# Patient Record
Sex: MALE | Race: WHITE | NOT HISPANIC OR LATINO | Employment: UNEMPLOYED | URBAN - METROPOLITAN AREA
[De-identification: names, ages, dates, MRNs, and addresses within clinical notes are randomized per-mention and may not be internally consistent; named-entity substitution may affect disease eponyms.]

---

## 2018-01-05 ENCOUNTER — APPOINTMENT (OUTPATIENT)
Dept: GENERAL RADIOLOGY | Facility: HOSPITAL | Age: 43
End: 2018-01-05

## 2018-01-05 PROCEDURE — 73610 X-RAY EXAM OF ANKLE: CPT | Performed by: GENERAL PRACTICE

## 2018-01-05 PROCEDURE — 73630 X-RAY EXAM OF FOOT: CPT | Performed by: GENERAL PRACTICE

## 2018-09-19 ENCOUNTER — OFFICE VISIT (OUTPATIENT)
Dept: FAMILY MEDICINE CLINIC | Facility: CLINIC | Age: 43
End: 2018-09-19

## 2018-09-19 VITALS
HEART RATE: 63 BPM | HEIGHT: 72 IN | DIASTOLIC BLOOD PRESSURE: 78 MMHG | WEIGHT: 315 LBS | OXYGEN SATURATION: 96 % | RESPIRATION RATE: 16 BRPM | SYSTOLIC BLOOD PRESSURE: 112 MMHG | BODY MASS INDEX: 42.66 KG/M2 | TEMPERATURE: 98.3 F

## 2018-09-19 DIAGNOSIS — I10 ESSENTIAL HYPERTENSION: ICD-10-CM

## 2018-09-19 DIAGNOSIS — E78.2 MIXED HYPERLIPIDEMIA: ICD-10-CM

## 2018-09-19 DIAGNOSIS — E55.9 VITAMIN D DEFICIENCY: ICD-10-CM

## 2018-09-19 DIAGNOSIS — G58.9 MONONEUROPATHY: ICD-10-CM

## 2018-09-19 DIAGNOSIS — E11.9 TYPE 2 DIABETES MELLITUS WITHOUT COMPLICATION, WITHOUT LONG-TERM CURRENT USE OF INSULIN (HCC): Primary | ICD-10-CM

## 2018-09-19 DIAGNOSIS — G47.33 OBSTRUCTIVE SLEEP APNEA: ICD-10-CM

## 2018-09-19 DIAGNOSIS — K21.9 GASTROESOPHAGEAL REFLUX DISEASE WITHOUT ESOPHAGITIS: ICD-10-CM

## 2018-09-19 DIAGNOSIS — Z00.00 LABORATORY EXAMINATION ORDERED AS PART OF A ROUTINE GENERAL MEDICAL EXAMINATION: ICD-10-CM

## 2018-09-19 DIAGNOSIS — F41.9 ANXIETY: ICD-10-CM

## 2018-09-19 PROCEDURE — 90715 TDAP VACCINE 7 YRS/> IM: CPT | Performed by: PHYSICIAN ASSISTANT

## 2018-09-19 PROCEDURE — 90472 IMMUNIZATION ADMIN EACH ADD: CPT | Performed by: PHYSICIAN ASSISTANT

## 2018-09-19 PROCEDURE — 99214 OFFICE O/P EST MOD 30 MIN: CPT | Performed by: PHYSICIAN ASSISTANT

## 2018-09-19 PROCEDURE — 90732 PPSV23 VACC 2 YRS+ SUBQ/IM: CPT | Performed by: PHYSICIAN ASSISTANT

## 2018-09-19 PROCEDURE — 90471 IMMUNIZATION ADMIN: CPT | Performed by: PHYSICIAN ASSISTANT

## 2018-09-19 RX ORDER — ROSUVASTATIN CALCIUM 10 MG/1
TABLET, COATED ORAL
Qty: 30 TABLET | Refills: 11 | Status: SHIPPED | OUTPATIENT
Start: 2018-09-19 | End: 2019-06-19 | Stop reason: SDUPTHER

## 2018-09-19 RX ORDER — OMEPRAZOLE 20 MG/1
20 CAPSULE, DELAYED RELEASE ORAL DAILY
Qty: 30 CAPSULE | Refills: 5 | Status: SHIPPED | OUTPATIENT
Start: 2018-09-19 | End: 2019-03-04 | Stop reason: SDUPTHER

## 2018-09-19 RX ORDER — CLOBETASOL PROPIONATE 0.5 MG/G
1 OINTMENT TOPICAL AS NEEDED
COMMUNITY
End: 2020-12-13 | Stop reason: HOSPADM

## 2018-09-19 RX ORDER — HYDROCHLOROTHIAZIDE 25 MG/1
25 TABLET ORAL DAILY
Refills: 0 | COMMUNITY
Start: 2018-06-16 | End: 2018-09-19 | Stop reason: SDUPTHER

## 2018-09-19 RX ORDER — IRBESARTAN 150 MG/1
150 TABLET ORAL DAILY
Qty: 30 TABLET | Refills: 5 | Status: SHIPPED | OUTPATIENT
Start: 2018-09-19 | End: 2019-02-17

## 2018-09-19 RX ORDER — HYDROCHLOROTHIAZIDE 25 MG/1
25 TABLET ORAL DAILY
Qty: 30 TABLET | Refills: 5 | Status: SHIPPED | OUTPATIENT
Start: 2018-09-19 | End: 2018-09-21 | Stop reason: DRUGHIGH

## 2018-09-19 RX ORDER — CITALOPRAM 10 MG/1
TABLET ORAL
Qty: 45 TABLET | Refills: 5 | Status: SHIPPED | OUTPATIENT
Start: 2018-09-19 | End: 2018-12-07 | Stop reason: SDUPTHER

## 2018-09-19 RX ORDER — IRBESARTAN 150 MG/1
150 TABLET ORAL DAILY
Refills: 1 | COMMUNITY
Start: 2018-08-28 | End: 2018-09-19 | Stop reason: SDUPTHER

## 2018-09-19 NOTE — PATIENT INSTRUCTIONS
Low glycemic index diet  Exercise 30 minutes most days of the week  Make sure you get results on any labs or tests we ordered today  We discussed medications and how to take them as prescribed  Sleep 6-8 hours each night if possible  If you have not signed up for [employer], please activate your code ASAP from your After Visit Summary today    LDL goal <100  LDL goal if heart disease <70  HDL goal >60  Triglyceride goal <150  BP goal =<130/80  Fasting glucose <100

## 2018-09-19 NOTE — PROGRESS NOTES
Subjective   Ray London is a 42 y.o. male.     History of Present Illness   Ray London 42 y.o. male who presents today for a new patient appointment.    he has a history of   Patient Active Problem List   Diagnosis   • Type 2 diabetes mellitus without complication, without long-term current use of insulin (CMS/HCC)   • GERD (gastroesophageal reflux disease)   • Essential hypertension   • Mixed hyperlipidemia   • Anxiety   • Vitamin D deficiency   • Mononeuropathy   .  he is here to establish care I reviewed the PFSH recorded today by my MA/LPN staff.   he   He has been feeling fairly well.    Sees derm  Has plantar fasciitis right     I have Lindsay labs 2-10-17 and gluce 103, creat 0.9, TSH 3.24, chol 194, , I see A1C 2015 at 10.3  A1C 2-17 was 6.9  Vit D low at 21 in 2-17  He was put on Metformin at ER 2 mos ago and did well  Has been intol to ACE    Ray London 42 y.o. male who presents today for routine follow up check and medication refills.  he has a history of   Patient Active Problem List   Diagnosis   • Type 2 diabetes mellitus without complication, without long-term current use of insulin (CMS/HCC)   • GERD (gastroesophageal reflux disease)   • Essential hypertension   • Mixed hyperlipidemia   • Anxiety   • Vitamin D deficiency   • Mononeuropathy   ., he has overall felt well.  He has GERD and is well controlled on PPI medication, Hyperlipidemia and here to discuss treatment, Vitamin D deficiency and will update labs to confirm level is at goal >30 and DMII and will restart Metformin want think about higher dose.  WAs on [employer].  he has been compliant with current medications have reviewed them.  The patient denies medication side effects.  I will start a statin  No results found for this or any previous visit.    Has prior sleep apnea    On Celexa for anxiety; this is working most of the time and will try 1.5 tabs; some episodes depression.  Mom on this  No joint pain  Weight  was 410 mos ago and working on diet and exercise  The following portions of the patient's history were reviewed and updated as appropriate: allergies, current medications, past family history, past medical history, past social history, past surgical history and problem list.    Review of Systems   Constitutional: Negative for activity change, appetite change and unexpected weight change.   HENT: Negative for nosebleeds and trouble swallowing.    Eyes: Negative for pain and visual disturbance.   Respiratory: Negative for chest tightness, shortness of breath and wheezing.    Cardiovascular: Negative for chest pain and palpitations.   Gastrointestinal: Negative for abdominal pain and blood in stool.   Endocrine: Negative.    Genitourinary: Negative for difficulty urinating and hematuria.   Musculoskeletal: Negative for joint swelling.   Skin: Positive for rash. Negative for color change.   Allergic/Immunologic: Negative.    Neurological: Negative for syncope and speech difficulty.   Hematological: Negative for adenopathy.   Psychiatric/Behavioral: Negative for agitation and confusion.   All other systems reviewed and are negative.      Objective   Physical Exam   Constitutional: He is oriented to person, place, and time. He appears well-developed and well-nourished. No distress.   HENT:   Head: Normocephalic and atraumatic.   Eyes: Pupils are equal, round, and reactive to light. Conjunctivae and EOM are normal. Right eye exhibits no discharge. Left eye exhibits no discharge. No scleral icterus.   Neck: Normal range of motion. Neck supple. No tracheal deviation present. No thyromegaly present.   Cardiovascular: Normal rate, regular rhythm, normal heart sounds, intact distal pulses and normal pulses.  Exam reveals no gallop.    No murmur heard.  Trace pedal edema = bilat  Varicose veins left lower ext   Pulmonary/Chest: Effort normal and breath sounds normal. No respiratory distress. He has no wheezes. He has no rales.    Musculoskeletal: Normal range of motion.    Ray had a diabetic foot exam performed today.   During the foot exam he had a monofilament test performed.    Neurological Sensory Findings - Unaltered hot/cold right ankle/foot discrimination and unaltered hot/cold left ankle/foot discrimination. Unaltered sharp/dull right ankle/foot discrimination and unaltered sharp/dull left ankle/foot discrimination. No right ankle/foot altered proprioception and no left ankle/foot altered proprioception  Vascular Status -  His right foot exhibits abnormal foot edema. His right foot exhibits normal foot vasculature . His left foot exhibits abnormal foot edema. His left foot exhibits normal foot vasculature .  Skin Integrity  -  His right foot skin is not intact. He has right foot onychomycosis and callous right foot.  He has no right foot ulcer, no ingrown toenail on right foot, right heel is not dry and cracked, no right foot warmth, no right foot blister and no right foot gangrenous changes.  His left foot skin is not intact.He has left foot onychomycosis and callous left foot. He has no left foot ulcer, no left ingrown toenail, no left heel dry and cracked, no left foot warmth, no left foot blister and no left foot gangrenous changes..     Neurological: He is alert and oriented to person, place, and time. He exhibits normal muscle tone. Coordination normal.   Skin: Skin is warm. Rash noted. No erythema. No pallor.   Psychiatric: He has a normal mood and affect. His behavior is normal. Judgment and thought content normal.   Nursing note and vitals reviewed.      Assessment/Plan   Ray was seen today for establish care and anemia.    Diagnoses and all orders for this visit:    Type 2 diabetes mellitus without complication, without long-term current use of insulin (CMS/[facility])  -     Comprehensive metabolic panel  -     Lipid panel  -     CBC and Differential  -     TSH  -     T3  -     T4, Free  -     Vitamin B12  -     Folate  -      Vitamin D 25 Hydroxy  -     Hemoglobin A1c  -     MicroAlbumin, Urine, Random - Urine, Clean Catch  -     Ambulatory Referral to Nutrition Services  -     Ambulatory Referral to Diabetic Education    Gastroesophageal reflux disease without esophagitis  -     Comprehensive metabolic panel  -     Lipid panel  -     CBC and Differential  -     TSH  -     T3  -     T4, Free  -     Vitamin B12  -     Folate  -     Vitamin D 25 Hydroxy  -     Hemoglobin A1c  -     MicroAlbumin, Urine, Random - Urine, Clean Catch    Essential hypertension  -     Comprehensive metabolic panel  -     Lipid panel  -     CBC and Differential  -     TSH  -     T3  -     T4, Free  -     Vitamin B12  -     Folate  -     Vitamin D 25 Hydroxy  -     Hemoglobin A1c  -     MicroAlbumin, Urine, Random - Urine, Clean Catch    Mixed hyperlipidemia  -     Comprehensive metabolic panel  -     Lipid panel  -     CBC and Differential  -     TSH  -     T3  -     T4, Free  -     Vitamin B12  -     Folate  -     Vitamin D 25 Hydroxy  -     Hemoglobin A1c  -     MicroAlbumin, Urine, Random - Urine, Clean Catch    Anxiety  -     Comprehensive metabolic panel  -     Lipid panel  -     CBC and Differential  -     TSH  -     T3  -     T4, Free  -     Vitamin B12  -     Folate  -     Vitamin D 25 Hydroxy  -     Hemoglobin A1c  -     MicroAlbumin, Urine, Random - Urine, Clean Catch    Vitamin D deficiency  -     Comprehensive metabolic panel  -     Lipid panel  -     CBC and Differential  -     TSH  -     T3  -     T4, Free  -     Vitamin B12  -     Folate  -     Vitamin D 25 Hydroxy  -     Hemoglobin A1c  -     MicroAlbumin, Urine, Random - Urine, Clean Catch    Laboratory examination ordered as part of a routine general medical examination  -     Comprehensive metabolic panel  -     Lipid panel  -     CBC and Differential  -     TSH  -     T3  -     T4, Free  -     Vitamin B12  -     Folate  -     Vitamin D 25 Hydroxy  -     Hemoglobin A1c  -     MicroAlbumin,  Urine, Random - Urine, Clean Catch    Obstructive sleep apnea    Mononeuropathy    Other orders  -     metFORMIN (GLUCOPHAGE) 500 MG tablet; One PO daily with food and MVI for DMII; titrate as high as 2 tabs PO BID  -     citalopram (CeleXA) 10 MG tablet; 1.5 (15mg) tabs PO daily for stress; (put on file)  -     rosuvastatin (CRESTOR) 10 MG tablet; Start 1/2 tab PO daily For cholesterol; try to get to 1 tab PO daily  -     Pneumococcal Polysaccharide Vaccine 23-Valent Greater Than or Equal To 1yo Subcutaneous / IM  -     Tdap Vaccine Greater Than or Equal To 8yo IM  -     omeprazole (priLOSEC) 20 MG capsule; Take 1 capsule by mouth Daily. For GERD  -     irbesartan (AVAPRO) 150 MG tablet; Take 1 tablet by mouth Daily. For BP; (put on file)  -     hydrochlorothiazide (HYDRODIURIL) 25 MG tablet; Take 1 tablet by mouth Daily. For BP;(put on file)

## 2018-09-21 LAB
25(OH)D3+25(OH)D2 SERPL-MCNC: 15.9 NG/ML (ref 30–100)
ALBUMIN SERPL-MCNC: 4.6 G/DL (ref 3.5–5.2)
ALBUMIN/GLOB SERPL: 1.5 G/DL
ALP SERPL-CCNC: 90 U/L (ref 39–117)
ALT SERPL-CCNC: 23 U/L (ref 1–41)
AST SERPL-CCNC: 10 U/L (ref 1–40)
BASOPHILS # BLD AUTO: 0.08 10*3/MM3 (ref 0–0.2)
BASOPHILS NFR BLD AUTO: 0.7 % (ref 0–1.5)
BILIRUB SERPL-MCNC: 0.5 MG/DL (ref 0.1–1.2)
BUN SERPL-MCNC: 22 MG/DL (ref 6–20)
BUN/CREAT SERPL: 19.8 (ref 7–25)
CALCIUM SERPL-MCNC: 10 MG/DL (ref 8.6–10.5)
CHLORIDE SERPL-SCNC: 96 MMOL/L (ref 98–107)
CHOLEST SERPL-MCNC: 206 MG/DL (ref 0–200)
CO2 SERPL-SCNC: 25.7 MMOL/L (ref 22–29)
CREAT SERPL-MCNC: 1.11 MG/DL (ref 0.76–1.27)
EOSINOPHIL # BLD AUTO: 0.12 10*3/MM3 (ref 0–0.7)
EOSINOPHIL NFR BLD AUTO: 1.1 % (ref 0.3–6.2)
ERYTHROCYTE [DISTWIDTH] IN BLOOD BY AUTOMATED COUNT: 13.4 % (ref 11.5–14.5)
FOLATE SERPL-MCNC: 4.04 NG/ML (ref 4.78–24.2)
GLOBULIN SER CALC-MCNC: 3 GM/DL
GLUCOSE SERPL-MCNC: 373 MG/DL (ref 65–99)
HBA1C MFR BLD: 11.2 % (ref 4.8–5.6)
HCT VFR BLD AUTO: 47.1 % (ref 40.4–52.2)
HDLC SERPL-MCNC: 39 MG/DL (ref 40–60)
HGB BLD-MCNC: 16.3 G/DL (ref 13.7–17.6)
IMM GRANULOCYTES # BLD: 0.07 10*3/MM3 (ref 0–0.03)
IMM GRANULOCYTES NFR BLD: 0.6 % (ref 0–0.5)
LDLC SERPL CALC-MCNC: 144 MG/DL (ref 0–100)
LYMPHOCYTES # BLD AUTO: 2.7 10*3/MM3 (ref 0.9–4.8)
LYMPHOCYTES NFR BLD AUTO: 24.1 % (ref 19.6–45.3)
MCH RBC QN AUTO: 29.5 PG (ref 27–32.7)
MCHC RBC AUTO-ENTMCNC: 34.6 G/DL (ref 32.6–36.4)
MCV RBC AUTO: 85.3 FL (ref 79.8–96.2)
MICROALBUMIN UR-MCNC: 73.5 UG/ML
MONOCYTES # BLD AUTO: 0.72 10*3/MM3 (ref 0.2–1.2)
MONOCYTES NFR BLD AUTO: 6.4 % (ref 5–12)
NEUTROPHILS # BLD AUTO: 7.6 10*3/MM3 (ref 1.9–8.1)
NEUTROPHILS NFR BLD AUTO: 67.7 % (ref 42.7–76)
PLATELET # BLD AUTO: 264 10*3/MM3 (ref 140–500)
POTASSIUM SERPL-SCNC: 4.9 MMOL/L (ref 3.5–5.2)
PROT SERPL-MCNC: 7.6 G/DL (ref 6–8.5)
RBC # BLD AUTO: 5.52 10*6/MM3 (ref 4.6–6)
SODIUM SERPL-SCNC: 136 MMOL/L (ref 136–145)
T3 SERPL-MCNC: 135.9 NG/DL (ref 80–200)
T4 FREE SERPL-MCNC: 1.69 NG/DL (ref 0.93–1.7)
TRIGL SERPL-MCNC: 113 MG/DL (ref 0–150)
TSH SERPL DL<=0.005 MIU/L-ACNC: 3.4 MIU/ML (ref 0.27–4.2)
VIT B12 SERPL-MCNC: 677 PG/ML (ref 211–946)
VLDLC SERPL CALC-MCNC: 22.6 MG/DL (ref 5–40)
WBC # BLD AUTO: 11.22 10*3/MM3 (ref 4.5–10.7)

## 2018-09-21 RX ORDER — FOLIC ACID 1 MG/1
1 TABLET ORAL DAILY
Qty: 90 TABLET | Refills: 1 | Status: SHIPPED | OUTPATIENT
Start: 2018-09-21 | End: 2019-05-22 | Stop reason: SDUPTHER

## 2018-09-21 RX ORDER — HYDROCHLOROTHIAZIDE 12.5 MG/1
12.5 TABLET ORAL DAILY
Qty: 90 TABLET | Refills: 1 | Status: SHIPPED | OUTPATIENT
Start: 2018-09-21 | End: 2019-05-22

## 2018-10-10 ENCOUNTER — APPOINTMENT (OUTPATIENT)
Dept: DIABETES SERVICES | Facility: HOSPITAL | Age: 43
End: 2018-10-10

## 2018-11-05 ENCOUNTER — APPOINTMENT (OUTPATIENT)
Dept: SLEEP MEDICINE | Facility: HOSPITAL | Age: 43
End: 2018-11-05
Attending: INTERNAL MEDICINE

## 2018-12-07 RX ORDER — CITALOPRAM 10 MG/1
TABLET ORAL
Qty: 60 TABLET | Refills: 5 | Status: SHIPPED | OUTPATIENT
Start: 2018-12-07 | End: 2019-06-06 | Stop reason: SDUPTHER

## 2019-02-15 ENCOUNTER — TELEPHONE (OUTPATIENT)
Dept: FAMILY MEDICINE CLINIC | Facility: CLINIC | Age: 44
End: 2019-02-15

## 2019-02-15 DIAGNOSIS — E11.9 TYPE 2 DIABETES MELLITUS WITHOUT COMPLICATION, WITHOUT LONG-TERM CURRENT USE OF INSULIN (HCC): ICD-10-CM

## 2019-02-15 DIAGNOSIS — I10 ESSENTIAL HYPERTENSION: Primary | ICD-10-CM

## 2019-02-15 DIAGNOSIS — E11.65 UNCONTROLLED TYPE 2 DIABETES MELLITUS WITH HYPERGLYCEMIA, WITHOUT LONG-TERM CURRENT USE OF INSULIN (HCC): ICD-10-CM

## 2019-02-15 DIAGNOSIS — E78.2 MIXED HYPERLIPIDEMIA: ICD-10-CM

## 2019-02-15 DIAGNOSIS — E53.8 FOLIC ACID DEFICIENCY: ICD-10-CM

## 2019-02-15 NOTE — TELEPHONE ENCOUNTER
Dr Aiken at dermatologist calling about lab work. His potassium 5.8. It is has been elevated the last couple test. Please advise do you want me to have pt come in for appointment

## 2019-02-16 RX ORDER — ADALIMUMAB 40MG/0.8ML
KIT SUBCUTANEOUS
COMMUNITY
Start: 2018-12-14 | End: 2020-07-23

## 2019-02-16 NOTE — TELEPHONE ENCOUNTER
I saw this patient Sept for first time and   Lab Results   Component Value Date    HGBA1C 11.20 (H) 09/19/2018     Lab Results   Component Value Date    BUN 22 (H) 09/19/2018    CREATININE 1.11 09/19/2018    EGFRIFNONA 73 09/19/2018    EGFRIFAFRI 88 09/19/2018    BCR 19.8 09/19/2018    K 4.9 09/19/2018    CO2 25.7 09/19/2018    CALCIUM 10.0 09/19/2018    PROTENTOTREF 7.6 09/19/2018    ALBUMIN 4.60 09/19/2018    LABIL2 1.5 09/19/2018    AST 10 09/19/2018    ALT 23 09/19/2018     Gluc 373     I did collaborate with you and started these 3 meds.  He was no show to Diabetes ed. Has not done f/u labs or see me for f/u.  Seeing derm and K+ high;  I will call him and stop ARB.  Will want to send to endocrine and not sure if I should stop Metformin and Glyxambi.  I do NOT have a copy of his labs.  I only know K+ high.

## 2019-02-17 RX ORDER — AMLODIPINE BESYLATE 5 MG/1
5 TABLET ORAL DAILY
Qty: 30 TABLET | Refills: 0 | Status: SHIPPED | OUTPATIENT
Start: 2019-02-17 | End: 2019-03-21 | Stop reason: SDUPTHER

## 2019-02-17 NOTE — TELEPHONE ENCOUNTER
Stop Irbesartan due to K+ high; sent Norvasc to replace.  I ordered labs that were due months ago along with appointment.  Must do labs in 1-2 days and see me if wants me to be his primary care APC.

## 2019-03-04 RX ORDER — OMEPRAZOLE 20 MG/1
20 CAPSULE, DELAYED RELEASE ORAL DAILY
Qty: 30 CAPSULE | Refills: 0 | Status: SHIPPED | OUTPATIENT
Start: 2019-03-04 | End: 2019-04-06 | Stop reason: SDUPTHER

## 2019-03-15 ENCOUNTER — HOSPITAL ENCOUNTER (EMERGENCY)
Facility: HOSPITAL | Age: 44
Discharge: HOME | End: 2019-03-15
Attending: EMERGENCY MEDICINE | Admitting: EMERGENCY MEDICINE

## 2019-03-15 ENCOUNTER — APPOINTMENT (OUTPATIENT)
Dept: CT IMAGING | Facility: HOSPITAL | Age: 44
End: 2019-03-15

## 2019-03-15 VITALS
BODY MASS INDEX: 42.66 KG/M2 | WEIGHT: 315 LBS | SYSTOLIC BLOOD PRESSURE: 153 MMHG | HEART RATE: 74 BPM | RESPIRATION RATE: 16 BRPM | HEIGHT: 72 IN | OXYGEN SATURATION: 95 % | TEMPERATURE: 96.5 F | DIASTOLIC BLOOD PRESSURE: 91 MMHG

## 2019-03-15 DIAGNOSIS — S06.0X1A CONCUSSION WITH LOSS OF CONSCIOUSNESS OF 30 MINUTES OR LESS, INITIAL ENCOUNTER: Primary | ICD-10-CM

## 2019-03-15 PROCEDURE — 70450 CT HEAD/BRAIN W/O DYE: CPT

## 2019-03-15 PROCEDURE — 99283 EMERGENCY DEPT VISIT LOW MDM: CPT

## 2019-03-15 RX ORDER — ACETAMINOPHEN 500 MG
1000 TABLET ORAL ONCE
Status: COMPLETED | OUTPATIENT
Start: 2019-03-15 | End: 2019-03-15

## 2019-03-15 RX ADMIN — ACETAMINOPHEN 1000 MG: 500 TABLET, FILM COATED ORAL at 10:54

## 2019-03-15 NOTE — DISCHARGE INSTRUCTIONS
Avoid strenuous activities until your headaches have completely resolved.  Take Tylenol or ibuprofen as needed for headaches.  Drink plenty of fluids.  Follow-up with your primary care doctor next week.  Return to emergency department for worsening headache, vomiting, dizziness, confusion, or other concern.

## 2019-03-21 RX ORDER — AMLODIPINE BESYLATE 5 MG/1
5 TABLET ORAL DAILY
Qty: 30 TABLET | Refills: 0 | Status: SHIPPED | OUTPATIENT
Start: 2019-03-21 | End: 2019-04-22 | Stop reason: SDUPTHER

## 2019-04-05 RX ORDER — FOLIC ACID 1 MG/1
TABLET ORAL
Qty: 90 TABLET | Refills: 1 | OUTPATIENT
Start: 2019-04-05

## 2019-04-05 RX ORDER — HYDROCHLOROTHIAZIDE 12.5 MG/1
TABLET ORAL
Qty: 90 TABLET | Refills: 1 | OUTPATIENT
Start: 2019-04-05

## 2019-04-05 NOTE — TELEPHONE ENCOUNTER
I must see him and have labs that I ordered Sept.    Lab Results   Component Value Date    HGBA1C 11.20 (H) 09/19/2018     May give 7 day Rx if make appt and get labs

## 2019-04-07 RX ORDER — OMEPRAZOLE 20 MG/1
20 CAPSULE, DELAYED RELEASE ORAL DAILY
Qty: 30 CAPSULE | Refills: 0 | Status: SHIPPED | OUTPATIENT
Start: 2019-04-07 | End: 2019-05-09 | Stop reason: SDUPTHER

## 2019-04-22 RX ORDER — AMLODIPINE BESYLATE 5 MG/1
5 TABLET ORAL DAILY
Qty: 30 TABLET | Refills: 0 | Status: SHIPPED | OUTPATIENT
Start: 2019-04-22 | End: 2019-05-21 | Stop reason: SDUPTHER

## 2019-05-09 RX ORDER — OMEPRAZOLE 20 MG/1
20 CAPSULE, DELAYED RELEASE ORAL DAILY
Qty: 30 CAPSULE | Refills: 0 | Status: SHIPPED | OUTPATIENT
Start: 2019-05-09 | End: 2019-05-22 | Stop reason: SDUPTHER

## 2019-05-21 RX ORDER — AMLODIPINE BESYLATE 5 MG/1
5 TABLET ORAL DAILY
Qty: 30 TABLET | Refills: 0 | Status: SHIPPED | OUTPATIENT
Start: 2019-05-21 | End: 2019-06-17 | Stop reason: SDUPTHER

## 2019-05-22 ENCOUNTER — OFFICE VISIT (OUTPATIENT)
Dept: FAMILY MEDICINE CLINIC | Facility: CLINIC | Age: 44
End: 2019-05-22

## 2019-05-22 VITALS
RESPIRATION RATE: 16 BRPM | HEART RATE: 68 BPM | HEIGHT: 72 IN | WEIGHT: 315 LBS | DIASTOLIC BLOOD PRESSURE: 94 MMHG | TEMPERATURE: 98.3 F | BODY MASS INDEX: 42.66 KG/M2 | OXYGEN SATURATION: 97 % | SYSTOLIC BLOOD PRESSURE: 140 MMHG

## 2019-05-22 DIAGNOSIS — E53.8 FOLIC ACID DEFICIENCY: ICD-10-CM

## 2019-05-22 DIAGNOSIS — G47.33 OBSTRUCTIVE SLEEP APNEA: ICD-10-CM

## 2019-05-22 DIAGNOSIS — M54.41 ACUTE MIDLINE LOW BACK PAIN WITH BILATERAL SCIATICA: ICD-10-CM

## 2019-05-22 DIAGNOSIS — E55.9 VITAMIN D DEFICIENCY: ICD-10-CM

## 2019-05-22 DIAGNOSIS — K21.9 GASTROESOPHAGEAL REFLUX DISEASE WITHOUT ESOPHAGITIS: ICD-10-CM

## 2019-05-22 DIAGNOSIS — M54.42 ACUTE MIDLINE LOW BACK PAIN WITH BILATERAL SCIATICA: ICD-10-CM

## 2019-05-22 DIAGNOSIS — E11.65 TYPE 2 DIABETES MELLITUS WITH HYPERGLYCEMIA, WITHOUT LONG-TERM CURRENT USE OF INSULIN (HCC): Primary | ICD-10-CM

## 2019-05-22 DIAGNOSIS — I10 ESSENTIAL HYPERTENSION: ICD-10-CM

## 2019-05-22 DIAGNOSIS — E78.2 MIXED HYPERLIPIDEMIA: ICD-10-CM

## 2019-05-22 PROBLEM — G47.30 OBSERVED SLEEP APNEA: Status: ACTIVE | Noted: 2019-05-22

## 2019-05-22 PROCEDURE — 99214 OFFICE O/P EST MOD 30 MIN: CPT | Performed by: PHYSICIAN ASSISTANT

## 2019-05-22 RX ORDER — FEXOFENADINE HYDROCHLORIDE 60 MG/1
60 TABLET, FILM COATED ORAL DAILY
COMMUNITY
Start: 2019-05-02 | End: 2019-06-19 | Stop reason: SDDI

## 2019-05-22 RX ORDER — IRBESARTAN 150 MG/1
150 TABLET ORAL
Refills: 1 | COMMUNITY
Start: 2019-03-04 | End: 2019-05-22

## 2019-05-22 RX ORDER — FLUTICASONE PROPIONATE 50 MCG
SPRAY, SUSPENSION (ML) NASAL
Refills: 0 | COMMUNITY
Start: 2019-05-02 | End: 2019-06-19 | Stop reason: SDDI

## 2019-05-22 RX ORDER — VALSARTAN AND HYDROCHLOROTHIAZIDE 160; 12.5 MG/1; MG/1
1 TABLET, FILM COATED ORAL DAILY
Qty: 30 TABLET | Refills: 1 | Status: SHIPPED | OUTPATIENT
Start: 2019-05-22 | End: 2019-06-19 | Stop reason: SDUPTHER

## 2019-05-22 RX ORDER — OMEPRAZOLE 20 MG/1
20 CAPSULE, DELAYED RELEASE ORAL DAILY
Qty: 30 CAPSULE | Refills: 0 | Status: SHIPPED | OUTPATIENT
Start: 2019-05-22 | End: 2019-06-19 | Stop reason: SDUPTHER

## 2019-05-22 RX ORDER — FOLIC ACID 1 MG/1
1 TABLET ORAL DAILY
Qty: 90 TABLET | Refills: 1 | Status: SHIPPED | OUTPATIENT
Start: 2019-05-22 | End: 2019-12-01 | Stop reason: SDUPTHER

## 2019-05-22 RX ORDER — BLOOD SUGAR DIAGNOSTIC
STRIP MISCELLANEOUS
Refills: 0 | COMMUNITY
Start: 2019-03-04 | End: 2019-06-19 | Stop reason: SDDI

## 2019-05-22 NOTE — PATIENT INSTRUCTIONS
Low glycemic index diet  Exercise 30 minutes most days of the week  Make sure you get results on any labs or tests we ordered today  We discussed medications and how to take them as prescribed  Sleep 6-8 hours each night if possible  If you have not signed up for [employer], please activate your code ASAP from your After Visit Summary today    LDL goal <100  LDL goal if heart disease <70  HDL goal >60  Triglyceride goal <150  BP goal =<130/80  Fasting glucose <100

## 2019-05-22 NOTE — PROGRESS NOTES
Subjective   Ray London is a 43 y.o. male.     History of Present Illness   Ray London 43 y.o. male who presents today for routine follow up check and medication refills.  he has a history of   Patient Active Problem List   Diagnosis   • Type 2 diabetes mellitus with hyperglycemia, without long-term current use of insulin (CMS/[facility])   • GERD (gastroesophageal reflux disease)   • Essential hypertension   • Mixed hyperlipidemia   • Anxiety   • Vitamin D deficiency   • Mononeuropathy   • Vitamin D deficiency   • Folic acid deficiency   .  Since the last visit, he has overall felt well.  He has has been having elevated blood pressure readings and here to evaluate this, DMII and is here to discuss recent abnormal labs, GERD and is well controlled on PPI medication, Hyperlipidemia and here to discuss treatment and low folic acid and on Rx.  he has been compliant with current medications have reviewed them.  The patient denies medication side effects.  He has not done any f/u with me; here for back pain;  I cannot address back pain now.  Need bp management; DMII management    Results for orders placed or performed in visit on 09/19/18   Comprehensive metabolic panel   Result Value Ref Range    Glucose 373 (H) 65 - 99 mg/dL    BUN 22 (H) 6 - 20 mg/dL    Creatinine 1.11 0.76 - 1.27 mg/dL    eGFR Non African Am 73 >60 mL/min/1.73    eGFR African Am 88 >60 mL/min/1.73    BUN/Creatinine Ratio 19.8 7.0 - 25.0    Sodium 136 136 - 145 mmol/L    Potassium 4.9 3.5 - 5.2 mmol/L    Chloride 96 (L) 98 - 107 mmol/L    Total CO2 25.7 22.0 - 29.0 mmol/L    Calcium 10.0 8.6 - 10.5 mg/dL    Total Protein 7.6 6.0 - 8.5 g/dL    Albumin 4.60 3.50 - 5.20 g/dL    Globulin 3.0 gm/dL    A/G Ratio 1.5 g/dL    Total Bilirubin 0.5 0.1 - 1.2 mg/dL    Alkaline Phosphatase 90 39 - 117 U/L    AST (SGOT) 10 1 - 40 U/L    ALT (SGPT) 23 1 - 41 U/L   Lipid panel   Result Value Ref Range    Total Cholesterol 206 (H) 0 - 200 mg/dL    Triglycerides  113 0 - 150 mg/dL    HDL Cholesterol 39 (L) 40 - 60 mg/dL    VLDL Cholesterol 22.6 5 - 40 mg/dL    LDL Cholesterol  144 (H) 0 - 100 mg/dL   TSH   Result Value Ref Range    TSH 3.400 0.270 - 4.200 mIU/mL   T3   Result Value Ref Range    T3, Total 135.9 80.0 - 200.0 ng/dl   T4, Free   Result Value Ref Range    Free T4 1.69 0.93 - 1.70 ng/dL   Vitamin B12   Result Value Ref Range    Vitamin B-12 677 211 - 946 pg/mL   Folate   Result Value Ref Range    Folate 4.04 (L) 4.78 - 24.20 ng/mL   Vitamin D 25 Hydroxy   Result Value Ref Range    25 Hydroxy, Vitamin D 15.9 (L) 30.0 - 100.0 ng/ml   Hemoglobin A1c   Result Value Ref Range    Hemoglobin A1C 11.20 (H) 4.80 - 5.60 %   MicroAlbumin, Urine, Random - Urine, Clean Catch   Result Value Ref Range    Microalbumin, Urine 73.5 Not Estab. ug/mL   CBC and Differential   Result Value Ref Range    WBC 11.22 (H) 4.50 - 10.70 10*3/mm3    RBC 5.52 4.60 - 6.00 10*6/mm3    Hemoglobin 16.3 13.7 - 17.6 g/dL    Hematocrit 47.1 40.4 - 52.2 %    MCV 85.3 79.8 - 96.2 fL    MCH 29.5 27.0 - 32.7 pg    MCHC 34.6 32.6 - 36.4 g/dL    RDW 13.4 11.5 - 14.5 %    Platelets 264 140 - 500 10*3/mm3    Neutrophil Rel % 67.7 42.7 - 76.0 %    Lymphocyte Rel % 24.1 19.6 - 45.3 %    Monocyte Rel % 6.4 5.0 - 12.0 %    Eosinophil Rel % 1.1 0.3 - 6.2 %    Basophil Rel % 0.7 0.0 - 1.5 %    Neutrophils Absolute 7.60 1.90 - 8.10 10*3/mm3    Lymphocytes Absolute 2.70 0.90 - 4.80 10*3/mm3    Monocytes Absolute 0.72 0.20 - 1.20 10*3/mm3    Eosinophils Absolute 0.12 0.00 - 0.70 10*3/mm3    Basophils Absolute 0.08 0.00 - 0.20 10*3/mm3    Immature Granulocyte Rel % 0.6 (H) 0.0 - 0.5 %    Immature Grans Absolute 0.07 (H) 0.00 - 0.03 10*3/mm3       Did not f/u with me for DMII and HTN;  Glyxambi---cannot take with Humalejandro Bell Lita 43 y.o. male presents for evaluation and treatment of  low back pain. The patient first noted symptoms several months ago. It was not related to none recalled by the patient.  The pain  intensity is moderate , and is located right side , central, mid buttock and right leg. The pain is described as aching, throbbing and variable intensity and occurs intermittently. The symptoms are progressive. Symptoms are exacerbated by sitting; . Factors which relieve the pain include nothing helps. Other associated symptoms include denies motor loss, denies sensory loss, denies N/T in legs and denies loss of muscle tone. Previous history of symptoms: never. Treatment efforts have included nothing helps , without relief.  Again, I do not have time to address this today and did anyway;  I will Xray lumbar spine and consult PT; needs f/u for this; no oral pred d/t   Lab Results   Component Value Date    HGBA1C 11.20 (H) 09/19/2018         The following portions of the patient's history were reviewed and updated as appropriate: allergies, current medications, past family history, past medical history, past social history, past surgical history and problem list.    Review of Systems   Constitutional: Negative for activity change, appetite change and unexpected weight change.   HENT: Negative for nosebleeds and trouble swallowing.    Eyes: Negative for pain and visual disturbance.   Respiratory: Negative for chest tightness, shortness of breath and wheezing.    Cardiovascular: Positive for leg swelling. Negative for chest pain and palpitations.   Gastrointestinal: Negative for abdominal pain and blood in stool.   Endocrine: Negative.    Genitourinary: Negative for difficulty urinating and hematuria.   Musculoskeletal: Positive for back pain. Negative for joint swelling.   Skin: Positive for rash. Negative for color change.   Allergic/Immunologic: Negative.    Neurological: Negative for syncope and speech difficulty.   Hematological: Negative for adenopathy.   Psychiatric/Behavioral: Negative for agitation and confusion.   All other systems reviewed and are negative.      Objective   Physical Exam   Constitutional: He  is oriented to person, place, and time. He appears well-developed and well-nourished. No distress.   HENT:   Head: Normocephalic and atraumatic.   Eyes: Conjunctivae and EOM are normal. Pupils are equal, round, and reactive to light. Right eye exhibits no discharge. Left eye exhibits no discharge. No scleral icterus.   Neck: Normal range of motion. Neck supple. No tracheal deviation present. No thyromegaly present.   Cardiovascular: Normal rate, regular rhythm, normal heart sounds, intact distal pulses and normal pulses. Exam reveals no gallop.   No murmur heard.  Pedal edema   Pulmonary/Chest: Effort normal and breath sounds normal. No respiratory distress. He has no wheezes. He has no rales.   Musculoskeletal: Normal range of motion. He exhibits no tenderness.   Neurological: He is alert and oriented to person, place, and time. He displays normal reflexes. He exhibits normal muscle tone. Coordination normal.   Skin: Skin is warm. Rash noted. No erythema. No pallor.   Psychiatric: He has a normal mood and affect. His behavior is normal. Judgment and thought content normal.   Nursing note and vitals reviewed.      Assessment/Plan   Ray was seen today for back pain.    Diagnoses and all orders for this visit:    Type 2 diabetes mellitus with hyperglycemia, without long-term current use of insulin (CMS/[facility])    Mixed hyperlipidemia    Acute midline low back pain with bilateral sciatica  -     XR Spine Lumbar 4+ View  -     Ambulatory Referral to Physical Therapy    Other orders  -     Empagliflozin-Linagliptin (GLYXAMBI) 10-5 MG tablet; Take 1 tablet by mouth Daily AND 1 tablet Daily. Do all this for 30 days. One PO daily for DMII with Metformin  -     valsartan-hydrochlorothiazide (DIOVAN HCT) 160-12.5 MG per tablet; Take 1 tablet by mouth Daily.  -     folic acid (FOLVITE) 1 MG tablet; Take 1 tablet by mouth Daily.    can do only so many problems in one visit  Xray--cannot do here d/t weight  PT back pain  In  summary, Ray Lita, was seen today.  he was seen for  has been having elevated blood pressure readings and here to evaluate this, DMII and is here to discuss recent abnormal labs, Hyperlipidemia and here to discuss treatment, Vitamin D deficiency and will update labs to confirm level is at goal >30 and Folic acid def and Rx,      Pt concerned about missing work for f/u and will give note today

## 2019-06-05 ENCOUNTER — TREATMENT (OUTPATIENT)
Dept: PHYSICAL THERAPY | Facility: CLINIC | Age: 44
End: 2019-06-05

## 2019-06-05 DIAGNOSIS — M54.41 ACUTE BILATERAL LOW BACK PAIN WITH BILATERAL SCIATICA: Primary | ICD-10-CM

## 2019-06-05 DIAGNOSIS — M54.42 ACUTE BILATERAL LOW BACK PAIN WITH BILATERAL SCIATICA: Primary | ICD-10-CM

## 2019-06-05 DIAGNOSIS — M51.9 LUMBAR DISC DISORDER: ICD-10-CM

## 2019-06-05 PROCEDURE — 97161 PT EVAL LOW COMPLEX 20 MIN: CPT | Performed by: PHYSICAL THERAPIST

## 2019-06-05 NOTE — PATIENT INSTRUCTIONS
Pt educated to ice often and to lie prone on pillow up to 5 min at time and for 3-4 x /day until next visit met.

## 2019-06-05 NOTE — PROGRESS NOTES
Physical Therapy Initial Evaluation and Plan of Care    Patient: Ray London   : 1975  Diagnosis/ICD-10 Code:  Acute bilateral low back pain with bilateral sciatica [M54.42, M54.41]  Referring practitioner: Claudette Lux PA-C  Past medical Hx reviewed: 2019     Subjective Evaluation    History of Present Illness  Onset date: 3 months   Mechanism of injury: I have been having back pain over the last 3 months.  I don't have a definative reason why the back started hurting but I did have plantar fasciitis that lasted for a few months that might have caused me to walk differently.  That's the only thing that I can think of.  Currently, no problems with the plantar fascia.  Currently, the back pain seems to switch sides and radiates in the back of the thighs and right above the back of the knees.  Initially, it was only the R side, then spread to the other leg.  I have noticed that I had more pain with lifting then it progressed to pain with sitting then eventually walking. I do have trouble with transitions to standing as well.  Cough and sneeze can also cause the back to feel like I've been punched in my back.        Patient Occupation: [employer] employee.  (full-time) prolonged standing, walking.  Rarely sitting.   Precautions and Work Restrictions: No work restrictions currently.  Quality of life: good    Pain  Current pain ratin  At best pain ratin  At worst pain ratin  Location: (B) lower back (R/L)   Quality: sharp, dull ache, tight and pulling  Relieving factors: heat, rest, change in position and medications (Naproxen: Some help )  Aggravating factors: ambulation, lifting, prolonged positioning and repetitive movement  Progression: worsening    Social Support  Lives in: apartment (2nd level)  Lives with: spouse    Diagnostic Tests  No diagnostic tests performed  Abnormal x-ray: Order placed but not completed yet.      Treatments  Previous treatment: medication  Patient Goals  Patient  goals for therapy: decreased pain, increased motion, return to sport/leisure activities, independence with ADLs/IADLs and increased strength        Objective       Static Posture     Lumbar Spine   Flattened.   Lumbar spine (Left): Shifted.     Comments  Significant lumbar shift noted in standing:  R pelvis, L shoulders.  Noted forward trunk lean in standing.  R hip out flare.      Active Range of Motion     Lumbar   Flexion: 80 degrees   Extension: 20 degrees   Left lateral flexion: 23 degrees   Right lateral flexion: 25 degrees   Left rotation: 38 degrees   Right rotation: 55 degrees     Tests     Lumbar     Left   Positive quadrant (pain to R gluteal region).   Negative vertical compression.     Right   Positive passive SLR and quadrant (pain to R gluteal region ).   Negative vertical compression.     Additional Tests Details  Seated lumbar stability testing: (-) but some slight discomfort reported after test.    Seated R slump test R.          Functional Assessment     Comments  TU.35 sec.  No AD.  Antalgic pattern with poor spinal alignment.    Sit to stand:  30.96 sec.  Significant froward trunk lean to stand.    Stairs:  Reports step to pattern with L LE leading most often.      Assessment & Plan     Assessment  Impairments: abnormal or restricted ROM, activity intolerance, impaired physical strength, lacks appropriate home exercise program and pain with function  Assessment details: Pt presents to PT with symptoms consistent with lumbar disc derangement (possible bulge) from R lumbar.  He exhibits significant lumbo pelvic shift and decreased lumbar lordosis.  Pt would benefit from skilled PT intervention to address the deficits noted.  We were unable to provide TE this date due to extensive evaluation but will provide HEP next visit.      Barriers to therapy: Obesity, duration of symptoms,   Prognosis: fair  Prognosis details: Will treat for 3 weeks to determine if any progress is being made.  If  negligible improvements, will refer to MD for consultation and imaging.    Functional Limitations: carrying objects, lifting, sleeping, walking, uncomfortable because of pain, moving in bed, sitting and unable to perform repetitive tasks  Goals  Plan Goals: SHORT TERM GOALS: Time for Goal Achievement: 3 weeks    1.  Patient to be compliant and progression of HEP                             2.  Pain level < 6/10 at worst with mentioned activities to improve function  3.  Increased thoracic, lumbar and SIJ mobility to allow for increased lumbar posture with mild shift noted   4.  Increased lumbar AROM Rot L by 10 deg, extension by 5-10 deg. to allow for increased ease with sit-stand transfers and functional activities    LONG TERM GOALS: Time for Goal Achievement: 6-8 weeks  1.  Pt. to score < 15 % on Back Index  2.  Pain level < 2/10 with all listed activities to return to normal.  3.  Lumbar AROM to WFL to allow for return to household & recreational activities w/o increased symptoms  4.  (B) LE and lower abdominal strength to 5/5 to allow for pushing, pulling and activities to occur without pain (driving, sitting, household  & Job requirements)  5.  Pt will exhibit negative findings for special tests (quadrant, Slump, standing shift correction)   6.  Pt will exhibit improved TUG time by >8 sec., 5 x sit to stand < 15x and traverse stairs alternating pattern to demonstrate improved function.          Plan  Therapy options: will be seen for skilled physical therapy services  Planned modality interventions: cryotherapy, electrical stimulation/Russian stimulation, TENS and thermotherapy (hydrocollator packs)  Other planned modality interventions: Dry Needling  Planned therapy interventions: body mechanics training, flexibility, functional ROM exercises, home exercise program, manual therapy, neuromuscular re-education, postural training, spinal/joint mobilization, stretching, strengthening and soft tissue  mobilization  Frequency: 2x week  Duration in weeks: 8  Treatment plan discussed with: patient    Manual Therapy:    -     mins  22449;  Therapeutic Exercise:    -     mins  31917;     Neuromuscular Gladys:    -    mins  70784;    Therapeutic Activity:     -     mins  84763;     Gait Training:      -     mins  81208;     Ultrasound:     -     mins  32140;    Electrical Stimulation:    -     mins  39104 ( );  Dry Needling     -     mins self-pay    Timed Treatment:   -   mins   Total Treatment:     55   mins    PT SIGNATURE:  Michael Salcedo, Student PT    I have reviewed and approved all documentation provided in this note.  All treatment has been provided under the direct supervision of physical therapist.       Andrew Villegas PT   [place] License #: [id number]    DATE TREATMENT INITIATED: 6/5/2019    Initial Certification  Certification Period: 9/3/2019  I certify that the therapy services are furnished while this patient is under my care.  The services outlined above are required by this patient, and will be reviewed every 90 days.     PHYSICIAN: Claudette Lux PA-C      DATE:     Please sign and return via fax to [phone].. Thank you, [facility].

## 2019-06-06 RX ORDER — CITALOPRAM 10 MG/1
TABLET ORAL
Qty: 60 TABLET | Refills: 0 | Status: SHIPPED | OUTPATIENT
Start: 2019-06-06 | End: 2019-06-19

## 2019-06-07 ENCOUNTER — TREATMENT (OUTPATIENT)
Dept: PHYSICAL THERAPY | Facility: CLINIC | Age: 44
End: 2019-06-07

## 2019-06-07 DIAGNOSIS — M51.9 LUMBAR DISC DISORDER: ICD-10-CM

## 2019-06-07 DIAGNOSIS — M54.42 ACUTE BILATERAL LOW BACK PAIN WITH BILATERAL SCIATICA: Primary | ICD-10-CM

## 2019-06-07 DIAGNOSIS — M54.41 ACUTE BILATERAL LOW BACK PAIN WITH BILATERAL SCIATICA: Primary | ICD-10-CM

## 2019-06-07 PROCEDURE — 97112 NEUROMUSCULAR REEDUCATION: CPT | Performed by: PHYSICAL THERAPIST

## 2019-06-07 PROCEDURE — 97110 THERAPEUTIC EXERCISES: CPT | Performed by: PHYSICAL THERAPIST

## 2019-06-07 PROCEDURE — 97014 ELECTRIC STIMULATION THERAPY: CPT | Performed by: PHYSICAL THERAPIST

## 2019-06-07 NOTE — PROGRESS NOTES
Physical Therapy Daily Progress Note  Visits:2    Subjective : Ray London reports: I worked today so I'm experiencing more pain than I was the first day I came to PT. My manager seems to at least be more understanding of my back pain now.  I did get my chart online to look at the evaluation and could show my employer.      Objective:   See Exercise, Manual, and Modality Logs for complete treatment.     Assessment/Plan:Today's visit focus was placed on initiating TE and providing activity that he could perform without increased symptoms and to begin to normalize movement patterns.  He still exhibits quite a bit of lumbosacral shift and limited mobility in gait.  He could not tolerated lying on table, therefore all TE performed in seated position this date.  We did apply TENS, ice and K-taping following.  He reported feeling better after completion of session.      Progress per Plan of Care and Progress strengthening /stabilization /functional activity       Manual Therapy:    -     mins  84289;  Therapeutic Exercise:    35     mins  34829;     Neuromuscular Gladys:    5    mins  50033;    Therapeutic Activity:     -     mins  45238;     Gait Training:      -     mins  56362;     Ultrasound:     -     mins  20321;    Electrical Stimulation:    15     mins  40697 ( );  Dry Needling     -     mins self-pay    Timed Treatment:   40   mins   Total Treatment:     55   mins    ANETTE Heath License #[id number]    Physical Therapist

## 2019-06-10 ENCOUNTER — HOSPITAL ENCOUNTER (EMERGENCY)
Facility: HOSPITAL | Age: 44
Discharge: HOME | End: 2019-06-11
Attending: EMERGENCY MEDICINE | Admitting: EMERGENCY MEDICINE

## 2019-06-10 ENCOUNTER — APPOINTMENT (OUTPATIENT)
Dept: GENERAL RADIOLOGY | Facility: HOSPITAL | Age: 44
End: 2019-06-10

## 2019-06-10 DIAGNOSIS — F41.0 PANIC ATTACK: Primary | ICD-10-CM

## 2019-06-10 DIAGNOSIS — N28.9 RENAL INSUFFICIENCY: ICD-10-CM

## 2019-06-10 LAB
BASOPHILS # BLD AUTO: 0.1 10*3/MM3 (ref 0–0.2)
BASOPHILS NFR BLD AUTO: 0.8 % (ref 0–1.5)
DEPRECATED RDW RBC AUTO: 38.1 FL (ref 37–54)
EOSINOPHIL # BLD AUTO: 0.22 10*3/MM3 (ref 0–0.4)
EOSINOPHIL NFR BLD AUTO: 1.9 % (ref 0.3–6.2)
ERYTHROCYTE [DISTWIDTH] IN BLOOD BY AUTOMATED COUNT: 12.5 % (ref 12.3–15.4)
HCT VFR BLD AUTO: 45.7 % (ref 37.5–51)
HGB BLD-MCNC: 15.7 G/DL (ref 13–17.7)
IMM GRANULOCYTES # BLD AUTO: 0.07 10*3/MM3 (ref 0–0.05)
IMM GRANULOCYTES NFR BLD AUTO: 0.6 % (ref 0–0.5)
LYMPHOCYTES # BLD AUTO: 5.05 10*3/MM3 (ref 0.7–3.1)
LYMPHOCYTES NFR BLD AUTO: 42.9 % (ref 19.6–45.3)
MCH RBC QN AUTO: 28.9 PG (ref 26.6–33)
MCHC RBC AUTO-ENTMCNC: 34.4 G/DL (ref 31.5–35.7)
MCV RBC AUTO: 84 FL (ref 79–97)
MONOCYTES # BLD AUTO: 0.77 10*3/MM3 (ref 0.1–0.9)
MONOCYTES NFR BLD AUTO: 6.5 % (ref 5–12)
NEUTROPHILS # BLD AUTO: 5.56 10*3/MM3 (ref 1.7–7)
NEUTROPHILS NFR BLD AUTO: 47.3 % (ref 42.7–76)
NRBC BLD AUTO-RTO: 0 /100 WBC (ref 0–0.2)
PLATELET # BLD AUTO: 254 10*3/MM3 (ref 140–450)
PMV BLD AUTO: 10.9 FL (ref 6–12)
RBC # BLD AUTO: 5.44 10*6/MM3 (ref 4.14–5.8)
WBC NRBC COR # BLD: 11.77 10*3/MM3 (ref 3.4–10.8)

## 2019-06-10 PROCEDURE — 93005 ELECTROCARDIOGRAM TRACING: CPT | Performed by: EMERGENCY MEDICINE

## 2019-06-10 PROCEDURE — 84484 ASSAY OF TROPONIN QUANT: CPT | Performed by: PHYSICIAN ASSISTANT

## 2019-06-10 PROCEDURE — 93005 ELECTROCARDIOGRAM TRACING: CPT

## 2019-06-10 PROCEDURE — 85025 COMPLETE CBC W/AUTO DIFF WBC: CPT | Performed by: PHYSICIAN ASSISTANT

## 2019-06-10 PROCEDURE — 80053 COMPREHEN METABOLIC PANEL: CPT | Performed by: PHYSICIAN ASSISTANT

## 2019-06-10 PROCEDURE — 71045 X-RAY EXAM CHEST 1 VIEW: CPT

## 2019-06-10 PROCEDURE — 99284 EMERGENCY DEPT VISIT MOD MDM: CPT

## 2019-06-10 PROCEDURE — 93010 ELECTROCARDIOGRAM REPORT: CPT | Performed by: INTERNAL MEDICINE

## 2019-06-11 ENCOUNTER — TREATMENT (OUTPATIENT)
Dept: PHYSICAL THERAPY | Facility: CLINIC | Age: 44
End: 2019-06-11

## 2019-06-11 VITALS
HEIGHT: 74 IN | RESPIRATION RATE: 18 BRPM | HEART RATE: 68 BPM | SYSTOLIC BLOOD PRESSURE: 119 MMHG | BODY MASS INDEX: 40.43 KG/M2 | WEIGHT: 315 LBS | OXYGEN SATURATION: 96 % | DIASTOLIC BLOOD PRESSURE: 82 MMHG | TEMPERATURE: 97 F

## 2019-06-11 DIAGNOSIS — M51.9 LUMBAR DISC DISORDER: ICD-10-CM

## 2019-06-11 DIAGNOSIS — E11.65 TYPE 2 DIABETES MELLITUS WITH HYPERGLYCEMIA, WITHOUT LONG-TERM CURRENT USE OF INSULIN (HCC): Primary | ICD-10-CM

## 2019-06-11 DIAGNOSIS — M54.42 ACUTE BILATERAL LOW BACK PAIN WITH BILATERAL SCIATICA: Primary | ICD-10-CM

## 2019-06-11 DIAGNOSIS — M54.41 ACUTE BILATERAL LOW BACK PAIN WITH BILATERAL SCIATICA: Primary | ICD-10-CM

## 2019-06-11 LAB
ALBUMIN SERPL-MCNC: 4.6 G/DL (ref 3.5–5.2)
ALBUMIN/GLOB SERPL: 1.4 G/DL
ALP SERPL-CCNC: 53 U/L (ref 39–117)
ALT SERPL W P-5'-P-CCNC: 34 U/L (ref 1–41)
ANION GAP SERPL CALCULATED.3IONS-SCNC: 16.9 MMOL/L
AST SERPL-CCNC: 21 U/L (ref 1–40)
BILIRUB SERPL-MCNC: 0.5 MG/DL (ref 0.2–1.2)
BUN BLD-MCNC: 17 MG/DL (ref 6–20)
BUN/CREAT SERPL: 10.7 (ref 7–25)
CALCIUM SPEC-SCNC: 10 MG/DL (ref 8.6–10.5)
CHLORIDE SERPL-SCNC: 95 MMOL/L (ref 98–107)
CO2 SERPL-SCNC: 26.1 MMOL/L (ref 22–29)
CREAT BLD-MCNC: 1.59 MG/DL (ref 0.76–1.27)
GFR SERPL CREATININE-BSD FRML MDRD: 48 ML/MIN/1.73
GLOBULIN UR ELPH-MCNC: 3.2 GM/DL
GLUCOSE BLD-MCNC: 210 MG/DL (ref 65–99)
POTASSIUM BLD-SCNC: 3.9 MMOL/L (ref 3.5–5.2)
PROT SERPL-MCNC: 7.8 G/DL (ref 6–8.5)
SODIUM BLD-SCNC: 138 MMOL/L (ref 136–145)
TROPONIN T SERPL-MCNC: <0.01 NG/ML (ref 0–0.03)

## 2019-06-11 PROCEDURE — 97140 MANUAL THERAPY 1/> REGIONS: CPT | Performed by: PHYSICAL THERAPIST

## 2019-06-11 PROCEDURE — 97014 ELECTRIC STIMULATION THERAPY: CPT | Performed by: PHYSICAL THERAPIST

## 2019-06-11 PROCEDURE — 97110 THERAPEUTIC EXERCISES: CPT | Performed by: PHYSICAL THERAPIST

## 2019-06-11 NOTE — PROGRESS NOTES
Physical Therapy Daily Progress Note  Visits:3    Subjective : Ray London reports: I am feeling better today than I have been.  I went through a work day without as much pain.  I am trying to center my self in standing and use my core more when I move.      Objective: Less notable lumbar shift in standing but still present.   See Exercise, Manual, and Modality Logs for complete treatment.     Assessment/Plan:Pt tolerated TE progression well and we have been able to perform more TE with less discomfort.     Progress per Plan of Care and Progress strengthening /stabilization /functional activity     Manual Therapy:    8     mins  83936;  Therapeutic Exercise:    36     mins  37204;     Neuromuscular Gladys:    -    mins  45096;    Therapeutic Activity:     -     mins  73564;     Gait Training:      -     mins  94432;     Ultrasound:     -     mins  61495;    Electrical Stimulation:    15     mins  33774 ( );  Dry Needling     -     mins self-pay    Timed Treatment:   44   mins   Total Treatment:     55   mins    ANETTE Heath License #[id number]    Physical Therapist

## 2019-06-12 LAB
25(OH)D3+25(OH)D2 SERPL-MCNC: 26.5 NG/ML (ref 30–100)
ALBUMIN SERPL-MCNC: 4.7 G/DL (ref 3.5–5.2)
ALBUMIN/GLOB SERPL: 1.5 G/DL
ALP SERPL-CCNC: 59 U/L (ref 39–117)
ALT SERPL-CCNC: 39 U/L (ref 1–41)
AST SERPL-CCNC: 28 U/L (ref 1–40)
BASOPHILS # BLD AUTO: 0.13 10*3/MM3 (ref 0–0.2)
BASOPHILS NFR BLD AUTO: 1.3 % (ref 0–1.5)
BILIRUB SERPL-MCNC: 0.7 MG/DL (ref 0.2–1.2)
BUN SERPL-MCNC: 17 MG/DL (ref 6–20)
BUN/CREAT SERPL: 13.9 (ref 7–25)
CALCIUM SERPL-MCNC: 10.5 MG/DL (ref 8.6–10.5)
CHLORIDE SERPL-SCNC: 95 MMOL/L (ref 98–107)
CHOLEST SERPL-MCNC: 132 MG/DL (ref 0–200)
CO2 SERPL-SCNC: 29.1 MMOL/L (ref 22–29)
CREAT SERPL-MCNC: 1.22 MG/DL (ref 0.76–1.27)
EOSINOPHIL # BLD AUTO: 0.27 10*3/MM3 (ref 0–0.4)
EOSINOPHIL NFR BLD AUTO: 2.7 % (ref 0.3–6.2)
ERYTHROCYTE [DISTWIDTH] IN BLOOD BY AUTOMATED COUNT: 13.1 % (ref 12.3–15.4)
FOLATE SERPL-MCNC: >20 NG/ML (ref 4.78–24.2)
GLOBULIN SER CALC-MCNC: 3.1 GM/DL
GLUCOSE SERPL-MCNC: 273 MG/DL (ref 65–99)
HBA1C MFR BLD: 9.8 % (ref 4.8–5.6)
HCT VFR BLD AUTO: 47.9 % (ref 37.5–51)
HDLC SERPL-MCNC: 37 MG/DL (ref 40–60)
HGB BLD-MCNC: 16.1 G/DL (ref 13–17.7)
IMM GRANULOCYTES # BLD AUTO: 0.04 10*3/MM3 (ref 0–0.05)
IMM GRANULOCYTES NFR BLD AUTO: 0.4 % (ref 0–0.5)
LDLC SERPL CALC-MCNC: 71 MG/DL (ref 0–100)
LYMPHOCYTES # BLD AUTO: 3.3 10*3/MM3 (ref 0.7–3.1)
LYMPHOCYTES NFR BLD AUTO: 33.5 % (ref 19.6–45.3)
MCH RBC QN AUTO: 29.2 PG (ref 26.6–33)
MCHC RBC AUTO-ENTMCNC: 33.6 G/DL (ref 31.5–35.7)
MCV RBC AUTO: 86.8 FL (ref 79–97)
MICROALBUMIN UR-MCNC: 14 UG/ML
MONOCYTES # BLD AUTO: 0.81 10*3/MM3 (ref 0.1–0.9)
MONOCYTES NFR BLD AUTO: 8.2 % (ref 5–12)
NEUTROPHILS # BLD AUTO: 5.31 10*3/MM3 (ref 1.7–7)
NEUTROPHILS NFR BLD AUTO: 53.9 % (ref 42.7–76)
NRBC BLD AUTO-RTO: 0 /100 WBC (ref 0–0.2)
PLATELET # BLD AUTO: 249 10*3/MM3 (ref 140–450)
POTASSIUM SERPL-SCNC: 5.2 MMOL/L (ref 3.5–5.2)
PROT SERPL-MCNC: 7.8 G/DL (ref 6–8.5)
RBC # BLD AUTO: 5.52 10*6/MM3 (ref 4.14–5.8)
SODIUM SERPL-SCNC: 135 MMOL/L (ref 136–145)
T3FREE SERPL-MCNC: 3.7 PG/ML (ref 2–4.4)
T4 FREE SERPL-MCNC: 1.46 NG/DL (ref 0.93–1.7)
TRIGL SERPL-MCNC: 121 MG/DL (ref 0–150)
TSH SERPL DL<=0.005 MIU/L-ACNC: 3.42 MIU/ML (ref 0.27–4.2)
VIT B12 SERPL-MCNC: 730 PG/ML (ref 211–946)
VLDLC SERPL CALC-MCNC: 24.2 MG/DL
WBC # BLD AUTO: 9.86 10*3/MM3 (ref 3.4–10.8)

## 2019-06-12 RX ORDER — METFORMIN HYDROCHLORIDE 500 MG/1
TABLET, EXTENDED RELEASE ORAL
Qty: 120 TABLET | Refills: 2 | Status: SHIPPED | OUTPATIENT
Start: 2019-06-12 | End: 2019-06-19 | Stop reason: SDUPTHER

## 2019-06-13 ENCOUNTER — TREATMENT (OUTPATIENT)
Dept: PHYSICAL THERAPY | Facility: CLINIC | Age: 44
End: 2019-06-13

## 2019-06-13 ENCOUNTER — TELEPHONE (OUTPATIENT)
Dept: FAMILY MEDICINE CLINIC | Facility: CLINIC | Age: 44
End: 2019-06-13

## 2019-06-13 DIAGNOSIS — M54.42 ACUTE BILATERAL LOW BACK PAIN WITH BILATERAL SCIATICA: Primary | ICD-10-CM

## 2019-06-13 DIAGNOSIS — M51.9 LUMBAR DISC DISORDER: ICD-10-CM

## 2019-06-13 DIAGNOSIS — M54.41 ACUTE BILATERAL LOW BACK PAIN WITH BILATERAL SCIATICA: Primary | ICD-10-CM

## 2019-06-13 PROCEDURE — 97110 THERAPEUTIC EXERCISES: CPT | Performed by: PHYSICAL THERAPIST

## 2019-06-13 PROCEDURE — 97140 MANUAL THERAPY 1/> REGIONS: CPT | Performed by: PHYSICAL THERAPIST

## 2019-06-13 PROCEDURE — 97014 ELECTRIC STIMULATION THERAPY: CPT | Performed by: PHYSICAL THERAPIST

## 2019-06-13 NOTE — PROGRESS NOTES
Physical Therapy Daily Progress Note  Visits:4    Subjective : Ray London reports: Last night I woke up about 4 am with back pain and back spasms.  I couldn't stand up straight.  It took about an hour to get back to sleep.  I couldn't really walk more than about 10 feet before the pain mehul pretty significant.  I can't think of anything that may have caused it to happen.  I felt fine yesterday before going to sleep.    When I woke up, I could feel pain down the R leg into the knee on that side.      Objective: -  See Exercise, Manual, and Modality Logs for complete treatment.     Assessment/Plan:Pt was educated to make follow up appointment with referring physician for referral to spine specialist due to severity of symptoms.  He was making steady improvements with the back and his symptoms but due to the ease of irritation from sleeping, suspect that his disc dysfunction may require further medical intervention.  We would like to continue treating until he can be seen by doctor.  Today's visit focused on pain control with modalities and manual intervention.      Progress per Plan of Care, Referral to MD     Manual Therapy:    15     mins  10833;  Therapeutic Exercise:    8     mins  95317;     Neuromuscular Gladys:    -    mins  00753;    Therapeutic Activity:     -     mins  44878;     Gait Training:      -     mins  53054;     Ultrasound:     -     mins  10839;    Electrical Stimulation:    15     mins  29157 ( );  Dry Needling     -     mins self-pay    Timed Treatment:   23   mins   Total Treatment:     38   mins      ANETTE Heath License #[id number]    Physical Therapist

## 2019-06-13 NOTE — TELEPHONE ENCOUNTER
Pt returned call - left message to leave him a detailed message when calling him back as he is not able to answer.  Called pt and left detailed message re: lab result notes from Claudette MOTTA

## 2019-06-13 NOTE — TELEPHONE ENCOUNTER
----- Message from Claudette Lux PA-C sent at 6/12/2019  1:13 PM EDT -----  He did f/u labs next day after ER and creatinine fine; fine to stay on Metformin;  Labs show low Vitamin D levels.  I want you to add OTC Vitamin D 2,000 I.U. Daily.  LDL fine; stay on statin due to DMII  A1C better, goal not met <7; still suggest seeing endocrine---I am willing to cont. His care if he does follow up with me ---appt soon to check bp; will need labs again 3 mos

## 2019-06-17 ENCOUNTER — TREATMENT (OUTPATIENT)
Dept: PHYSICAL THERAPY | Facility: CLINIC | Age: 44
End: 2019-06-17

## 2019-06-17 DIAGNOSIS — M54.42 ACUTE BILATERAL LOW BACK PAIN WITH BILATERAL SCIATICA: ICD-10-CM

## 2019-06-17 DIAGNOSIS — M54.41 ACUTE BILATERAL LOW BACK PAIN WITH BILATERAL SCIATICA: ICD-10-CM

## 2019-06-17 DIAGNOSIS — M51.9 LUMBAR DISC DISORDER: Primary | ICD-10-CM

## 2019-06-17 PROCEDURE — 97140 MANUAL THERAPY 1/> REGIONS: CPT | Performed by: PHYSICAL THERAPIST

## 2019-06-17 PROCEDURE — 97110 THERAPEUTIC EXERCISES: CPT | Performed by: PHYSICAL THERAPIST

## 2019-06-17 RX ORDER — AMLODIPINE BESYLATE 5 MG/1
5 TABLET ORAL DAILY
Qty: 30 TABLET | Refills: 0 | Status: SHIPPED | OUTPATIENT
Start: 2019-06-17 | End: 2019-06-19 | Stop reason: SDUPTHER

## 2019-06-17 NOTE — PROGRESS NOTES
Physical Therapy Daily Progress Note  Visits:5    Subjective : Ray London reports: I did call my doctors office last week after my appointment.  I had to leave a message and I have not heard back from them.  I am starting to feel better as of about 3 days ago.  I do feel like I pulled something in the upper R back by the shoulder blade as well.      Objective: -  See Exercise, Manual, and Modality Logs for complete treatment.     Assessment/Plan: Pt tolerated treatment fair but we continue to try to address his  Limitations without causing increased pain.  He responded well to table exercises in hooklying and sidelying.    Progress per Plan of Care and Progress strengthening /stabilization /functional activity     Manual Therapy:    10     mins  58815;  Therapeutic Exercise:    40     mins  72881;     Neuromuscular Gladys:    -    mins  15145;    Therapeutic Activity:     -     mins  62994;     Gait Training:      -     mins  53300;     Ultrasound:     -     mins  65470;    Electrical Stimulation:    -     mins  44744 ( );  Dry Needling     -     mins self-pay    Timed Treatment:   50   mins   Total Treatment:     60   mins      ANETTE Heath License #[id number]    Physical Therapist

## 2019-06-19 ENCOUNTER — APPOINTMENT (OUTPATIENT)
Dept: SLEEP MEDICINE | Facility: HOSPITAL | Age: 44
End: 2019-06-19

## 2019-06-19 ENCOUNTER — OFFICE VISIT (OUTPATIENT)
Dept: FAMILY MEDICINE CLINIC | Facility: CLINIC | Age: 44
End: 2019-06-19

## 2019-06-19 VITALS
DIASTOLIC BLOOD PRESSURE: 74 MMHG | HEIGHT: 74 IN | RESPIRATION RATE: 16 BRPM | TEMPERATURE: 98.4 F | HEART RATE: 67 BPM | BODY MASS INDEX: 40.43 KG/M2 | OXYGEN SATURATION: 96 % | WEIGHT: 315 LBS | SYSTOLIC BLOOD PRESSURE: 110 MMHG

## 2019-06-19 DIAGNOSIS — G89.29 CHRONIC BILATERAL LOW BACK PAIN, WITH SCIATICA PRESENCE UNSPECIFIED: ICD-10-CM

## 2019-06-19 DIAGNOSIS — E11.65 TYPE 2 DIABETES MELLITUS WITH HYPERGLYCEMIA, WITHOUT LONG-TERM CURRENT USE OF INSULIN (HCC): Primary | ICD-10-CM

## 2019-06-19 DIAGNOSIS — I10 ESSENTIAL HYPERTENSION: ICD-10-CM

## 2019-06-19 DIAGNOSIS — F41.9 ANXIETY: ICD-10-CM

## 2019-06-19 DIAGNOSIS — K21.9 GASTROESOPHAGEAL REFLUX DISEASE WITHOUT ESOPHAGITIS: ICD-10-CM

## 2019-06-19 DIAGNOSIS — E78.2 MIXED HYPERLIPIDEMIA: ICD-10-CM

## 2019-06-19 DIAGNOSIS — E53.8 FOLIC ACID DEFICIENCY: ICD-10-CM

## 2019-06-19 DIAGNOSIS — E55.9 VITAMIN D DEFICIENCY: ICD-10-CM

## 2019-06-19 DIAGNOSIS — M54.5 CHRONIC BILATERAL LOW BACK PAIN, WITH SCIATICA PRESENCE UNSPECIFIED: ICD-10-CM

## 2019-06-19 PROBLEM — M54.50 CHRONIC BILATERAL LOW BACK PAIN: Status: ACTIVE | Noted: 2019-06-19

## 2019-06-19 PROCEDURE — 99214 OFFICE O/P EST MOD 30 MIN: CPT | Performed by: PHYSICIAN ASSISTANT

## 2019-06-19 RX ORDER — AMLODIPINE BESYLATE 5 MG/1
5 TABLET ORAL DAILY
Qty: 30 TABLET | Refills: 5 | Status: SHIPPED | OUTPATIENT
Start: 2019-06-19 | End: 2019-12-11 | Stop reason: SDUPTHER

## 2019-06-19 RX ORDER — ROSUVASTATIN CALCIUM 10 MG/1
TABLET, COATED ORAL
Qty: 30 TABLET | Refills: 11 | Status: SHIPPED | OUTPATIENT
Start: 2019-06-19 | End: 2020-07-23 | Stop reason: SDUPTHER

## 2019-06-19 RX ORDER — ESCITALOPRAM OXALATE 20 MG/1
TABLET ORAL
Qty: 30 TABLET | Refills: 5 | Status: SHIPPED | OUTPATIENT
Start: 2019-06-19 | End: 2019-12-06 | Stop reason: SDUPTHER

## 2019-06-19 RX ORDER — VALSARTAN AND HYDROCHLOROTHIAZIDE 160; 12.5 MG/1; MG/1
1 TABLET, FILM COATED ORAL DAILY
Qty: 30 TABLET | Refills: 5 | Status: SHIPPED | OUTPATIENT
Start: 2019-06-19 | End: 2019-12-11 | Stop reason: SDUPTHER

## 2019-06-19 RX ORDER — OMEPRAZOLE 20 MG/1
20 CAPSULE, DELAYED RELEASE ORAL DAILY
Qty: 30 CAPSULE | Refills: 11 | Status: SHIPPED | OUTPATIENT
Start: 2019-06-19 | End: 2020-07-01

## 2019-06-19 RX ORDER — METFORMIN HYDROCHLORIDE 500 MG/1
TABLET, EXTENDED RELEASE ORAL
Qty: 120 TABLET | Refills: 5 | Status: SHIPPED | OUTPATIENT
Start: 2019-06-19 | End: 2020-05-11

## 2019-06-19 NOTE — PROGRESS NOTES
Subjective   Ray London is a 43 y.o. male.     History of Present Illness   Ray London 43 y.o. male who presents today for routine follow up check and medication refills.  he has a history of   Patient Active Problem List   Diagnosis   • Type 2 diabetes mellitus with hyperglycemia, without long-term current use of insulin (CMS/[facility])   • GERD (gastroesophageal reflux disease)   • Essential hypertension   • Mixed hyperlipidemia   • Anxiety   • Vitamin D deficiency   • Mononeuropathy   • Vitamin D deficiency   • Folic acid deficiency   • Observed sleep apnea   • Chronic bilateral low back pain   .  Since the last visit, he has overall felt fairly well.  He has Hypertenision and is well controlled on medication, DMII and is here to discuss recent abnormal labs, GERD and is well controlled on PPI medication, Hyperlipidemia and is well controlled on medication and Vitamin D deficiency and will update labs to confirm level is at goal >30.  he has been compliant with current medications have reviewed them.  The patient denies medication side effects.  Will stay on folic acid---now at goal.  He will still get work up for sleep apnea  Results for orders placed or performed during the hospital encounter of 06/10/19   Comprehensive Metabolic Panel   Result Value Ref Range    Glucose 210 (H) 65 - 99 mg/dL    BUN 17 6 - 20 mg/dL    Creatinine 1.59 (H) 0.76 - 1.27 mg/dL    Sodium 138 136 - 145 mmol/L    Potassium 3.9 3.5 - 5.2 mmol/L    Chloride 95 (L) 98 - 107 mmol/L    CO2 26.1 22.0 - 29.0 mmol/L    Calcium 10.0 8.6 - 10.5 mg/dL    Total Protein 7.8 6.0 - 8.5 g/dL    Albumin 4.60 3.50 - 5.20 g/dL    ALT (SGPT) 34 1 - 41 U/L    AST (SGOT) 21 1 - 40 U/L    Alkaline Phosphatase 53 39 - 117 U/L    Total Bilirubin 0.5 0.2 - 1.2 mg/dL    eGFR Non African Amer 48 (L) >60 mL/min/1.73    Globulin 3.2 gm/dL    A/G Ratio 1.4 g/dL    BUN/Creatinine Ratio 10.7 7.0 - 25.0    Anion Gap 16.9 mmol/L   Troponin   Result Value Ref Range     Troponin T <0.010 0.000 - 0.030 ng/mL   CBC Auto Differential   Result Value Ref Range    WBC 11.77 (H) 3.40 - 10.80 10*3/mm3    RBC 5.44 4.14 - 5.80 10*6/mm3    Hemoglobin 15.7 13.0 - 17.7 g/dL    Hematocrit 45.7 37.5 - 51.0 %    MCV 84.0 79.0 - 97.0 fL    MCH 28.9 26.6 - 33.0 pg    MCHC 34.4 31.5 - 35.7 g/dL    RDW 12.5 12.3 - 15.4 %    RDW-SD 38.1 37.0 - 54.0 fl    MPV 10.9 6.0 - 12.0 fL    Platelets 254 140 - 450 10*3/mm3    Neutrophil % 47.3 42.7 - 76.0 %    Lymphocyte % 42.9 19.6 - 45.3 %    Monocyte % 6.5 5.0 - 12.0 %    Eosinophil % 1.9 0.3 - 6.2 %    Basophil % 0.8 0.0 - 1.5 %    Immature Grans % 0.6 (H) 0.0 - 0.5 %    Neutrophils, Absolute 5.56 1.70 - 7.00 10*3/mm3    Lymphocytes, Absolute 5.05 (H) 0.70 - 3.10 10*3/mm3    Monocytes, Absolute 0.77 0.10 - 0.90 10*3/mm3    Eosinophils, Absolute 0.22 0.00 - 0.40 10*3/mm3    Basophils, Absolute 0.10 0.00 - 0.20 10*3/mm3    Immature Grans, Absolute 0.07 (H) 0.00 - 0.05 10*3/mm3    nRBC 0.0 0.0 - 0.2 /100 WBC     Sees derm    Will wait on endocrine referral; now taking all meds and tolerates  Labs 3mos  Ray Lita male 43 y.o. who presents today for follow up mainly anxiety and has had depression.  He reports just going to ER for panic attack.  STill having anxiety and want to increase dose, but will change to Lexapro d/t concern drug interaction. Onset of symptoms was approximately several years ago.  He denies current suicidal and homicidal ideation. Risk factors are family history of anxiety and or depression and lifestyle of multiple roles.  Previous treatment includes current Rx.  He complains of the following medication side effects: none.  The patient has previously been in counseling..    PT has helped low back pain!!!!!    The following portions of the patient's history were reviewed and updated as appropriate: allergies, current medications, past family history, past medical history, past social history, past surgical history and problem  list.    Review of Systems   Constitutional: Negative for activity change, appetite change and unexpected weight change.   HENT: Negative for nosebleeds and trouble swallowing.    Eyes: Negative for pain and visual disturbance.   Respiratory: Negative for chest tightness, shortness of breath and wheezing.    Cardiovascular: Negative for chest pain and palpitations.   Gastrointestinal: Negative for abdominal pain and blood in stool.   Endocrine: Negative.    Genitourinary: Negative for difficulty urinating and hematuria.   Musculoskeletal: Negative for joint swelling.   Skin: Negative for color change and rash.   Allergic/Immunologic: Negative.    Neurological: Negative for syncope and speech difficulty.   Hematological: Negative for adenopathy.   Psychiatric/Behavioral: Negative for agitation and confusion.   All other systems reviewed and are negative.      Objective   Physical Exam   Constitutional: He is oriented to person, place, and time. He appears well-developed and well-nourished. No distress.   HENT:   Head: Normocephalic and atraumatic.   Eyes: Conjunctivae and EOM are normal. Pupils are equal, round, and reactive to light. Right eye exhibits no discharge. Left eye exhibits no discharge. No scleral icterus.   Neck: Normal range of motion. Neck supple. No tracheal deviation present. No thyromegaly present.   Cardiovascular: Normal rate, regular rhythm, normal heart sounds, intact distal pulses and normal pulses. Exam reveals no gallop.   No murmur heard.  1+= pedal edema     Pulmonary/Chest: Effort normal and breath sounds normal. No respiratory distress. He has no wheezes. He has no rales.   Musculoskeletal: Normal range of motion.   Neurological: He is alert and oriented to person, place, and time. He exhibits normal muscle tone. Coordination normal.   Skin: Skin is warm. Rash noted. No erythema. No pallor.   Psoriasis; brawny tib/fib right---start diabetic socks   Psychiatric: He has a normal mood and  affect. His behavior is normal. Judgment and thought content normal.   Nursing note and vitals reviewed.      Assessment/Plan   Problems Addressed this Visit        Cardiovascular and Mediastinum    Essential hypertension    Relevant Medications    amLODIPine (NORVASC) 5 MG tablet    valsartan-hydrochlorothiazide (DIOVAN HCT) 160-12.5 MG per tablet    Other Relevant Orders    Comprehensive Metabolic Panel    Hemoglobin A1c    Vitamin D 25 Hydroxy    Mixed hyperlipidemia    Relevant Medications    rosuvastatin (CRESTOR) 10 MG tablet    Other Relevant Orders    Comprehensive Metabolic Panel    Hemoglobin A1c    Vitamin D 25 Hydroxy       Digestive    GERD (gastroesophageal reflux disease)    Relevant Medications    omeprazole (priLOSEC) 20 MG capsule    Other Relevant Orders    Comprehensive Metabolic Panel    Hemoglobin A1c    Vitamin D 25 Hydroxy    Vitamin D deficiency    Relevant Orders    Comprehensive Metabolic Panel    Hemoglobin A1c    Vitamin D 25 Hydroxy    Folic acid deficiency    Relevant Orders    Comprehensive Metabolic Panel    Hemoglobin A1c    Vitamin D 25 Hydroxy       Endocrine    Type 2 diabetes mellitus with hyperglycemia, without long-term current use of insulin (CMS/[facility]) - Primary    Relevant Medications    metFORMIN ER (GLUCOPHAGE-XR) 500 MG 24 hr tablet    Other Relevant Orders    Comprehensive Metabolic Panel    Hemoglobin A1c    Vitamin D 25 Hydroxy       Nervous and Auditory    Chronic bilateral low back pain    Relevant Orders    Comprehensive Metabolic Panel    Hemoglobin A1c    Vitamin D 25 Hydroxy       Other    Anxiety    Relevant Orders    Comprehensive Metabolic Panel    Hemoglobin A1c    Vitamin D 25 Hydroxy          Will change to Lexapro and work up to 20mg for anxiety  In summary, Ray Alvarezfahadleydi, was seen today.  he was seen for  Hypertenision and is well controlled on medication, DMII and is here to discuss recent abnormal labs, GERD and is well controlled on PPI medication,  Hyperlipidemia and is well controlled on medication and Vitamin D deficiency and will update labs to confirm level is at goal >30,    Wait on endocrine  Watching renal functions  Cont PT for low back pain

## 2019-06-19 NOTE — PATIENT INSTRUCTIONS
Low glycemic index diet  Exercise 30 minutes most days of the week  Make sure you get results on any labs or tests we ordered today  We discussed medications and how to take them as prescribed  Sleep 6-8 hours each night if possible  If you have not signed up for [employer], please activate your code ASAP from your After Visit Summary today    LDL goal <100  LDL goal if heart disease <70  HDL goal >60  Triglyceride goal <150  BP goal =<130/80  Fasting glucose <100

## 2019-06-20 ENCOUNTER — TREATMENT (OUTPATIENT)
Dept: PHYSICAL THERAPY | Facility: CLINIC | Age: 44
End: 2019-06-20

## 2019-06-20 DIAGNOSIS — M54.41 ACUTE BILATERAL LOW BACK PAIN WITH BILATERAL SCIATICA: ICD-10-CM

## 2019-06-20 DIAGNOSIS — M51.9 LUMBAR DISC DISORDER: Primary | ICD-10-CM

## 2019-06-20 DIAGNOSIS — M54.42 ACUTE BILATERAL LOW BACK PAIN WITH BILATERAL SCIATICA: ICD-10-CM

## 2019-06-20 PROCEDURE — 97140 MANUAL THERAPY 1/> REGIONS: CPT | Performed by: PHYSICAL THERAPIST

## 2019-06-20 PROCEDURE — 97110 THERAPEUTIC EXERCISES: CPT | Performed by: PHYSICAL THERAPIST

## 2019-06-20 NOTE — PROGRESS NOTES
Physical Therapy Daily Progress Note  Visits:6    Subjective : Ray London reports: Doing better today than I have been.      Objective: -  See Exercise, Manual, and Modality Logs for complete treatment.     Assessment/Plan:Pt was able to resume some table exercises and was able to return to T-band activity in standing.    Progress per Plan of Care and Progress strengthening /stabilization /functional activity     Manual Therapy:    10     mins  06205;  Therapeutic Exercise:    40     mins  36141;     Neuromuscular Gladys:    -    mins  49936;    Therapeutic Activity:     -     mins  21365;     Gait Training:      -     mins  41247;     Ultrasound:     -     mins  96861;    Electrical Stimulation:    -     mins  25943 ( );  Dry Needling     -     mins self-pay    Timed Treatment:   50   mins   Total Treatment:     50   mins       ANETTE Heath License #[id number]    Physical Therapist

## 2019-06-24 ENCOUNTER — TREATMENT (OUTPATIENT)
Dept: PHYSICAL THERAPY | Facility: CLINIC | Age: 44
End: 2019-06-24

## 2019-06-24 DIAGNOSIS — M54.41 ACUTE BILATERAL LOW BACK PAIN WITH BILATERAL SCIATICA: ICD-10-CM

## 2019-06-24 DIAGNOSIS — M54.42 ACUTE BILATERAL LOW BACK PAIN WITH BILATERAL SCIATICA: ICD-10-CM

## 2019-06-24 DIAGNOSIS — M51.9 LUMBAR DISC DISORDER: Primary | ICD-10-CM

## 2019-06-24 PROCEDURE — 97140 MANUAL THERAPY 1/> REGIONS: CPT | Performed by: PHYSICAL THERAPIST

## 2019-06-24 PROCEDURE — 97110 THERAPEUTIC EXERCISES: CPT | Performed by: PHYSICAL THERAPIST

## 2019-06-24 NOTE — PROGRESS NOTES
Physical Therapy Daily Progress Note  Visits:7    Subjective : Ray London reports: The back is doing okay.  No flare ups this weekend, just tight in the mid back on the R side.      Objective: -  See Exercise, Manual, and Modality Logs for complete treatment.     Assessment/Plan:Pt was able to perform table based exercises without issue and we were able to integrate his standing activity without increased pain.  He seems to be turning the corner on his recovery but we continue to work on stability training.      Progress per Plan of Care and Progress strengthening /stabilization /functional activity     Manual Therapy:    12     mins  24987;  Therapeutic Exercise:    38     mins  00779;     Neuromuscular Gladys:    6    mins  17305;    Therapeutic Activity:     -     mins  81207;     Gait Training:      -     mins  08703;     Ultrasound:     -     mins  99987;    Electrical Stimulation:    -     mins  78255 ( );  Dry Needling     -     mins self-pay    Timed Treatment:   56   mins   Total Treatment:     56   mins      Andrew Villegas PT  [place] License #[id number]    Physical Therapist

## 2019-06-27 ENCOUNTER — TREATMENT (OUTPATIENT)
Dept: PHYSICAL THERAPY | Facility: CLINIC | Age: 44
End: 2019-06-27

## 2019-06-27 DIAGNOSIS — M51.9 LUMBAR DISC DISORDER: Primary | ICD-10-CM

## 2019-06-27 DIAGNOSIS — M54.42 ACUTE BILATERAL LOW BACK PAIN WITH BILATERAL SCIATICA: ICD-10-CM

## 2019-06-27 DIAGNOSIS — M54.41 ACUTE BILATERAL LOW BACK PAIN WITH BILATERAL SCIATICA: ICD-10-CM

## 2019-06-27 PROCEDURE — 97110 THERAPEUTIC EXERCISES: CPT | Performed by: PHYSICAL THERAPIST

## 2019-06-27 PROCEDURE — 97140 MANUAL THERAPY 1/> REGIONS: CPT | Performed by: PHYSICAL THERAPIST

## 2019-06-27 NOTE — PROGRESS NOTES
Physical Therapy Daily Progress Note  Visits:8    Subjective : Ray London reports: I'm doing a little better overall. I have some stiffness in the back today but not really much pain.  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  I know that can tend to tighten up my back.      Objective:   See Exercise, Manual, and Modality Logs for complete treatment.     Assessment/Plan:Pt continues to make progress and we were able to get through his WB'ing activity at the beginning of the session vs the end.      Progress per Plan of Care and Progress strengthening /stabilization /functional activity     Manual Therapy:    12     mins  30615;  Therapeutic Exercise:    38     mins  41530;     Neuromuscular Gladys:    4    mins  54485;    Therapeutic Activity:     -     mins  07573;     Gait Training:      -     mins  76543;     Ultrasound:     -     mins  49537;    Electrical Stimulation:    -     mins  35873 ( );  Dry Needling     -     mins self-pay    Timed Treatment:   54   mins   Total Treatment:     64   mins      Andrew Villegas PT  [place] License #[id number]    Physical Therapist

## 2019-07-06 RX ORDER — CITALOPRAM 10 MG/1
TABLET ORAL
Qty: 60 TABLET | Refills: 0 | OUTPATIENT
Start: 2019-07-06

## 2019-07-18 ENCOUNTER — TREATMENT (OUTPATIENT)
Dept: PHYSICAL THERAPY | Facility: CLINIC | Age: 44
End: 2019-07-18

## 2019-07-18 DIAGNOSIS — M51.9 LUMBAR DISC DISORDER: Primary | ICD-10-CM

## 2019-07-18 DIAGNOSIS — M54.41 ACUTE BILATERAL LOW BACK PAIN WITH BILATERAL SCIATICA: ICD-10-CM

## 2019-07-18 DIAGNOSIS — M54.42 ACUTE BILATERAL LOW BACK PAIN WITH BILATERAL SCIATICA: ICD-10-CM

## 2019-07-18 PROCEDURE — 97112 NEUROMUSCULAR REEDUCATION: CPT | Performed by: PHYSICAL THERAPIST

## 2019-07-18 PROCEDURE — 97035 APP MDLTY 1+ULTRASOUND EA 15: CPT | Performed by: PHYSICAL THERAPIST

## 2019-07-18 PROCEDURE — 97110 THERAPEUTIC EXERCISES: CPT | Performed by: PHYSICAL THERAPIST

## 2019-07-18 NOTE — PROGRESS NOTES
Physical Therapy Daily Progress Note  Visits:9    Subjective : Ray London reports: It's been almost 3 weeks since I was last in PT and I am holding up pretty good.  I haven't missed any work and it seems like my back pain has reduced to a smaller area on the R low back and upper buttock.      Objective: -  See Exercise, Manual, and Modality Logs for complete treatment.     Assessment/Plan:Pt did well with PT intervention and due to more specific pain site, we did apply US modality in which he replied was very helpful.  We will be focusing of strength and endurance for activity since he indicates that specific tasks don't really bother him, it more how much activity he does.      Progress per Plan of Care and Progress strengthening /stabilization /functional activity         Manual Therapy:    -     mins  81265;  Therapeutic Exercise:    40     mins  71616;     Neuromuscular Gladys:    10    mins  84092;    Therapeutic Activity:     -     mins  14729;     Gait Training:      -     mins  44925;     Ultrasound:     8     mins  16125;    Electrical Stimulation:    -     mins  98599 ( );  Dry Needling     -     mins self-pay    Timed Treatment:   58   mins   Total Treatment:     68   mins      Andrew Villegas PT  [place] License #[id number]    Physical Therapist

## 2019-07-24 ENCOUNTER — TREATMENT (OUTPATIENT)
Dept: PHYSICAL THERAPY | Facility: CLINIC | Age: 44
End: 2019-07-24

## 2019-07-24 DIAGNOSIS — M54.42 ACUTE BILATERAL LOW BACK PAIN WITH BILATERAL SCIATICA: ICD-10-CM

## 2019-07-24 DIAGNOSIS — M51.9 LUMBAR DISC DISORDER: Primary | ICD-10-CM

## 2019-07-24 DIAGNOSIS — M54.41 ACUTE BILATERAL LOW BACK PAIN WITH BILATERAL SCIATICA: ICD-10-CM

## 2019-07-24 PROCEDURE — 97530 THERAPEUTIC ACTIVITIES: CPT | Performed by: PHYSICAL THERAPIST

## 2019-07-24 PROCEDURE — 97140 MANUAL THERAPY 1/> REGIONS: CPT | Performed by: PHYSICAL THERAPIST

## 2019-07-24 PROCEDURE — 97110 THERAPEUTIC EXERCISES: CPT | Performed by: PHYSICAL THERAPIST

## 2019-07-25 NOTE — PROGRESS NOTES
Physical Therapy Daily Progress Note  Visits:10    Subjective : Ray London reports: I continue to have trouble with standing still at work for upwards of an hour.  Not really sure what I can do about that.  (We suggested anti-fatigue mats, better shoes, weight shift techniques, staggered stance and leaning on desk to support the trunk).    Objective: -  See Exercise, Manual, and Modality Logs for complete treatment.     Assessment/Plan:Pt was provided tennis ball and educated on TPR for the lumbar spine and gluteals.  He responded well to treatment.  He continues to lack some core and trunk strength but improving.  I believe the nature of his occupation continues to be an irritant but not much expected to change with work environment but we did discuss.     Progress per Plan of Care and Progress strengthening /stabilization /functional activity     Manual Therapy:    15     mins  52296;  Therapeutic Exercise:    25     mins  96765;     Neuromuscular Gladys:    -    mins  00649;    Therapeutic Activity:     14     mins  16500;     Gait Training:      -     mins  59183;     Ultrasound:     --     mins  88284;    Electrical Stimulation:    -     mins  90710 ( );  Dry Needling     -     mins self-pay    Timed Treatment:   54   mins   Total Treatment:     54   mins      ANETTE Heath License #[id number]    Physical Therapist

## 2019-08-02 ENCOUNTER — TREATMENT (OUTPATIENT)
Dept: PHYSICAL THERAPY | Facility: CLINIC | Age: 44
End: 2019-08-02

## 2019-08-02 DIAGNOSIS — M54.41 ACUTE BILATERAL LOW BACK PAIN WITH BILATERAL SCIATICA: ICD-10-CM

## 2019-08-02 DIAGNOSIS — M54.42 ACUTE BILATERAL LOW BACK PAIN WITH BILATERAL SCIATICA: ICD-10-CM

## 2019-08-02 DIAGNOSIS — M51.9 LUMBAR DISC DISORDER: Primary | ICD-10-CM

## 2019-08-02 PROCEDURE — 97112 NEUROMUSCULAR REEDUCATION: CPT | Performed by: PHYSICAL THERAPIST

## 2019-08-02 PROCEDURE — 97110 THERAPEUTIC EXERCISES: CPT | Performed by: PHYSICAL THERAPIST

## 2019-08-02 NOTE — PROGRESS NOTES
Physical Therapy Daily Progress Note      Patient: Ray London   : 1975  Diagnosis/ICD-10 Code:  Lumbar disc disorder [M51.9]  Referring practitioner: Claudette Lux PA-C  Date of Initial Visit: Type: THERAPY  Noted: 2019  Today's Date: 2019  Patient seen for 11 sessions    Subjective : Ray London reports: I feel pretty good today.  Just a little tight but not bad.      Objective: -  See Exercise, Manual, and Modality Logs for complete treatment.     Assessment/Plan:Pt tolerated treatment very well overall and we were able to work through all of his TE with less rest breaks. His standing endurance is improving.      Progress per Plan of Care and Progress strengthening /stabilization /functional activity     Manual Therapy:    -     mins  22450;  Therapeutic Exercise:    48     mins  16362;     Neuromuscular Gladys:    8    mins  51470;    Therapeutic Activity:     -     mins  96946;     Gait Training:      -     mins  74357;     Ultrasound:     -     mins  24730;    Electrical Stimulation:    -     mins  58962 ( );  Dry Needling     -     mins self-pay    Timed Treatment:   56   mins   Total Treatment:     69   mins      I have reviewed and approved all documentation provided in this note.  All treatment has been provided under the direct supervision of physical therapist.      Andrew Villegas PT  [place] License #[id number]    Physical Therapist

## 2019-08-07 ENCOUNTER — TREATMENT (OUTPATIENT)
Dept: PHYSICAL THERAPY | Facility: CLINIC | Age: 44
End: 2019-08-07

## 2019-08-07 DIAGNOSIS — M54.42 ACUTE BILATERAL LOW BACK PAIN WITH BILATERAL SCIATICA: ICD-10-CM

## 2019-08-07 DIAGNOSIS — M51.9 LUMBAR DISC DISORDER: Primary | ICD-10-CM

## 2019-08-07 DIAGNOSIS — M54.41 ACUTE BILATERAL LOW BACK PAIN WITH BILATERAL SCIATICA: ICD-10-CM

## 2019-08-07 PROCEDURE — 97530 THERAPEUTIC ACTIVITIES: CPT | Performed by: PHYSICAL THERAPIST

## 2019-08-07 PROCEDURE — 97110 THERAPEUTIC EXERCISES: CPT | Performed by: PHYSICAL THERAPIST

## 2019-08-07 PROCEDURE — 97140 MANUAL THERAPY 1/> REGIONS: CPT | Performed by: PHYSICAL THERAPIST

## 2019-08-08 NOTE — PROGRESS NOTES
Physical Therapy Daily Progress Note      Patient: Ray London   : 1975  Diagnosis/ICD-10 Code:  Lumbar disc disorder [M51.9]  Referring practitioner: Claudette Lux PA-C  Date of Initial Visit: Type: THERAPY  Noted: 2019  Today's Date: 2019  Patient seen for 12 sessions    Subjective : Ray London reports: I'm feeling pretty good today overall.  Therapy has really helped me.  When I first started, I thought there was no hope for my back.  I do feel stronger and I have learned a lot.      Objective: Observation.  Able to stand erect from sagittal plane but does still present with slight shift in frontal plane with R shifted pelvis and relative L shifted shoulders.    See Exercise, Manual, and Modality Logs for complete treatment.     Assessment/Plan:Today's session focused on maintaining erect postures in standing while performing dynamic and static tasks.  He has less lumbar strain with optimized postures.      Progress per Plan of Care and Anticipate DC next Visit     Manual Therapy:    12     mins  55733;  Therapeutic Exercise:    25     mins  15061;     Neuromuscular Gladys:    5    mins  28319;    Therapeutic Activity:     10     mins  00201;     Gait Training:      -     mins  21432;     Ultrasound:     -     mins  20862;    Electrical Stimulation:    -     mins  28910 ( );  Dry Needling     -     mins self-pay    Timed Treatment:   52   mins   Total Treatment:     68   mins      ANETTE Heath License #[id number]    Physical Therapist

## 2019-08-09 ENCOUNTER — TREATMENT (OUTPATIENT)
Dept: PHYSICAL THERAPY | Facility: CLINIC | Age: 44
End: 2019-08-09

## 2019-08-09 DIAGNOSIS — M54.42 ACUTE BILATERAL LOW BACK PAIN WITH BILATERAL SCIATICA: ICD-10-CM

## 2019-08-09 DIAGNOSIS — M54.41 ACUTE BILATERAL LOW BACK PAIN WITH BILATERAL SCIATICA: ICD-10-CM

## 2019-08-09 DIAGNOSIS — M51.9 LUMBAR DISC DISORDER: Primary | ICD-10-CM

## 2019-08-09 PROCEDURE — 97110 THERAPEUTIC EXERCISES: CPT | Performed by: PHYSICAL THERAPIST

## 2019-08-09 PROCEDURE — 97530 THERAPEUTIC ACTIVITIES: CPT | Performed by: PHYSICAL THERAPIST

## 2019-08-09 PROCEDURE — 97140 MANUAL THERAPY 1/> REGIONS: CPT | Performed by: PHYSICAL THERAPIST

## 2019-08-09 NOTE — PROGRESS NOTES
Physical Therapy Discharge Note      Patient: Ray London   : 1975  Diagnosis/ICD-10 Code:  Lumbar disc disorder [M51.9]  Referring practitioner: Claudette Lux PA-C  Date of Initial Visit: Type: THERAPY  Noted: 2019  Today's Date: 2019  Patient seen for 13 sessions    Subjective : Ray London reports: I was doing pretty good overall but then last night I had some discomfort and difficulty straightening up.  It was a new pain in the upper back between the spine and the rib cage.  It felt a little sharp especially when I turn some.        Objective: Palpation: R T 8-9, Costovertebral joints:  TTP: 1+, mild hypomobility noted.  R SHoulder flexion: 5/5, Scaption: 5/5 (mild discomfort) Serratus Ant :  4+/5.     Lumbar Spine   Flattened.   Lumbar spine (Left): Shifted.      Comments:   Significant lumbar shift noted in standing:  R pelvis, L shoulders.  Noted forward trunk lean in standing.  R hip out flare.       Active Range of Motion      Lumbar   Flexion: 85 deg. At eval: 80 degrees   Extension: 25 deg. At eval: 20 degrees   Left lateral flexion: 30 deg. At eval: 23 degrees   Right lateral flexion: 30 deg. At eval:  25 degrees   Left rotation: 50 deg.  At eval: 38 degrees   Right rotation: 50 deg.  At eval: 55 degrees      Tests    Lumbar      Left:   Positive quadrant (pain to R gluteal region).   Negative vertical compression.      Right   Positive passive SLR and quadrant (pain to R gluteal region ).   Negative vertical compression.     Additional Tests Details  Seated lumbar stability testing: (-) but some slight discomfort reported after test.    Seated R slump test R.        Functional Assessment      Comments  TUG: Current:  12.90 , 8.96 sec.  Unassisted At eval: 18.35 sec.  No AD.  Antalgic pattern with poor spinal alignment.    5 x sit to stand:  Current: 19.65 with UE.  At eval: 30.96 sec.  Significant froward trunk lean to stand.    Sit to stand transfer:  No difficulty with No UE  assist.  Good form and posture.    Stairs:  Reports step to pattern with L LE leading most often.    See Exercise, Manual, and Modality Logs for complete treatment.     Assessment/Plan:Pt tolerated treatment well overall and we were able to reduce the acute R thoracic pain that he was experiencing over the last day or so.  I believe the optimized posturing of the thoracic and lumbar spine caused an acute thoracic facet and costovertebral joint compression.  We performed manual intervention and provided TE to add to HEP.  He will be discharged to [facility] and provided instructions on return to PT as needed.     D/C to HEP  Other     Manual Therapy:    10     mins  84583;  Therapeutic Exercise:    30     mins  55964;     Neuromuscular Gladys:    -    mins  88189;    Therapeutic Activity:     15     mins  59881;   Including tests and measures   Gait Training:      -     mins  19244;     Ultrasound:     -     mins  70396;    Electrical Stimulation:    -     mins  22214 ( );  Dry Needling     -     mins self-pay    Timed Treatment:   55   mins   Total Treatment:     63   mins      ANETTE Heath License #[id number]    Physical Therapist

## 2019-08-13 NOTE — ED PROVIDER NOTES
" EMERGENCY DEPARTMENT ENCOUNTER    CHIEF COMPLAINT  Chief Complaint: Head Injury  History given by: patient  History limited by: none  Room Number: 06/06  PMD: Claudette Lux PA-C      HPI:  Pt is a 43 y.o. male who presents with head injury that happened last night around 2200 when he was \"consolidating some trash\" at work and hit the front of his head on a counter. Pt states that he remembers hitting his head, but thinks that he briefly lost consciousness after the injury. Pt c/o HA, neck pain, dizziness, and nausea. Pt states he has hx of HTN for which he takes medications. Pt states he is not currently taking any blood thinners. Pt states he has hx of concussion when he was 13 years old.     Location of Head Injury- frontal  Anticoagulants - No  Associated Symptoms - HA, neck pain, dizziness, and nausea  Aggravating Factors - palpation   History of Bleeding Disorder - No  LOC - Yes (brief)  Greater than Age 65: No      PAST MEDICAL HISTORY  Active Ambulatory Problems     Diagnosis Date Noted   • Type 2 diabetes mellitus without complication, without long-term current use of insulin (CMS/LTAC, located within [facility]) 09/19/2018   • GERD (gastroesophageal reflux disease) 09/19/2018   • Essential hypertension 09/19/2018   • Mixed hyperlipidemia 09/19/2018   • Anxiety 09/19/2018   • Vitamin D deficiency 09/19/2018   • Mononeuropathy 09/19/2018   • Vitamin D deficiency    • Folic acid deficiency      Resolved Ambulatory Problems     Diagnosis Date Noted   • No Resolved Ambulatory Problems     Past Medical History:   Diagnosis Date   • Anemia    • ED (erectile dysfunction)    • Folic acid deficiency    • GERD (gastroesophageal reflux disease)    • Hyperlipidemia        PAST SURGICAL HISTORY  Past Surgical History:   Procedure Laterality Date   • TONSILLECTOMY         FAMILY HISTORY  Family History   Problem Relation Age of Onset   • Hypertension Mother    • Diabetes Maternal Grandmother    • Cancer Maternal Grandmother    • Alcohol abuse " Father    • Cancer Maternal Aunt    • Alcohol abuse Paternal Grandfather        SOCIAL HISTORY  Social History     Socioeconomic History   • Marital status:      Spouse name: Not on file   • Number of children: Not on file   • Years of education: Not on file   • Highest education level: Not on file   Social Needs   • Financial resource strain: Not on file   • Food insecurity - worry: Not on file   • Food insecurity - inability: Not on file   • Transportation needs - medical: Not on file   • Transportation needs - non-medical: Not on file   Occupational History   • Not on file   Tobacco Use   • Smoking status: Never Smoker   • Smokeless tobacco: Never Used   Substance and Sexual Activity   • Alcohol use: No   • Drug use: Defer   • Sexual activity: Defer   Other Topics Concern   • Not on file   Social History Narrative   • Not on file       ALLERGIES  Patient has no known allergies.    REVIEW OF SYSTEMS  Review of Systems   Constitutional: Negative for fever.   HENT:        Pt c/o pain and tenderness to the R side of his head.   Eyes: Negative.    Respiratory: Negative for shortness of breath.    Cardiovascular: Negative for chest pain and leg swelling.   Gastrointestinal: Positive for nausea. Negative for abdominal pain, diarrhea and vomiting.   Endocrine: Negative.    Musculoskeletal: Positive for neck pain.   Allergic/Immunologic: Negative.    Neurological: Positive for dizziness and headaches. Negative for weakness and numbness.   Hematological: Negative.    Psychiatric/Behavioral: Negative.    All other systems reviewed and are negative.      PHYSICAL EXAM  ED Triage Vitals   Temp Heart Rate Resp BP SpO2   03/15/19 1008 03/15/19 1008 03/15/19 1008 03/15/19 1018 03/15/19 1008   96.5 °F (35.8 °C) 74 16 153/91 95 %      Temp src Heart Rate Source Patient Position BP Location FiO2 (%)   03/15/19 1008 -- -- -- --   Oral           Physical Exam   Constitutional: No distress.   HENT:   Head: Normocephalic and  atraumatic.   There is a very small, linear abrasion to the R side of pt's forehead and there is tenderness to the R side of his head.    Eyes: EOM are normal.   Neck: Normal range of motion.   Pt's neck is non-tender   Pulmonary/Chest: No respiratory distress.   Abdominal: There is no tenderness.   Musculoskeletal: He exhibits no edema.   Neurological: He is alert. GCS score is 15.   Skin: Skin is warm and dry.   Nursing note and vitals reviewed.        RADIOLOGY  CT Head Without Contrast   Preliminary Result   No acute process identified.       Radiation dose reduction techniques were utilized, including automated   exposure control and exposure modulation based on body size.                   I ordered the above noted radiological studies. Interpreted by radiologist. Reviewed by me in PACS.       PROCEDURES  Procedures      PROGRESS AND CONSULTS     1034 CT Head ordered for further evaluation. Tylenol ordered for further tx.     1227 Rechecked pt. Pt is resting comfortably. Notified pt of CT Head results (negative for anything acute). I told the pt that he likely has a concussion that is best treated with rest. Discussed the plan to discharge the pt home. I instructed the pt to follow up with his PCP in 3 days if his symptoms persist. Pt understands and agrees with the plan, all questions answered.    MEDICAL DECISION MAKING  Results were reviewed/discussed with the patient and they were also made aware of online access. Pt also made aware that some labs, such as cultures, will not be resulted during ER visit and follow up with PMD is necessary.     MDM  Number of Diagnoses or Management Options  Concussion with loss of consciousness of 30 minutes or less, initial encounter:      Amount and/or Complexity of Data Reviewed  Tests in the radiology section of CPT®: ordered and reviewed (CT Head - negative acute)  Independent visualization of images, tracings, or specimens: yes           DIAGNOSIS  Final diagnoses:    Concussion with loss of consciousness of 30 minutes or less, initial encounter       DISPOSITION  DISCHARGE    Patient discharged in stable condition.    Reviewed implications of results, diagnosis, meds, responsibility to follow up, warning signs and symptoms of possible worsening, potential complications and reasons to return to ER.    Patient/Family voiced understanding of above instructions.    Discussed plan for discharge, as there is no emergent indication for admission. Patient referred to primary care provider for BP management due to today's BP. Pt/family is agreeable and understands need for follow up and repeat testing.  Pt is aware that discharge does not mean that nothing is wrong but it indicates no emergency is present that requires admission and they must continue care with follow-up as given below or physician of their choice.     FOLLOW-UP  Claudette Lux, PA-C  [id number] [facility].[address] [place], [place] [id number] [phone]    Call in 3 days  If symptoms persist         Medication List      No changes were made to your prescriptions during this visit.           Latest Documented Vital Signs:  As of 12:51 PM  BP- 153/91 HR- 74 Temp- 96.5 °F (35.8 °C) (Oral) O2 sat- 95%    --  Documentation assistance provided by shakira Brandon for Dr. Hansen.  Information recorded by the scribe was done at my direction and has been verified and validated by me.       Jose E Brandon  03/15/19 2670       Luisito Hansen MD  03/15/19 3514     No

## 2019-08-28 RX ORDER — CALCIUM CARBONATE/VITAMIN D3 600 MG-20
TABLET ORAL
Qty: 30 CAPSULE | Refills: 11 | Status: SHIPPED | OUTPATIENT
Start: 2019-08-28 | End: 2020-06-30

## 2019-09-11 ENCOUNTER — RESULTS ENCOUNTER (OUTPATIENT)
Dept: FAMILY MEDICINE CLINIC | Facility: CLINIC | Age: 44
End: 2019-09-11

## 2019-09-11 DIAGNOSIS — K21.9 GASTROESOPHAGEAL REFLUX DISEASE WITHOUT ESOPHAGITIS: ICD-10-CM

## 2019-09-11 DIAGNOSIS — E55.9 VITAMIN D DEFICIENCY: ICD-10-CM

## 2019-09-11 DIAGNOSIS — I10 ESSENTIAL HYPERTENSION: ICD-10-CM

## 2019-09-11 DIAGNOSIS — F41.9 ANXIETY: ICD-10-CM

## 2019-09-11 DIAGNOSIS — G89.29 CHRONIC BILATERAL LOW BACK PAIN, WITH SCIATICA PRESENCE UNSPECIFIED: ICD-10-CM

## 2019-09-11 DIAGNOSIS — E11.65 TYPE 2 DIABETES MELLITUS WITH HYPERGLYCEMIA, WITHOUT LONG-TERM CURRENT USE OF INSULIN (HCC): ICD-10-CM

## 2019-09-11 DIAGNOSIS — E78.2 MIXED HYPERLIPIDEMIA: ICD-10-CM

## 2019-09-11 DIAGNOSIS — E53.8 FOLIC ACID DEFICIENCY: ICD-10-CM

## 2019-09-11 DIAGNOSIS — M54.5 CHRONIC BILATERAL LOW BACK PAIN, WITH SCIATICA PRESENCE UNSPECIFIED: ICD-10-CM

## 2019-12-01 RX ORDER — FOLIC ACID 1 MG/1
TABLET ORAL
Qty: 30 TABLET | Refills: 5 | Status: SHIPPED | OUTPATIENT
Start: 2019-12-01 | End: 2020-05-29

## 2019-12-06 RX ORDER — ESCITALOPRAM OXALATE 20 MG/1
TABLET ORAL
Qty: 30 TABLET | Refills: 0 | Status: SHIPPED | OUTPATIENT
Start: 2019-12-06 | End: 2019-12-11 | Stop reason: SDUPTHER

## 2019-12-11 ENCOUNTER — OFFICE VISIT (OUTPATIENT)
Dept: FAMILY MEDICINE CLINIC | Facility: CLINIC | Age: 44
End: 2019-12-11

## 2019-12-11 VITALS
BODY MASS INDEX: 40.43 KG/M2 | RESPIRATION RATE: 16 BRPM | WEIGHT: 315 LBS | HEART RATE: 82 BPM | TEMPERATURE: 97.8 F | HEIGHT: 74 IN | DIASTOLIC BLOOD PRESSURE: 72 MMHG | OXYGEN SATURATION: 100 % | SYSTOLIC BLOOD PRESSURE: 120 MMHG

## 2019-12-11 DIAGNOSIS — I10 ESSENTIAL HYPERTENSION: ICD-10-CM

## 2019-12-11 DIAGNOSIS — E78.2 MIXED HYPERLIPIDEMIA: ICD-10-CM

## 2019-12-11 DIAGNOSIS — E55.9 VITAMIN D DEFICIENCY: ICD-10-CM

## 2019-12-11 DIAGNOSIS — E53.8 FOLIC ACID DEFICIENCY: ICD-10-CM

## 2019-12-11 DIAGNOSIS — E11.8 CONTROLLED TYPE 2 DIABETES MELLITUS WITH COMPLICATION, WITHOUT LONG-TERM CURRENT USE OF INSULIN (HCC): Primary | ICD-10-CM

## 2019-12-11 DIAGNOSIS — G47.30 OBSERVED SLEEP APNEA: ICD-10-CM

## 2019-12-11 DIAGNOSIS — F41.9 ANXIETY: ICD-10-CM

## 2019-12-11 DIAGNOSIS — K21.9 GASTROESOPHAGEAL REFLUX DISEASE WITHOUT ESOPHAGITIS: ICD-10-CM

## 2019-12-11 DIAGNOSIS — F41.1 GAD (GENERALIZED ANXIETY DISORDER): ICD-10-CM

## 2019-12-11 PROBLEM — M54.50 BACK PAIN AT L4-L5 LEVEL: Status: ACTIVE | Noted: 2019-05-01

## 2019-12-11 PROBLEM — L40.9 PSORIASIS: Status: ACTIVE | Noted: 2019-12-11

## 2019-12-11 PROBLEM — L03.119 CELLULITIS, LEG: Status: ACTIVE | Noted: 2019-12-11

## 2019-12-11 PROCEDURE — 99214 OFFICE O/P EST MOD 30 MIN: CPT | Performed by: PHYSICIAN ASSISTANT

## 2019-12-11 RX ORDER — AMLODIPINE BESYLATE 5 MG/1
5 TABLET ORAL DAILY
Qty: 30 TABLET | Refills: 5 | Status: SHIPPED | OUTPATIENT
Start: 2019-12-11 | End: 2020-07-23 | Stop reason: SDUPTHER

## 2019-12-11 RX ORDER — AZITHROMYCIN 250 MG/1
TABLET, FILM COATED ORAL
Qty: 6 TABLET | Refills: 1 | Status: SHIPPED | OUTPATIENT
Start: 2019-12-11 | End: 2020-07-23

## 2019-12-11 RX ORDER — ESCITALOPRAM OXALATE 20 MG/1
TABLET ORAL
Qty: 30 TABLET | Refills: 11 | Status: SHIPPED | OUTPATIENT
Start: 2019-12-11 | End: 2020-01-09 | Stop reason: SDUPTHER

## 2019-12-11 RX ORDER — VALSARTAN AND HYDROCHLOROTHIAZIDE 160; 12.5 MG/1; MG/1
1 TABLET, FILM COATED ORAL DAILY
Qty: 30 TABLET | Refills: 5 | Status: SHIPPED | OUTPATIENT
Start: 2019-12-11 | End: 2020-05-31

## 2019-12-11 NOTE — PATIENT INSTRUCTIONS
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
## 2019-12-12 DIAGNOSIS — E11.8 CONTROLLED TYPE 2 DIABETES MELLITUS WITH COMPLICATION, WITHOUT LONG-TERM CURRENT USE OF INSULIN (HCC): Primary | ICD-10-CM

## 2019-12-12 LAB
25(OH)D3+25(OH)D2 SERPL-MCNC: 25.9 NG/ML (ref 30–100)
ALBUMIN SERPL-MCNC: 4.4 G/DL (ref 3.5–5.2)
ALBUMIN/GLOB SERPL: 1.7 G/DL
ALP SERPL-CCNC: 62 U/L (ref 39–117)
ALT SERPL-CCNC: 38 U/L (ref 1–41)
AST SERPL-CCNC: 25 U/L (ref 1–40)
BILIRUB SERPL-MCNC: 0.7 MG/DL (ref 0.2–1.2)
BUN SERPL-MCNC: 15 MG/DL (ref 6–20)
BUN/CREAT SERPL: 15.3 (ref 7–25)
CALCIUM SERPL-MCNC: 8.9 MG/DL (ref 8.6–10.5)
CHLORIDE SERPL-SCNC: 99 MMOL/L (ref 98–107)
CO2 SERPL-SCNC: 24.7 MMOL/L (ref 22–29)
CREAT SERPL-MCNC: 0.98 MG/DL (ref 0.76–1.27)
GLOBULIN SER CALC-MCNC: 2.6 GM/DL
GLUCOSE SERPL-MCNC: 263 MG/DL (ref 65–99)
HBA1C MFR BLD: 11.8 % (ref 4.8–5.6)
POTASSIUM SERPL-SCNC: 4.6 MMOL/L (ref 3.5–5.2)
PROT SERPL-MCNC: 7 G/DL (ref 6–8.5)
SODIUM SERPL-SCNC: 137 MMOL/L (ref 136–145)

## 2020-01-09 RX ORDER — ESCITALOPRAM OXALATE 20 MG/1
TABLET ORAL
Qty: 90 TABLET | Refills: 2 | Status: SHIPPED | OUTPATIENT
Start: 2020-01-09 | End: 2020-04-10 | Stop reason: SDUPTHER

## 2020-03-05 ENCOUNTER — RESULTS ENCOUNTER (OUTPATIENT)
Dept: FAMILY MEDICINE CLINIC | Facility: CLINIC | Age: 45
End: 2020-03-05

## 2020-03-05 DIAGNOSIS — E11.8 CONTROLLED TYPE 2 DIABETES MELLITUS WITH COMPLICATION, WITHOUT LONG-TERM CURRENT USE OF INSULIN (HCC): ICD-10-CM

## 2020-04-10 RX ORDER — ESCITALOPRAM OXALATE 20 MG/1
TABLET ORAL
Qty: 90 TABLET | Refills: 2 | Status: SHIPPED | OUTPATIENT
Start: 2020-04-10 | End: 2020-07-23 | Stop reason: SDUPTHER

## 2020-05-11 RX ORDER — METFORMIN HYDROCHLORIDE 500 MG/1
TABLET, EXTENDED RELEASE ORAL
Qty: 120 TABLET | Refills: 0 | Status: SHIPPED | OUTPATIENT
Start: 2020-05-11 | End: 2020-06-21

## 2020-05-27 RX ORDER — DULAGLUTIDE 0.75 MG/.5ML
INJECTION, SOLUTION SUBCUTANEOUS
Qty: 2 PEN | Refills: 1 | Status: SHIPPED | OUTPATIENT
Start: 2020-05-27 | End: 2020-07-23 | Stop reason: SDUPTHER

## 2020-05-29 RX ORDER — FOLIC ACID 1 MG/1
TABLET ORAL
Qty: 90 TABLET | Refills: 0 | Status: SHIPPED | OUTPATIENT
Start: 2020-05-29 | End: 2020-07-23 | Stop reason: SDUPTHER

## 2020-05-31 RX ORDER — VALSARTAN AND HYDROCHLOROTHIAZIDE 160; 12.5 MG/1; MG/1
TABLET, FILM COATED ORAL
Qty: 30 TABLET | Refills: 0 | Status: SHIPPED | OUTPATIENT
Start: 2020-05-31 | End: 2020-06-27

## 2020-06-21 RX ORDER — METFORMIN HYDROCHLORIDE 500 MG/1
TABLET, EXTENDED RELEASE ORAL
Qty: 120 TABLET | Refills: 0 | Status: SHIPPED | OUTPATIENT
Start: 2020-06-21 | End: 2020-07-09 | Stop reason: SDUPTHER

## 2020-06-27 RX ORDER — VALSARTAN AND HYDROCHLOROTHIAZIDE 160; 12.5 MG/1; MG/1
TABLET, FILM COATED ORAL
Qty: 30 TABLET | Refills: 0 | Status: SHIPPED | OUTPATIENT
Start: 2020-06-27 | End: 2020-07-23 | Stop reason: SDUPTHER

## 2020-06-30 RX ORDER — CALCIUM CARBONATE/VITAMIN D3 600 MG-20
TABLET ORAL
Qty: 90 CAPSULE | Refills: 3 | Status: SHIPPED | OUTPATIENT
Start: 2020-06-30 | End: 2021-08-31

## 2020-06-30 RX ORDER — EMPAGLIFLOZIN 25 MG/1
TABLET, FILM COATED ORAL
Qty: 15 TABLET | Refills: 0 | Status: SHIPPED | OUTPATIENT
Start: 2020-06-30 | End: 2020-07-23 | Stop reason: SDUPTHER

## 2020-07-01 RX ORDER — OMEPRAZOLE 20 MG/1
20 CAPSULE, DELAYED RELEASE ORAL DAILY
Qty: 90 CAPSULE | Refills: 3 | Status: SHIPPED | OUTPATIENT
Start: 2020-07-01 | End: 2021-05-26 | Stop reason: SDUPTHER

## 2020-07-07 RX ORDER — METFORMIN HYDROCHLORIDE 500 MG/1
1000 TABLET, EXTENDED RELEASE ORAL 2 TIMES DAILY WITH MEALS
Qty: 120 TABLET | Refills: 0 | OUTPATIENT
Start: 2020-07-07

## 2020-07-10 RX ORDER — METFORMIN HYDROCHLORIDE 500 MG/1
1000 TABLET, EXTENDED RELEASE ORAL 2 TIMES DAILY WITH MEALS
Qty: 60 TABLET | Refills: 0 | Status: SHIPPED | OUTPATIENT
Start: 2020-07-10 | End: 2020-07-23

## 2020-07-23 ENCOUNTER — OFFICE VISIT (OUTPATIENT)
Dept: FAMILY MEDICINE CLINIC | Facility: CLINIC | Age: 45
End: 2020-07-23

## 2020-07-23 VITALS
BODY MASS INDEX: 40.43 KG/M2 | WEIGHT: 315 LBS | OXYGEN SATURATION: 97 % | TEMPERATURE: 98.4 F | HEIGHT: 74 IN | DIASTOLIC BLOOD PRESSURE: 72 MMHG | SYSTOLIC BLOOD PRESSURE: 126 MMHG | RESPIRATION RATE: 16 BRPM | HEART RATE: 69 BPM

## 2020-07-23 DIAGNOSIS — L40.9 PSORIASIS: ICD-10-CM

## 2020-07-23 DIAGNOSIS — F41.9 ANXIETY: ICD-10-CM

## 2020-07-23 DIAGNOSIS — F41.1 GAD (GENERALIZED ANXIETY DISORDER): ICD-10-CM

## 2020-07-23 DIAGNOSIS — I10 ESSENTIAL HYPERTENSION: ICD-10-CM

## 2020-07-23 DIAGNOSIS — E11.65 UNCONTROLLED TYPE 2 DIABETES MELLITUS WITH HYPERGLYCEMIA, WITHOUT LONG-TERM CURRENT USE OF INSULIN (HCC): Primary | ICD-10-CM

## 2020-07-23 DIAGNOSIS — E55.9 VITAMIN D DEFICIENCY: ICD-10-CM

## 2020-07-23 DIAGNOSIS — E78.2 MIXED HYPERLIPIDEMIA: ICD-10-CM

## 2020-07-23 DIAGNOSIS — E53.8 FOLIC ACID DEFICIENCY: ICD-10-CM

## 2020-07-23 DIAGNOSIS — K21.9 GASTROESOPHAGEAL REFLUX DISEASE WITHOUT ESOPHAGITIS: ICD-10-CM

## 2020-07-23 DIAGNOSIS — G47.30 OBSERVED SLEEP APNEA: ICD-10-CM

## 2020-07-23 PROBLEM — G58.9 MONONEUROPATHY: Status: RESOLVED | Noted: 2018-09-19 | Resolved: 2020-07-23

## 2020-07-23 PROCEDURE — 99214 OFFICE O/P EST MOD 30 MIN: CPT | Performed by: PHYSICIAN ASSISTANT

## 2020-07-23 RX ORDER — FOLIC ACID 1 MG/1
1000 TABLET ORAL DAILY
Qty: 90 TABLET | Refills: 3 | Status: SHIPPED | OUTPATIENT
Start: 2020-07-23 | End: 2021-05-26 | Stop reason: SDUPTHER

## 2020-07-23 RX ORDER — ESCITALOPRAM OXALATE 20 MG/1
TABLET ORAL
Qty: 90 TABLET | Refills: 3 | Status: SHIPPED | OUTPATIENT
Start: 2020-07-23 | End: 2021-05-26 | Stop reason: SDUPTHER

## 2020-07-23 RX ORDER — VALSARTAN AND HYDROCHLOROTHIAZIDE 160; 12.5 MG/1; MG/1
1 TABLET, FILM COATED ORAL DAILY
Qty: 30 TABLET | Refills: 5 | Status: SHIPPED | OUTPATIENT
Start: 2020-07-23 | End: 2021-05-26 | Stop reason: SDUPTHER

## 2020-07-23 RX ORDER — USTEKINUMAB 90 MG/ML
INJECTION, SOLUTION SUBCUTANEOUS
COMMUNITY
Start: 2020-06-18

## 2020-07-23 RX ORDER — METFORMIN HYDROCHLORIDE 500 MG/1
1000 TABLET, EXTENDED RELEASE ORAL 2 TIMES DAILY WITH MEALS
Qty: 120 TABLET | Refills: 5 | Status: SHIPPED | OUTPATIENT
Start: 2020-07-23 | End: 2020-10-11

## 2020-07-23 RX ORDER — ROSUVASTATIN CALCIUM 10 MG/1
TABLET, COATED ORAL
Qty: 30 TABLET | Refills: 11 | Status: SHIPPED | OUTPATIENT
Start: 2020-07-23 | End: 2020-07-24

## 2020-07-23 RX ORDER — AMLODIPINE BESYLATE 5 MG/1
5 TABLET ORAL DAILY
Qty: 30 TABLET | Refills: 5 | Status: SHIPPED | OUTPATIENT
Start: 2020-07-23 | End: 2021-05-21

## 2020-07-23 NOTE — PATIENT INSTRUCTIONS

## 2020-07-23 NOTE — PROGRESS NOTES
"Subjective   Ray London is a 44 y.o. male.     History of Present Illness    Since the last visit, he has overall felt fairly well.  He has Essential Hypertension and well controlled on current medication, GERD controlled on PPI Rx, Hyperlipidemia with goals met with current Rx, Vitamin D deficiency and will update labs for continued management and DMII--getting labs today to make sure A1C <7.  he has been compliant with current medications have reviewed them.  The patient only has occas nausea..  Will refill medications. /72 (BP Location: Left arm, Patient Position: Sitting, Cuff Size: Thigh Adult)   Pulse 69   Temp 98.4 °F (36.9 °C) (Skin)   Resp 16   Ht 188 cm (74\")   Wt (!) 177 kg (390 lb)   SpO2 97%   BMI 50.07 kg/m²   He is working on weight loss; he does snore--wakes up tired;   Results for orders placed or performed in visit on 09/11/19   Comprehensive Metabolic Panel   Result Value Ref Range    Glucose 263 (H) 65 - 99 mg/dL    BUN 15 6 - 20 mg/dL    Creatinine 0.98 0.76 - 1.27 mg/dL    eGFR Non African Am 83 >60 mL/min/1.73    eGFR African Am 101 >60 mL/min/1.73    BUN/Creatinine Ratio 15.3 7.0 - 25.0    Sodium 137 136 - 145 mmol/L    Potassium 4.6 3.5 - 5.2 mmol/L    Chloride 99 98 - 107 mmol/L    Total CO2 24.7 22.0 - 29.0 mmol/L    Calcium 8.9 8.6 - 10.5 mg/dL    Total Protein 7.0 6.0 - 8.5 g/dL    Albumin 4.40 3.50 - 5.20 g/dL    Globulin 2.6 gm/dL    A/G Ratio 1.7 g/dL    Total Bilirubin 0.7 0.2 - 1.2 mg/dL    Alkaline Phosphatase 62 39 - 117 U/L    AST (SGOT) 25 1 - 40 U/L    ALT (SGPT) 38 1 - 41 U/L   Hemoglobin A1c   Result Value Ref Range    Hemoglobin A1C 11.80 (H) 4.80 - 5.60 %   Vitamin D 25 Hydroxy   Result Value Ref Range    25 Hydroxy, Vitamin D 25.9 (L) 30.0 - 100.0 ng/ml   also f/u folic acid--taking it  Derm is Dr Leeann Aiken MD; now on Stelara and helping  Glucose has improved; 120s    Ray London male 44 y.o., /72 (BP Location: Left arm, Patient Position: " "Sitting, Cuff Size: Thigh Adult)   Pulse 69   Temp 98.4 °F (36.9 °C) (Skin)   Resp 16   Ht 188 cm (74\")   Wt (!) 177 kg (390 lb)   SpO2 97%   BMI 50.07 kg/m²   who presents today for follow up of Anxiety and Panic Attacks.  He reports medication is working well, patient desires to continue on Rx, and needs refill. Onset of symptoms was approximately several years ago.  He denies current suicidal and homicidal ideation. Risk factors are lifestyle of multiple roles and chronic illness.  Previous treatment includes current Rx.  He complains of the following medication side effects: none.  The patient declines to go to counseling..was on Celexa prior. Job change has helped; supportive wife also.  No current back pain  Right shoulder pain---see ortho  Need sleep study    Watching toes; some mild N/T on Metformin    The following portions of the patient's history were reviewed and updated as appropriate: allergies, current medications, past family history, past medical history, past social history, past surgical history and problem list.    Review of Systems   Constitutional: Negative for activity change, appetite change and unexpected weight change.   HENT: Positive for congestion. Negative for nosebleeds and trouble swallowing.    Eyes: Negative for pain and visual disturbance.   Respiratory: Positive for cough. Negative for chest tightness, shortness of breath and wheezing.    Cardiovascular: Negative for chest pain and palpitations.   Gastrointestinal: Negative for abdominal pain and blood in stool.   Endocrine: Negative.    Genitourinary: Negative for difficulty urinating and hematuria.   Musculoskeletal: Positive for arthralgias. Negative for joint swelling.   Skin: Positive for rash. Negative for color change.   Allergic/Immunologic: Negative.    Neurological: Negative for syncope and speech difficulty.   Hematological: Negative for adenopathy.   Psychiatric/Behavioral: Negative for agitation and confusion. " The patient is not nervous/anxious.    All other systems reviewed and are negative.      Objective   Physical Exam   Constitutional: He is oriented to person, place, and time. He appears well-developed and well-nourished. No distress.   HENT:   Head: Normocephalic and atraumatic.   Eyes: Pupils are equal, round, and reactive to light. Conjunctivae and EOM are normal. Right eye exhibits no discharge. Left eye exhibits no discharge. No scleral icterus.   Neck: Normal range of motion. Neck supple. No tracheal deviation present. No thyromegaly present.   Cardiovascular: Normal rate, regular rhythm, normal heart sounds, intact distal pulses and normal pulses. Exam reveals no gallop.   No murmur heard.  Pulmonary/Chest: Effort normal and breath sounds normal. No respiratory distress. He has no wheezes. He has no rales.   Musculoskeletal: Normal range of motion.   Neurological: He is alert and oriented to person, place, and time. He exhibits normal muscle tone. Coordination normal.   Skin: Skin is warm. No rash noted. No erythema. No pallor.   Psychiatric: He has a normal mood and affect. His behavior is normal. Judgment and thought content normal.   Nursing note and vitals reviewed.      Assessment/Plan   Ray was seen today for diabetes.    Diagnoses and all orders for this visit:    Uncontrolled type 2 diabetes mellitus with hyperglycemia, without long-term current use of insulin (CMS/[facility])    Gastroesophageal reflux disease without esophagitis  -     Comprehensive metabolic panel  -     Lipid panel  -     CBC and Differential  -     TSH  -     Hemoglobin A1c  -     Microalbumin / Creatinine Urine Ratio - Urine, Clean Catch  -     T3, Free  -     T4, Free  -     Vitamin B12  -     Folate  -     Vitamin D 25 Hydroxy    Mixed hyperlipidemia  -     Comprehensive metabolic panel  -     Lipid panel  -     CBC and Differential  -     TSH  -     Hemoglobin A1c  -     Microalbumin / Creatinine Urine Ratio - Urine, Clean  Catch  -     T3, Free  -     T4, Free  -     Vitamin B12  -     Folate  -     Vitamin D 25 Hydroxy    Essential hypertension  -     Comprehensive metabolic panel  -     Lipid panel  -     CBC and Differential  -     TSH  -     Hemoglobin A1c  -     Microalbumin / Creatinine Urine Ratio - Urine, Clean Catch  -     T3, Free  -     T4, Free  -     Vitamin B12  -     Folate  -     Vitamin D 25 Hydroxy    Anxiety  -     Comprehensive metabolic panel  -     Lipid panel  -     CBC and Differential  -     TSH  -     Hemoglobin A1c  -     Microalbumin / Creatinine Urine Ratio - Urine, Clean Catch  -     T3, Free  -     T4, Free  -     Vitamin B12  -     Folate  -     Vitamin D 25 Hydroxy    Vitamin D deficiency  -     Comprehensive metabolic panel  -     Lipid panel  -     CBC and Differential  -     TSH  -     Hemoglobin A1c  -     Microalbumin / Creatinine Urine Ratio - Urine, Clean Catch  -     T3, Free  -     T4, Free  -     Vitamin B12  -     Folate  -     Vitamin D 25 Hydroxy    Folic acid deficiency  -     Comprehensive metabolic panel  -     Lipid panel  -     CBC and Differential  -     TSH  -     Hemoglobin A1c  -     Microalbumin / Creatinine Urine Ratio - Urine, Clean Catch  -     T3, Free  -     T4, Free  -     Vitamin B12  -     Folate  -     Vitamin D 25 Hydroxy    Observed sleep apnea  -     Comprehensive metabolic panel  -     Lipid panel  -     CBC and Differential  -     TSH  -     Hemoglobin A1c  -     Microalbumin / Creatinine Urine Ratio - Urine, Clean Catch  -     T3, Free  -     T4, Free  -     Vitamin B12  -     Folate  -     Vitamin D 25 Hydroxy  -     Ambulatory Referral to Sleep Medicine    Psoriasis  -     Comprehensive metabolic panel  -     Lipid panel  -     CBC and Differential  -     TSH  -     Hemoglobin A1c  -     Microalbumin / Creatinine Urine Ratio - Urine, Clean Catch  -     T3, Free  -     T4, Free  -     Vitamin B12  -     Folate  -     Vitamin D 25 Hydroxy    KERI (generalized  anxiety disorder)  -     Comprehensive metabolic panel  -     Lipid panel  -     CBC and Differential  -     TSH  -     Hemoglobin A1c  -     Microalbumin / Creatinine Urine Ratio - Urine, Clean Catch  -     T3, Free  -     T4, Free  -     Vitamin B12  -     Folate  -     Vitamin D 25 Hydroxy    Other orders  -     valsartan-hydrochlorothiazide (DIOVAN-HCT) 160-12.5 MG per tablet; Take 1 tablet by mouth Daily. for blood pressure.  -     Dulaglutide (Trulicity) 0.75 MG/0.5ML solution pen-injector; Inject 0.75 mg under the skin into the appropriate area as directed 1 (One) Time Per Week.  -     rosuvastatin (CRESTOR) 10 MG tablet; 1 tab PO daily For cholesterol  -     Empagliflozin (Jardiance) 25 MG tablet; Take 25 mg by mouth Daily. For DMII  -     folic acid (FOLVITE) 1 MG tablet; Take 1 tablet by mouth Daily.  -     escitalopram (LEXAPRO) 20 MG tablet; ONE TAB DAILY FOR STRESS  -     metFORMIN ER (GLUCOPHAGE-XR) 500 MG 24 hr tablet; Take 2 tablets by mouth 2 (Two) Times a Day With Meals. For DMII  -     amLODIPine (NORVASC) 5 MG tablet; Take 1 tablet by mouth Daily. For BP    Continue Lexapro for anxiety working well  Getting labs today need to make sure her A1c is at goal last time was in the 11's  Seeing dermatology for psoriasis and on Stelara which is immunosuppressive  Continue folic acid and will measure level  Plan, Ray London, was seen today.  he was seen for HTN and continue medication, DMII and refilled medications, GERD and will continue on PPI medication, Hyperlipidemia and will continue current medication and Vitamin D deficiency and will update labs .

## 2020-07-24 LAB
25(OH)D3+25(OH)D2 SERPL-MCNC: 26.3 NG/ML (ref 30–100)
ALBUMIN SERPL-MCNC: 4.7 G/DL (ref 3.5–5.2)
ALBUMIN/CREAT UR: 12 MG/G CREAT (ref 0–29)
ALBUMIN/GLOB SERPL: 1.8 G/DL
ALP SERPL-CCNC: 66 U/L (ref 39–117)
ALT SERPL-CCNC: 37 U/L (ref 1–41)
AST SERPL-CCNC: 24 U/L (ref 1–40)
BASOPHILS # BLD AUTO: 0.09 10*3/MM3 (ref 0–0.2)
BASOPHILS NFR BLD AUTO: 1.2 % (ref 0–1.5)
BILIRUB SERPL-MCNC: 0.6 MG/DL (ref 0–1.2)
BUN SERPL-MCNC: 14 MG/DL (ref 6–20)
BUN/CREAT SERPL: 12.1 (ref 7–25)
CALCIUM SERPL-MCNC: 9.7 MG/DL (ref 8.6–10.5)
CHLORIDE SERPL-SCNC: 97 MMOL/L (ref 98–107)
CHOLEST SERPL-MCNC: 211 MG/DL (ref 0–200)
CO2 SERPL-SCNC: 24.9 MMOL/L (ref 22–29)
CREAT SERPL-MCNC: 1.16 MG/DL (ref 0.76–1.27)
CREAT UR-MCNC: 40.8 MG/DL
EOSINOPHIL # BLD AUTO: 0.14 10*3/MM3 (ref 0–0.4)
EOSINOPHIL NFR BLD AUTO: 1.8 % (ref 0.3–6.2)
ERYTHROCYTE [DISTWIDTH] IN BLOOD BY AUTOMATED COUNT: 13.1 % (ref 12.3–15.4)
FOLATE SERPL-MCNC: 19.1 NG/ML (ref 4.78–24.2)
GLOBULIN SER CALC-MCNC: 2.6 GM/DL
GLUCOSE SERPL-MCNC: 163 MG/DL (ref 65–99)
HBA1C MFR BLD: 9.1 % (ref 4.8–5.6)
HCT VFR BLD AUTO: 47.7 % (ref 37.5–51)
HDLC SERPL-MCNC: 27 MG/DL (ref 40–60)
HGB BLD-MCNC: 16.5 G/DL (ref 13–17.7)
IMM GRANULOCYTES # BLD AUTO: 0.05 10*3/MM3 (ref 0–0.05)
IMM GRANULOCYTES NFR BLD AUTO: 0.7 % (ref 0–0.5)
LDLC SERPL CALC-MCNC: 129 MG/DL (ref 0–100)
LYMPHOCYTES # BLD AUTO: 2.56 10*3/MM3 (ref 0.7–3.1)
LYMPHOCYTES NFR BLD AUTO: 33.4 % (ref 19.6–45.3)
MCH RBC QN AUTO: 29 PG (ref 26.6–33)
MCHC RBC AUTO-ENTMCNC: 34.6 G/DL (ref 31.5–35.7)
MCV RBC AUTO: 83.8 FL (ref 79–97)
MICROALBUMIN UR-MCNC: 4.8 UG/ML
MONOCYTES # BLD AUTO: 0.62 10*3/MM3 (ref 0.1–0.9)
MONOCYTES NFR BLD AUTO: 8.1 % (ref 5–12)
NEUTROPHILS # BLD AUTO: 4.21 10*3/MM3 (ref 1.7–7)
NEUTROPHILS NFR BLD AUTO: 54.8 % (ref 42.7–76)
NRBC BLD AUTO-RTO: 0 /100 WBC (ref 0–0.2)
PLATELET # BLD AUTO: 249 10*3/MM3 (ref 140–450)
POTASSIUM SERPL-SCNC: 4.7 MMOL/L (ref 3.5–5.2)
PROT SERPL-MCNC: 7.3 G/DL (ref 6–8.5)
RBC # BLD AUTO: 5.69 10*6/MM3 (ref 4.14–5.8)
SODIUM SERPL-SCNC: 135 MMOL/L (ref 136–145)
T3FREE SERPL-MCNC: 2.8 PG/ML (ref 2–4.4)
T4 FREE SERPL-MCNC: 1.42 NG/DL (ref 0.93–1.7)
TRIGL SERPL-MCNC: 275 MG/DL (ref 0–150)
TSH SERPL DL<=0.005 MIU/L-ACNC: 3.09 UIU/ML (ref 0.27–4.2)
VIT B12 SERPL-MCNC: 700 PG/ML (ref 211–946)
VLDLC SERPL CALC-MCNC: 55 MG/DL
WBC # BLD AUTO: 7.67 10*3/MM3 (ref 3.4–10.8)

## 2020-07-24 RX ORDER — ROSUVASTATIN CALCIUM 20 MG/1
20 TABLET, COATED ORAL DAILY
Qty: 30 TABLET | Refills: 11 | Status: SHIPPED | OUTPATIENT
Start: 2020-07-24 | End: 2021-05-26 | Stop reason: SDUPTHER

## 2020-08-25 ENCOUNTER — APPOINTMENT (OUTPATIENT)
Dept: SLEEP MEDICINE | Facility: HOSPITAL | Age: 45
End: 2020-08-25

## 2020-09-02 RX ORDER — DULAGLUTIDE 0.75 MG/.5ML
INJECTION, SOLUTION SUBCUTANEOUS
Qty: 4 PEN | Refills: 5 | Status: SHIPPED | OUTPATIENT
Start: 2020-09-02 | End: 2020-12-01

## 2020-10-16 ENCOUNTER — RESULTS ENCOUNTER (OUTPATIENT)
Dept: FAMILY MEDICINE CLINIC | Facility: CLINIC | Age: 45
End: 2020-10-16

## 2020-10-16 DIAGNOSIS — I10 ESSENTIAL HYPERTENSION: ICD-10-CM

## 2020-10-16 DIAGNOSIS — E78.2 MIXED HYPERLIPIDEMIA: ICD-10-CM

## 2020-10-16 DIAGNOSIS — E11.65 UNCONTROLLED TYPE 2 DIABETES MELLITUS WITH HYPERGLYCEMIA, WITHOUT LONG-TERM CURRENT USE OF INSULIN (HCC): ICD-10-CM

## 2020-10-16 DIAGNOSIS — E55.9 VITAMIN D DEFICIENCY: ICD-10-CM

## 2020-11-27 ENCOUNTER — TRANSCRIBE ORDERS (OUTPATIENT)
Dept: PREADMISSION TESTING | Facility: HOSPITAL | Age: 45
End: 2020-11-27

## 2020-11-27 DIAGNOSIS — Z01.818 OTHER SPECIFIED PRE-OPERATIVE EXAMINATION: Primary | ICD-10-CM

## 2020-12-01 ENCOUNTER — HOSPITAL ENCOUNTER (OUTPATIENT)
Dept: GENERAL RADIOLOGY | Facility: HOSPITAL | Age: 45
Discharge: HOME | End: 2020-12-01

## 2020-12-01 ENCOUNTER — APPOINTMENT (OUTPATIENT)
Dept: PREADMISSION TESTING | Facility: HOSPITAL | Age: 45
End: 2020-12-01

## 2020-12-01 VITALS
DIASTOLIC BLOOD PRESSURE: 82 MMHG | RESPIRATION RATE: 18 BRPM | SYSTOLIC BLOOD PRESSURE: 149 MMHG | TEMPERATURE: 98.4 F | HEART RATE: 80 BPM | OXYGEN SATURATION: 98 % | WEIGHT: 315 LBS | HEIGHT: 72 IN | BODY MASS INDEX: 42.66 KG/M2

## 2020-12-01 LAB
ABO GROUP BLD: NORMAL
ALBUMIN SERPL-MCNC: 4.5 G/DL (ref 3.5–5.2)
ALBUMIN/GLOB SERPL: 1.5 G/DL
ALP SERPL-CCNC: 74 U/L (ref 39–117)
ALT SERPL W P-5'-P-CCNC: 31 U/L (ref 1–41)
ANION GAP SERPL CALCULATED.3IONS-SCNC: 10.6 MMOL/L (ref 5–15)
AST SERPL-CCNC: 21 U/L (ref 1–40)
BILIRUB SERPL-MCNC: 0.5 MG/DL (ref 0–1.2)
BLD GP AB SCN SERPL QL: NEGATIVE
BUN SERPL-MCNC: 17 MG/DL (ref 6–20)
BUN/CREAT SERPL: 14.5 (ref 7–25)
CALCIUM SPEC-SCNC: 9.3 MG/DL (ref 8.6–10.5)
CHLORIDE SERPL-SCNC: 99 MMOL/L (ref 98–107)
CO2 SERPL-SCNC: 24.4 MMOL/L (ref 22–29)
CREAT SERPL-MCNC: 1.17 MG/DL (ref 0.76–1.27)
DEPRECATED RDW RBC AUTO: 40.8 FL (ref 37–54)
ERYTHROCYTE [DISTWIDTH] IN BLOOD BY AUTOMATED COUNT: 13.1 % (ref 12.3–15.4)
GFR SERPL CREATININE-BSD FRML MDRD: 68 ML/MIN/1.73
GLOBULIN UR ELPH-MCNC: 3.1 GM/DL
GLUCOSE SERPL-MCNC: 172 MG/DL (ref 65–99)
HCT VFR BLD AUTO: 49.3 % (ref 37.5–51)
HGB BLD-MCNC: 16.7 G/DL (ref 13–17.7)
MCH RBC QN AUTO: 29 PG (ref 26.6–33)
MCHC RBC AUTO-ENTMCNC: 33.9 G/DL (ref 31.5–35.7)
MCV RBC AUTO: 85.7 FL (ref 79–97)
PLATELET # BLD AUTO: 243 10*3/MM3 (ref 140–450)
PMV BLD AUTO: 9.6 FL (ref 6–12)
POTASSIUM SERPL-SCNC: 4.7 MMOL/L (ref 3.5–5.2)
PROT SERPL-MCNC: 7.6 G/DL (ref 6–8.5)
QT INTERVAL: 396 MS
RBC # BLD AUTO: 5.75 10*6/MM3 (ref 4.14–5.8)
RH BLD: POSITIVE
SODIUM SERPL-SCNC: 134 MMOL/L (ref 136–145)
T&S EXPIRATION DATE: NORMAL
WBC # BLD AUTO: 10.64 10*3/MM3 (ref 3.4–10.8)

## 2020-12-01 PROCEDURE — 36415 COLL VENOUS BLD VENIPUNCTURE: CPT

## 2020-12-01 PROCEDURE — 86850 RBC ANTIBODY SCREEN: CPT

## 2020-12-01 PROCEDURE — 86901 BLOOD TYPING SEROLOGIC RH(D): CPT

## 2020-12-01 PROCEDURE — 86900 BLOOD TYPING SEROLOGIC ABO: CPT

## 2020-12-01 PROCEDURE — 85027 COMPLETE CBC AUTOMATED: CPT

## 2020-12-01 PROCEDURE — 80053 COMPREHEN METABOLIC PANEL: CPT

## 2020-12-01 PROCEDURE — 93010 ELECTROCARDIOGRAM REPORT: CPT | Performed by: INTERNAL MEDICINE

## 2020-12-01 PROCEDURE — 93005 ELECTROCARDIOGRAM TRACING: CPT

## 2020-12-01 PROCEDURE — 71046 X-RAY EXAM CHEST 2 VIEWS: CPT

## 2020-12-01 NOTE — DISCHARGE INSTRUCTIONS
Take the following medications the morning of surgery with a small sip of water: NONE    ARRIVAL TIME: 515AM      General Instructions:  • Do not eat or drink anything after midnight the night before surgery.      .  • Bring any papers given to you in the doctor’s office.  • Wear clean comfortable clothes.  • Do not wear contact lenses, false eyelashes or make-up.  Bring a case for your glasses.   • Bring crutches or walker if applicable.  • Remove all piercings.  Leave jewelry and any other valuables at home.  • Hair extensions with metal clips must be removed prior to surgery.  • The Pre-Admission Testing nurse will instruct you to bring medications if unable to obtain an accurate list in Pre-Admission Testing.        ****If you were given a blood bank ID arm band remember to bring it with you the day of surgery.*****    Preventing a Surgical Site Infection:  • For 2 to 3 days before surgery, avoid shaving with a razor because the razor can irritate skin and make it easier to develop an infection.    • Any areas of open skin can increase the risk of a post-operative wound infection by allowing bacteria to enter and travel throughout the body.  Notify your surgeon if you have any skin wounds / rashes even if it is not near the expected surgical site.  The area will need assessed to determine if surgery should be delayed until it is healed.  • The night prior to surgery shower using a fresh bar of anti-bacterial soap (such as Dial) and clean washcloth.  Sleep in a clean bed with clean clothing.  Do not allow pets to sleep with you.  • Shower on the morning of surgery using a fresh bar of anti-bacterial soap (such as Dial) and clean washcloth.  Dry with a clean towel and dress in clean clothing.  • Ask your surgeon if you will be receiving antibiotics prior to surgery.  • Make sure you, your family, and all healthcare providers clean their hands with soap and water or an alcohol based hand  before caring for  you or your wound.    Day of surgery:  Your arrival time is approximately two hours before your scheduled surgery time.  Upon arrival, a Pre-op nurse and Anesthesiologist will review your health history, obtain vital signs, and answer questions you may have.  The only belongings needed at this time will be your home medications and if applicable your C-PAP/BI-PAP machine.  A Pre-op nurse will start an IV and you may receive medication in preparation for surgery, including something to help you relax.      Please be aware that surgery does come with discomfort.  We want to make every effort to control your discomfort so please discuss any uncontrolled symptoms with your nurse.   Your doctor will most likely have prescribed pain medications.      If you are going home after surgery you will receive individualized written care instructions before being discharged.  A responsible adult must drive you to and from the hospital on the day of your surgery and stay with you for 24 hours.    If you are staying overnight following surgery, you will be transported to your hospital room following the recovery period.  [facility] has all private rooms.    If you have any questions please call Pre-Admission Testing at [phone].  Deductibles and co-payments are collected on the day of service. Please be prepared to pay the required co-pay, deductible or deposit on the day of service as defined by your plan.    Patient Education for Self-Quarantine Process    Following your COVID testing, we strongly recommend that you do not leave your home after you have been tested for COVID except to get medical care. This includes not going to work, school or to public areas.  If this is not possible for you to do please limit your activities to only required outings.  Be sure to wear a mask when you are with other people, practice social distancing and wash your hands frequently.      The following items provide additional  details to keep you safe.  • Wash your hands with soap and water frequently for at least 20 seconds.   • Avoid touching your eyes, nose and mouth with unwashed hands.  • Do not share anything - utensils, towels, food from the same bowl.   • Have your own utensils, drinking glass, dishes, towels and bedding.   • Do not have visitors.   • Do use FaceTime to stay in touch with family and friends.  • You should stay in a specific room away from others if possible.   • Stay at least 6 feet away from others in the home if you cannot have a dedicated room to yourself.   • Do not snuggle with your pet. While the CDC says there is no evidence that pets can spread COVID-19 or be infected from humans, it is probably best to avoid “petting, snuggling, being kissed or licked and sharing food (during self-quarantine)”, according to the CDC.   • Sanitize household surfaces daily. Include all high touch areas (door handles, light switches, phones, countertops, etc.)  • Do not share a bathroom with others, if possible.   • Wear a mask around others in your home if you are unable to stay in a separate room or 6 feet apart. If  you are unable to wear a mask, have your family member wear a mask if they must be within 6 feet of you.   Call your surgeon immediately if you experience any of the following symptoms:  • Sore Throat  • Shortness of Breath or difficulty breathing  • Cough  • Chills  • Body soreness or muscle pain  • Headache  • Fever  • New loss of taste or smell  • Do not arrive for your surgery ill.  Your procedure will need to be rescheduled to another time.  You will need to call your physician before the day of surgery to avoid any unnecessary exposure to hospital staff as well as other patients.

## 2020-12-05 ENCOUNTER — LAB (OUTPATIENT)
Dept: LAB | Facility: HOSPITAL | Age: 45
End: 2020-12-05

## 2020-12-05 DIAGNOSIS — Z01.818 OTHER SPECIFIED PRE-OPERATIVE EXAMINATION: ICD-10-CM

## 2020-12-05 PROCEDURE — U0004 COV-19 TEST NON-CDC HGH THRU: HCPCS

## 2020-12-05 PROCEDURE — C9803 HOPD COVID-19 SPEC COLLECT: HCPCS

## 2020-12-07 LAB — SARS-COV-2 RNA RESP QL NAA+PROBE: NOT DETECTED

## 2020-12-08 ENCOUNTER — HOSPITAL ENCOUNTER (INPATIENT)
Facility: HOSPITAL | Age: 45
LOS: 3 days | Discharge: HOME HEALTH | End: 2020-12-13
Attending: UROLOGY | Admitting: UROLOGY

## 2020-12-08 ENCOUNTER — ANESTHESIA (OUTPATIENT)
Dept: PERIOP | Facility: HOSPITAL | Age: 45
End: 2020-12-08

## 2020-12-08 ENCOUNTER — ANESTHESIA EVENT (OUTPATIENT)
Dept: PERIOP | Facility: HOSPITAL | Age: 45
End: 2020-12-08

## 2020-12-08 DIAGNOSIS — N48.83 ACQUIRED BURIED PENIS: Primary | ICD-10-CM

## 2020-12-08 LAB
GLUCOSE BLDC GLUCOMTR-MCNC: 180 MG/DL (ref 70–130)
GLUCOSE BLDC GLUCOMTR-MCNC: 195 MG/DL (ref 70–130)

## 2020-12-08 PROCEDURE — 25010000002 CEFAZOLIN PER 500 MG: Performed by: UROLOGY

## 2020-12-08 PROCEDURE — 25010000003 EPINEPHRINE 30 MG/30ML SOLUTION 30 ML VIAL: Performed by: UROLOGY

## 2020-12-08 PROCEDURE — 25010000002 PHENYLEPHRINE PER 1 ML: Performed by: NURSE ANESTHETIST, CERTIFIED REGISTERED

## 2020-12-08 PROCEDURE — 25010000002 ONDANSETRON PER 1 MG: Performed by: NURSE ANESTHETIST, CERTIFIED REGISTERED

## 2020-12-08 PROCEDURE — 25010000002 PROPOFOL 10 MG/ML EMULSION: Performed by: NURSE ANESTHETIST, CERTIFIED REGISTERED

## 2020-12-08 PROCEDURE — 25010000002 FENTANYL CITRATE (PF) 100 MCG/2ML SOLUTION: Performed by: NURSE ANESTHETIST, CERTIFIED REGISTERED

## 2020-12-08 PROCEDURE — 25010000002 MIDAZOLAM PER 1 MG: Performed by: ANESTHESIOLOGY

## 2020-12-08 PROCEDURE — 25010000002 HYDROMORPHONE PER 4 MG: Performed by: NURSE ANESTHETIST, CERTIFIED REGISTERED

## 2020-12-08 PROCEDURE — 25010000002 HYDROMORPHONE PER 4 MG: Performed by: UROLOGY

## 2020-12-08 PROCEDURE — 88304 TISSUE EXAM BY PATHOLOGIST: CPT | Performed by: UROLOGY

## 2020-12-08 PROCEDURE — 82962 GLUCOSE BLOOD TEST: CPT

## 2020-12-08 DEVICE — SEALANT WND FIBRIN TISSEEL PREFIL/SYR/PRIMAFZ 4ML: Type: IMPLANTABLE DEVICE | Site: PENIS | Status: FUNCTIONAL

## 2020-12-08 RX ORDER — FLUMAZENIL 0.1 MG/ML
0.2 INJECTION INTRAVENOUS AS NEEDED
Status: DISCONTINUED | OUTPATIENT
Start: 2020-12-08 | End: 2020-12-08 | Stop reason: HOSPADM

## 2020-12-08 RX ORDER — OXYCODONE AND ACETAMINOPHEN 7.5; 325 MG/1; MG/1
1 TABLET ORAL ONCE AS NEEDED
Status: COMPLETED | OUTPATIENT
Start: 2020-12-08 | End: 2020-12-08

## 2020-12-08 RX ORDER — SODIUM CHLORIDE 0.9 % (FLUSH) 0.9 %
10 SYRINGE (ML) INJECTION AS NEEDED
Status: DISCONTINUED | OUTPATIENT
Start: 2020-12-08 | End: 2020-12-13 | Stop reason: HOSPADM

## 2020-12-08 RX ORDER — LABETALOL HYDROCHLORIDE 5 MG/ML
INJECTION, SOLUTION INTRAVENOUS AS NEEDED
Status: DISCONTINUED | OUTPATIENT
Start: 2020-12-08 | End: 2020-12-08 | Stop reason: SURG

## 2020-12-08 RX ORDER — ACETAMINOPHEN 325 MG/1
650 TABLET ORAL EVERY 4 HOURS PRN
Status: DISCONTINUED | OUTPATIENT
Start: 2020-12-08 | End: 2020-12-13 | Stop reason: HOSPADM

## 2020-12-08 RX ORDER — NALOXONE HCL 0.4 MG/ML
0.1 VIAL (ML) INJECTION
Status: DISCONTINUED | OUTPATIENT
Start: 2020-12-08 | End: 2020-12-13 | Stop reason: HOSPADM

## 2020-12-08 RX ORDER — SODIUM CHLORIDE 0.9 % (FLUSH) 0.9 %
3 SYRINGE (ML) INJECTION EVERY 12 HOURS SCHEDULED
Status: DISCONTINUED | OUTPATIENT
Start: 2020-12-08 | End: 2020-12-13 | Stop reason: HOSPADM

## 2020-12-08 RX ORDER — ROSUVASTATIN CALCIUM 20 MG/1
20 TABLET, COATED ORAL NIGHTLY
Status: DISCONTINUED | OUTPATIENT
Start: 2020-12-08 | End: 2020-12-13 | Stop reason: HOSPADM

## 2020-12-08 RX ORDER — AMLODIPINE BESYLATE 5 MG/1
5 TABLET ORAL NIGHTLY
Status: DISCONTINUED | OUTPATIENT
Start: 2020-12-08 | End: 2020-12-13 | Stop reason: HOSPADM

## 2020-12-08 RX ORDER — SODIUM CHLORIDE, SODIUM LACTATE, POTASSIUM CHLORIDE, CALCIUM CHLORIDE 600; 310; 30; 20 MG/100ML; MG/100ML; MG/100ML; MG/100ML
9 INJECTION, SOLUTION INTRAVENOUS CONTINUOUS
Status: DISCONTINUED | OUTPATIENT
Start: 2020-12-08 | End: 2020-12-13 | Stop reason: HOSPADM

## 2020-12-08 RX ORDER — LIDOCAINE HYDROCHLORIDE 20 MG/ML
INJECTION, SOLUTION INFILTRATION; PERINEURAL AS NEEDED
Status: DISCONTINUED | OUTPATIENT
Start: 2020-12-08 | End: 2020-12-08 | Stop reason: SURG

## 2020-12-08 RX ORDER — ESCITALOPRAM OXALATE 20 MG/1
20 TABLET ORAL NIGHTLY
Status: DISCONTINUED | OUTPATIENT
Start: 2020-12-08 | End: 2020-12-13 | Stop reason: HOSPADM

## 2020-12-08 RX ORDER — DIPHENHYDRAMINE HYDROCHLORIDE 50 MG/ML
12.5 INJECTION INTRAMUSCULAR; INTRAVENOUS
Status: DISCONTINUED | OUTPATIENT
Start: 2020-12-08 | End: 2020-12-08 | Stop reason: HOSPADM

## 2020-12-08 RX ORDER — ERYTHROMYCIN 5 MG/G
OINTMENT OPHTHALMIC EVERY 12 HOURS
Status: CANCELLED | OUTPATIENT
Start: 2020-12-08

## 2020-12-08 RX ORDER — ONDANSETRON 4 MG/1
4 TABLET, FILM COATED ORAL EVERY 6 HOURS PRN
Status: DISCONTINUED | OUTPATIENT
Start: 2020-12-08 | End: 2020-12-13 | Stop reason: HOSPADM

## 2020-12-08 RX ORDER — PROPOFOL 10 MG/ML
VIAL (ML) INTRAVENOUS AS NEEDED
Status: DISCONTINUED | OUTPATIENT
Start: 2020-12-08 | End: 2020-12-08 | Stop reason: SURG

## 2020-12-08 RX ORDER — HYDROMORPHONE HYDROCHLORIDE 1 MG/ML
0.5 INJECTION, SOLUTION INTRAMUSCULAR; INTRAVENOUS; SUBCUTANEOUS
Status: DISCONTINUED | OUTPATIENT
Start: 2020-12-08 | End: 2020-12-08 | Stop reason: HOSPADM

## 2020-12-08 RX ORDER — CEFAZOLIN SODIUM IN 0.9 % NACL 3 G/100 ML
3 INTRAVENOUS SOLUTION, PIGGYBACK (ML) INTRAVENOUS ONCE
Status: COMPLETED | OUTPATIENT
Start: 2020-12-08 | End: 2020-12-08

## 2020-12-08 RX ORDER — ACETAMINOPHEN 650 MG
TABLET, EXTENDED RELEASE ORAL AS NEEDED
Status: DISCONTINUED | OUTPATIENT
Start: 2020-12-08 | End: 2020-12-08 | Stop reason: HOSPADM

## 2020-12-08 RX ORDER — NALOXONE HCL 0.4 MG/ML
0.2 VIAL (ML) INJECTION AS NEEDED
Status: DISCONTINUED | OUTPATIENT
Start: 2020-12-08 | End: 2020-12-08 | Stop reason: HOSPADM

## 2020-12-08 RX ORDER — ONDANSETRON 2 MG/ML
INJECTION INTRAMUSCULAR; INTRAVENOUS AS NEEDED
Status: DISCONTINUED | OUTPATIENT
Start: 2020-12-08 | End: 2020-12-08 | Stop reason: SURG

## 2020-12-08 RX ORDER — LIDOCAINE HYDROCHLORIDE 10 MG/ML
0.5 INJECTION, SOLUTION EPIDURAL; INFILTRATION; INTRACAUDAL; PERINEURAL ONCE AS NEEDED
Status: DISCONTINUED | OUTPATIENT
Start: 2020-12-08 | End: 2020-12-08 | Stop reason: HOSPADM

## 2020-12-08 RX ORDER — MINERAL OIL 471.99 G/472ML
OIL TOPICAL AS NEEDED
Status: DISCONTINUED | OUTPATIENT
Start: 2020-12-08 | End: 2020-12-08 | Stop reason: HOSPADM

## 2020-12-08 RX ORDER — ONDANSETRON 2 MG/ML
4 INJECTION INTRAMUSCULAR; INTRAVENOUS EVERY 6 HOURS PRN
Status: DISCONTINUED | OUTPATIENT
Start: 2020-12-08 | End: 2020-12-13 | Stop reason: HOSPADM

## 2020-12-08 RX ORDER — LABETALOL HYDROCHLORIDE 5 MG/ML
5 INJECTION, SOLUTION INTRAVENOUS
Status: DISCONTINUED | OUTPATIENT
Start: 2020-12-08 | End: 2020-12-08 | Stop reason: HOSPADM

## 2020-12-08 RX ORDER — CEFAZOLIN SODIUM IN 0.9 % NACL 3 G/100 ML
3 INTRAVENOUS SOLUTION, PIGGYBACK (ML) INTRAVENOUS EVERY 8 HOURS
Status: DISPENSED | OUTPATIENT
Start: 2020-12-08 | End: 2020-12-10

## 2020-12-08 RX ORDER — HYDROMORPHONE HCL 110MG/55ML
PATIENT CONTROLLED ANALGESIA SYRINGE INTRAVENOUS AS NEEDED
Status: DISCONTINUED | OUTPATIENT
Start: 2020-12-08 | End: 2020-12-08 | Stop reason: SURG

## 2020-12-08 RX ORDER — FENTANYL CITRATE 50 UG/ML
INJECTION, SOLUTION INTRAMUSCULAR; INTRAVENOUS AS NEEDED
Status: DISCONTINUED | OUTPATIENT
Start: 2020-12-08 | End: 2020-12-08 | Stop reason: SURG

## 2020-12-08 RX ORDER — FOLIC ACID 1 MG/1
1000 TABLET ORAL DAILY
Status: DISCONTINUED | OUTPATIENT
Start: 2020-12-08 | End: 2020-12-13 | Stop reason: HOSPADM

## 2020-12-08 RX ORDER — ENALAPRILAT 2.5 MG/2ML
1.25 INJECTION INTRAVENOUS ONCE AS NEEDED
Status: DISCONTINUED | OUTPATIENT
Start: 2020-12-08 | End: 2020-12-08 | Stop reason: HOSPADM

## 2020-12-08 RX ORDER — PROMETHAZINE HYDROCHLORIDE 25 MG/1
25 TABLET ORAL ONCE AS NEEDED
Status: DISCONTINUED | OUTPATIENT
Start: 2020-12-08 | End: 2020-12-08 | Stop reason: HOSPADM

## 2020-12-08 RX ORDER — WOUND DRESSING ADHESIVE - LIQUID
LIQUID MISCELLANEOUS AS NEEDED
Status: DISCONTINUED | OUTPATIENT
Start: 2020-12-08 | End: 2020-12-08 | Stop reason: HOSPADM

## 2020-12-08 RX ORDER — DIPHENHYDRAMINE HCL 25 MG
25 CAPSULE ORAL EVERY 6 HOURS PRN
Status: DISCONTINUED | OUTPATIENT
Start: 2020-12-08 | End: 2020-12-13 | Stop reason: HOSPADM

## 2020-12-08 RX ORDER — SODIUM CHLORIDE 0.9 % (FLUSH) 0.9 %
3-10 SYRINGE (ML) INJECTION AS NEEDED
Status: DISCONTINUED | OUTPATIENT
Start: 2020-12-08 | End: 2020-12-08 | Stop reason: HOSPADM

## 2020-12-08 RX ORDER — PROMETHAZINE HYDROCHLORIDE 25 MG/1
25 SUPPOSITORY RECTAL ONCE AS NEEDED
Status: DISCONTINUED | OUTPATIENT
Start: 2020-12-08 | End: 2020-12-08 | Stop reason: HOSPADM

## 2020-12-08 RX ORDER — FAMOTIDINE 10 MG/ML
20 INJECTION, SOLUTION INTRAVENOUS ONCE
Status: COMPLETED | OUTPATIENT
Start: 2020-12-08 | End: 2020-12-08

## 2020-12-08 RX ORDER — EPHEDRINE SULFATE 50 MG/ML
5 INJECTION, SOLUTION INTRAVENOUS ONCE AS NEEDED
Status: DISCONTINUED | OUTPATIENT
Start: 2020-12-08 | End: 2020-12-08 | Stop reason: HOSPADM

## 2020-12-08 RX ORDER — ROCURONIUM BROMIDE 10 MG/ML
INJECTION, SOLUTION INTRAVENOUS AS NEEDED
Status: DISCONTINUED | OUTPATIENT
Start: 2020-12-08 | End: 2020-12-08 | Stop reason: SURG

## 2020-12-08 RX ORDER — AMOXICILLIN 250 MG
2 CAPSULE ORAL NIGHTLY
Status: DISCONTINUED | OUTPATIENT
Start: 2020-12-08 | End: 2020-12-13 | Stop reason: HOSPADM

## 2020-12-08 RX ORDER — FENTANYL CITRATE 50 UG/ML
50 INJECTION, SOLUTION INTRAMUSCULAR; INTRAVENOUS
Status: DISCONTINUED | OUTPATIENT
Start: 2020-12-08 | End: 2020-12-08 | Stop reason: HOSPADM

## 2020-12-08 RX ORDER — ONDANSETRON 2 MG/ML
4 INJECTION INTRAMUSCULAR; INTRAVENOUS ONCE AS NEEDED
Status: COMPLETED | OUTPATIENT
Start: 2020-12-08 | End: 2020-12-08

## 2020-12-08 RX ORDER — OXYCODONE HYDROCHLORIDE 5 MG/1
5 TABLET ORAL EVERY 4 HOURS PRN
Status: DISCONTINUED | OUTPATIENT
Start: 2020-12-08 | End: 2020-12-13 | Stop reason: HOSPADM

## 2020-12-08 RX ORDER — HYDROMORPHONE HYDROCHLORIDE 1 MG/ML
0.5 INJECTION, SOLUTION INTRAMUSCULAR; INTRAVENOUS; SUBCUTANEOUS
Status: DISCONTINUED | OUTPATIENT
Start: 2020-12-08 | End: 2020-12-13 | Stop reason: HOSPADM

## 2020-12-08 RX ORDER — SODIUM CHLORIDE 0.9 % (FLUSH) 0.9 %
3 SYRINGE (ML) INJECTION EVERY 12 HOURS SCHEDULED
Status: DISCONTINUED | OUTPATIENT
Start: 2020-12-08 | End: 2020-12-08 | Stop reason: HOSPADM

## 2020-12-08 RX ORDER — DIPHENHYDRAMINE HCL 25 MG
25 CAPSULE ORAL
Status: DISCONTINUED | OUTPATIENT
Start: 2020-12-08 | End: 2020-12-08 | Stop reason: HOSPADM

## 2020-12-08 RX ORDER — ACETAMINOPHEN 650 MG/1
650 SUPPOSITORY RECTAL EVERY 4 HOURS PRN
Status: DISCONTINUED | OUTPATIENT
Start: 2020-12-08 | End: 2020-12-13 | Stop reason: HOSPADM

## 2020-12-08 RX ORDER — HYDROCODONE BITARTRATE AND ACETAMINOPHEN 7.5; 325 MG/1; MG/1
1 TABLET ORAL ONCE AS NEEDED
Status: DISCONTINUED | OUTPATIENT
Start: 2020-12-08 | End: 2020-12-08 | Stop reason: HOSPADM

## 2020-12-08 RX ORDER — MIDAZOLAM HYDROCHLORIDE 1 MG/ML
1 INJECTION INTRAMUSCULAR; INTRAVENOUS
Status: DISCONTINUED | OUTPATIENT
Start: 2020-12-08 | End: 2020-12-08 | Stop reason: HOSPADM

## 2020-12-08 RX ORDER — CEFAZOLIN SODIUM 2 G/100ML
2 INJECTION, SOLUTION INTRAVENOUS ONCE
Status: DISCONTINUED | OUTPATIENT
Start: 2020-12-08 | End: 2020-12-08

## 2020-12-08 RX ORDER — ERYTHROMYCIN 5 MG/G
OINTMENT OPHTHALMIC EVERY 12 HOURS
Status: DISCONTINUED | OUTPATIENT
Start: 2020-12-08 | End: 2020-12-13 | Stop reason: HOSPADM

## 2020-12-08 RX ADMIN — ONDANSETRON 4 MG: 2 INJECTION INTRAMUSCULAR; INTRAVENOUS at 12:19

## 2020-12-08 RX ADMIN — HYDROMORPHONE HYDROCHLORIDE 0.5 MG: 2 INJECTION, SOLUTION INTRAMUSCULAR; INTRAVENOUS; SUBCUTANEOUS at 07:54

## 2020-12-08 RX ADMIN — OXYCODONE HYDROCHLORIDE 5 MG: 5 TABLET ORAL at 14:57

## 2020-12-08 RX ADMIN — PHENYLEPHRINE HYDROCHLORIDE 100 MCG: 10 INJECTION INTRAVENOUS at 07:45

## 2020-12-08 RX ADMIN — LIDOCAINE HYDROCHLORIDE 100 MG: 20 INJECTION, SOLUTION INFILTRATION; PERINEURAL at 07:07

## 2020-12-08 RX ADMIN — FENTANYL CITRATE 100 MCG: 50 INJECTION INTRAMUSCULAR; INTRAVENOUS at 07:07

## 2020-12-08 RX ADMIN — DOCUSATE SODIUM 50MG AND SENNOSIDES 8.6MG 2 TABLET: 8.6; 5 TABLET, FILM COATED ORAL at 21:10

## 2020-12-08 RX ADMIN — FOLIC ACID 1000 MCG: 1 TABLET ORAL at 21:03

## 2020-12-08 RX ADMIN — FENTANYL CITRATE 50 MCG: 50 INJECTION INTRAMUSCULAR; INTRAVENOUS at 07:40

## 2020-12-08 RX ADMIN — GLYCERIN, HYPROMELLOSE, POLYETHYLENE GLYCOL 1 DROP: .2; .2; 1 LIQUID OPHTHALMIC at 16:40

## 2020-12-08 RX ADMIN — FENTANYL CITRATE 25 MCG: 50 INJECTION INTRAMUSCULAR; INTRAVENOUS at 11:07

## 2020-12-08 RX ADMIN — PROPOFOL 250 MG: 10 INJECTION, EMULSION INTRAVENOUS at 07:07

## 2020-12-08 RX ADMIN — ROCURONIUM BROMIDE 30 MG: 10 INJECTION INTRAVENOUS at 07:38

## 2020-12-08 RX ADMIN — CEFAZOLIN 3 G: 10 INJECTION, POWDER, FOR SOLUTION INTRAVENOUS at 07:14

## 2020-12-08 RX ADMIN — SODIUM CHLORIDE, PRESERVATIVE FREE 3 ML: 5 INJECTION INTRAVENOUS at 21:08

## 2020-12-08 RX ADMIN — CEFAZOLIN 3 G: 10 INJECTION, POWDER, FOR SOLUTION INTRAVENOUS at 11:12

## 2020-12-08 RX ADMIN — AMLODIPINE BESYLATE 5 MG: 5 TABLET ORAL at 21:03

## 2020-12-08 RX ADMIN — LABETALOL HYDROCHLORIDE 5 MG: 5 INJECTION, SOLUTION INTRAVENOUS at 08:04

## 2020-12-08 RX ADMIN — LABETALOL HYDROCHLORIDE 5 MG: 5 INJECTION, SOLUTION INTRAVENOUS at 08:03

## 2020-12-08 RX ADMIN — SODIUM CHLORIDE, POTASSIUM CHLORIDE, SODIUM LACTATE AND CALCIUM CHLORIDE 9 ML/HR: 600; 310; 30; 20 INJECTION, SOLUTION INTRAVENOUS at 05:59

## 2020-12-08 RX ADMIN — ROSUVASTATIN CALCIUM 20 MG: 20 TABLET, FILM COATED ORAL at 21:03

## 2020-12-08 RX ADMIN — HYDROMORPHONE HYDROCHLORIDE 0.5 MG: 1 INJECTION, SOLUTION INTRAMUSCULAR; INTRAVENOUS; SUBCUTANEOUS at 12:25

## 2020-12-08 RX ADMIN — FENTANYL CITRATE 50 MCG: 50 INJECTION, SOLUTION INTRAMUSCULAR; INTRAVENOUS at 12:39

## 2020-12-08 RX ADMIN — OXYCODONE HYDROCHLORIDE AND ACETAMINOPHEN 1 TABLET: 7.5; 325 TABLET ORAL at 13:05

## 2020-12-08 RX ADMIN — FAMOTIDINE 20 MG: 10 INJECTION INTRAVENOUS at 06:30

## 2020-12-08 RX ADMIN — FENTANYL CITRATE 25 MCG: 50 INJECTION INTRAMUSCULAR; INTRAVENOUS at 08:45

## 2020-12-08 RX ADMIN — MIDAZOLAM 1 MG: 1 INJECTION INTRAMUSCULAR; INTRAVENOUS at 06:56

## 2020-12-08 RX ADMIN — PHENYLEPHRINE HYDROCHLORIDE 100 MCG: 10 INJECTION INTRAVENOUS at 07:28

## 2020-12-08 RX ADMIN — FENTANYL CITRATE 25 MCG: 50 INJECTION INTRAMUSCULAR; INTRAVENOUS at 11:00

## 2020-12-08 RX ADMIN — OXYCODONE HYDROCHLORIDE 5 MG: 5 TABLET ORAL at 21:07

## 2020-12-08 RX ADMIN — ROCURONIUM BROMIDE 20 MG: 10 INJECTION INTRAVENOUS at 09:07

## 2020-12-08 RX ADMIN — METFORMIN HYDROCHLORIDE 1000 MG: 1000 TABLET ORAL at 21:03

## 2020-12-08 RX ADMIN — CEFAZOLIN 3 G: 10 INJECTION, POWDER, FOR SOLUTION INTRAVENOUS at 18:47

## 2020-12-08 RX ADMIN — HYDROMORPHONE HYDROCHLORIDE 0.5 MG: 2 INJECTION, SOLUTION INTRAMUSCULAR; INTRAVENOUS; SUBCUTANEOUS at 08:00

## 2020-12-08 RX ADMIN — FENTANYL CITRATE 25 MCG: 50 INJECTION INTRAMUSCULAR; INTRAVENOUS at 11:54

## 2020-12-08 RX ADMIN — SUGAMMADEX 200 MG: 100 INJECTION, SOLUTION INTRAVENOUS at 11:51

## 2020-12-08 RX ADMIN — SODIUM CHLORIDE, POTASSIUM CHLORIDE, SODIUM LACTATE AND CALCIUM CHLORIDE: 600; 310; 30; 20 INJECTION, SOLUTION INTRAVENOUS at 08:35

## 2020-12-08 RX ADMIN — ROCURONIUM BROMIDE 20 MG: 10 INJECTION INTRAVENOUS at 08:13

## 2020-12-08 RX ADMIN — FENTANYL CITRATE 50 MCG: 50 INJECTION, SOLUTION INTRAMUSCULAR; INTRAVENOUS at 12:19

## 2020-12-08 RX ADMIN — ROCURONIUM BROMIDE 10 MG: 10 INJECTION INTRAVENOUS at 10:45

## 2020-12-08 RX ADMIN — ONDANSETRON HYDROCHLORIDE 4 MG: 2 SOLUTION INTRAMUSCULAR; INTRAVENOUS at 11:05

## 2020-12-08 RX ADMIN — ERYTHROMYCIN: 5 OINTMENT OPHTHALMIC at 22:03

## 2020-12-08 RX ADMIN — ROCURONIUM BROMIDE 10 MG: 10 INJECTION INTRAVENOUS at 10:06

## 2020-12-08 RX ADMIN — GLYCERIN, HYPROMELLOSE, POLYETHYLENE GLYCOL 1 DROP: .2; .2; 1 LIQUID OPHTHALMIC at 18:47

## 2020-12-08 RX ADMIN — HYDROMORPHONE HYDROCHLORIDE 0.5 MG: 1 INJECTION, SOLUTION INTRAMUSCULAR; INTRAVENOUS; SUBCUTANEOUS at 18:44

## 2020-12-08 RX ADMIN — HYDROMORPHONE HYDROCHLORIDE 0.5 MG: 1 INJECTION, SOLUTION INTRAMUSCULAR; INTRAVENOUS; SUBCUTANEOUS at 13:53

## 2020-12-08 RX ADMIN — FENTANYL CITRATE 25 MCG: 50 INJECTION INTRAMUSCULAR; INTRAVENOUS at 11:43

## 2020-12-08 RX ADMIN — HYDROMORPHONE HYDROCHLORIDE 0.5 MG: 1 INJECTION, SOLUTION INTRAMUSCULAR; INTRAVENOUS; SUBCUTANEOUS at 13:01

## 2020-12-08 RX ADMIN — ROCURONIUM BROMIDE 50 MG: 10 INJECTION INTRAVENOUS at 07:07

## 2020-12-08 RX ADMIN — FENTANYL CITRATE 25 MCG: 50 INJECTION INTRAMUSCULAR; INTRAVENOUS at 11:11

## 2020-12-08 RX ADMIN — ESCITALOPRAM 20 MG: 20 TABLET, FILM COATED ORAL at 21:03

## 2020-12-08 NOTE — ANESTHESIA PREPROCEDURE EVALUATION
Anesthesia Evaluation     Patient summary reviewed and Nursing notes reviewed   history of anesthetic complications (Chills pt relates to anesthesia):  NPO Solid Status: > 6 hours             Airway   Mallampati: III  TM distance: >3 FB  Neck ROM: full  Large neck circumference and Possible difficult intubation  Dental          Pulmonary - normal exam   (+) a smoker Former, sleep apnea,   Cardiovascular - normal exam    ECG reviewed    (+) hypertension,   (-) angina      Neuro/Psych  (+) psychiatric history Anxiety,     GI/Hepatic/Renal/Endo    (+) morbid obesity,  diabetes mellitus,     Musculoskeletal     Abdominal    Substance History      OB/GYN          Other                        Anesthesia Plan    ASA 3     general   (Discussed possibility of incidental  dental injury and loss of loose tooth  )    Anesthetic plan, all risks, benefits, and alternatives have been provided, discussed and informed consent has been obtained with: patient.

## 2020-12-08 NOTE — OP NOTE
OPERATIVE NOTE:     Date:  12/8/2020    Attending Physician: Dr. Terry Conteh MD  Assistant(s): None      Prior to the beginning of the procedure the team paused to verify the patient's identity, as well as the procedure to be performed and the correct side/site. All equipment required was ready and available. The patient was positioned appropriately.      Preoperative Diagnosis:   1. Acquired Buried Penis  2. Lichen Sclerosus  3. Morbid obesity     Postoperative Diagnosis: Same     Procedures Performed:   1. Release of Buried Penis  2. Cystoscopy  3. Scrotoplasty  4. Split Thickness Skin graft, 81 cm^2  5. Application of wound vac     Anesthesia: General with ET tube     Indication: Ray London is a 45 year old man, morbidly obese, who presented in clinic with a buried penis and skin changes consistent with lichen sclerosus.  He also has a history of problematic Psoriasis. This greatly affects his quality of life, ability to protrude his penis or retract the foreskin, and thus his ability to practice good hygiene, and ability to achieve erection satisfactory of sexual activity. He desires repair.  He understands the risks/benefits/expectations associated with the procedure, including bleeding, infection, damage to adjacent tissues, graft failure, recurrence of lichen sclerosus, and that he may require future procedures to obtain the outcome he desires. We also discussed that he would need to remain as an inpatient with strict bed rest for 5 days while the skin graft is healing and minimize walking for one month after his procedure to promote wound healing.      Findings:   - Severe Lichen Sclerosus affecting penile skin, scrotal skin, and inferior mons pubis  - Glans skin healthy  - Urethra widely patent, some proximal bulbar narrowing, accommodated 16 Fr scope easily  - Bladder lumen normal, ureteral orifices in orthotopic position  - Left anterior thigh skin graft harvest site  - Wound vac on 75 mmHg  negative pressure     Procedure in detail:  The patient was consented for surgery and taken to the operative theater. SCD's were placed on the patient before the induction of anesthesia. The patient was placed in a supine position and underwent general anesthesia with endotracheal tube. He was prepped and draped in a sterile fashion in the supine position, including the left thigh for access as the donor skin graft site. He did not have a preference for which thigh was used. A surgical pause was initiated which identified appropriate patient and procedure and perioperative antibiotics (IV Ancef) were given.      He had a severely scarred and phimotic foreskin that was entirely covering his penis. We were unable to place a Marcano catheter since we could not visualize the urethral meatus. The phimotic foreskin was grasped and a dorsal slit was performed to the point that the glans could be seen, and a 3-0 Prolene was placed as a glans stitch. Using this to place the penis on stretch, we noted that the proximal shaft skin had been affected heavily by scarring / Lichen Sclerosus and was part of the skin opened by the dorsal slit.  We made a circumferential incision 5mm proximal to the corona of the glans and excised the shaft skin so as not to leave it with lymphedema or lack of blood supply. The shaft skin was degloved past the penoscrotal junction, leaving the dartos fascia attached to the penis as a healthy graft bed for grafting later.  Hemostasis was obtained with Bipolar Electrocautery and care was taken to protect the neurovascular bundle.       The 16 Fr flexible cystoscope was advanced down the urethra and there were no signs of lichen sclerosus but there was narrowing at the proximal bulbar urethra. The bladder wall was smooth and the ureteral orifices were orthotopic in position. There were no stones or lesions. The scope was withdrawn and a 16 Fr urethral Marcano catheter was placed, drained, and capped.      Due to the patients morbid obesity and distribution of suprapubic adipose tissue, we marked the mons pubis anterior to the penis for reduction to facilitate exposure of the base of the penis. The skin was resected and electrocautery was used for hemostasis. The Colles' fascia overlying the midline pubic symphysis was incised with cautery and a series of 4 separate interrupted 3-0 PDS Sutures were placed in the tough overlying fascia over the penis and then fixed to the surrounding mons/scrotal skin. Once satisfied with the positioning, these were tied, bringing the mons skin down to the level of the base of the penis. Next, the lateral skin edges and ventral skin edge of the scrotum, were tacked down to the base of the penis using interrupted 3-0 Vicryl. The penis was measured in circumference, 9cm, and length, 9cm, for skin graft harvest.       Attention was turned to the left thigh where the skin graft would be harvested. The skin was marked for a total of 81cm^2. Using the [employer] Dermatome set at thickness = 18 thousandths of an inch, we harvested a 9cm by 9cm split thickness skin graft. A lidocaine/epinephrine-soaked Telfa was placed over the donor site until the end of the procedure. The skin graft was cleansed with saline and then spread over a towel and and basin. The graft was fenestrated using a #15 scalpel to allow for drainage after quilting into place.     The graft was secured to the base of the penis and the surrounding skin with interrupted 3-0 Polysorb suture, careful not to disrupt the neurovascular bundle or the urethra. The penis was cleansed with saline and dried, then we sprayed tisseal two-component fibrin sealant on the shaft of the penis penis as well as the underside of the graft before placing it over the dorsal side of the penis. After about 60 seconds of stabilization, we placed interrupted 5-0 Polysorb tacking sutures along the corona and the ventral aspect of the penis, and at the  margin of preputial skin proximal to the glans. The excess graft material over the glans was excised to conform to the edge of the preputial collar.  The graft was then quilted with addition 5-0 Polysorb to facilitate good apposition of the graft and the underlying dartos layer. This completed the skin graft.     We proceeded to make our dressing for both the grafted penis as well as the donor site. The Telfa was lifted off the left thigh donor site and it was inspected for good hemostasis. The area was dried and Mastisol was applied to the skin surrounding the donor site to facilitate food adhesion of the dressing. A large tegaderm was placed over the donor site. We then dried the skin surrounding the penis and applied Mastisol to assisted in adhesion of the wound vac dressing. Small strips of adhesive plastic were placed circumferentially on the skin surrounding the base of the penis. The penis was coated in a film of bacitracin ointment followed by two layers of Adaptic non-adhering gauze. Next, rings of black wound-vac sponge were placed from the base of the penis to the corona and secured in place with staples from sponge to sponge. Black sponge was placed over the glans, as well. Two tongue depressors were placed on the lateral sides of the penis to serve as a scaffold for the sponge during negative pressure, preventing inward compression in the longitudinal axis of the penis. The glans stitch was removed. The penis was then covered entirely with adhesive plastic and the track-pad was placed dorsally. The vacuum was applied at negative 75 mmHg and there were no leaks. The urethral Marcano catheter was connected to gravity drainage.   .  The patient was awakened, extubated, and transferred to the post-anesthesia care unit in good condition.      Estimated Blood Loss: 100 mL     Fluids: See anesthesia record     Specimens: Penile shaft skin     Complications: None     Drains:   1. 16 Fr urethral Marcano catheter  2.  Wound vac to continuous suction     Plan:   - Admit to Hospital, Urology service  - Plan inpatient stay of 5-6 days  - Bedrest, SCDs  - Diab diet   - Wound vac on continuous 75 mmhg negative pressure  - Hospitalist consult for co-management of Diabetes and HTN  - Call Dr. Terry Conteh for any Wound vac issues     I was present and scrubbed for the entire procedure.     Terry Conteh MD  [facility]  General & Reconstructive Urology  Office: [phone]  Fax: [phone]

## 2020-12-08 NOTE — PLAN OF CARE
Goal Outcome Evaluation:  Plan of Care Reviewed With: patient  Progress: no change  Outcome Summary: pt arrived from PACU s/p buried penis repair with skin grafting. wound vac in place 75mmhg. complaints of eye burning, artificial tears ordered. pt remains strict bed rest at this time. awaiting hospitalist consult for med management. VSS will continue to monitor.

## 2020-12-08 NOTE — ANESTHESIA POSTPROCEDURE EVALUATION
"Patient: Ray London    Procedure Summary     Date: 12/08/20 Room / Location: [facility] OR [address] [place] [address] MAIN OR    Anesthesia Start: 0701 Anesthesia Stop: 1210    Procedure: BURIED PENIS REPAIR, SPLIT THICKNESS SKIN GRAFT, CYSTOSCOPY, SCROTOPLASTY, APPLICATION OF NEGATIVE PRESSURE DRESSING (N/A Penis) Diagnosis:     Surgeon: Terry Conteh MD Provider: Kartik Galeano MD    Anesthesia Type: general ASA Status: 3          Anesthesia Type: general    Vitals  Vitals Value Taken Time   /91 12/08/20 1255   Temp 36.7 °C (98 °F) 12/08/20 1207   Pulse 86 12/08/20 1308   Resp 16 12/08/20 1255   SpO2 95 % 12/08/20 1308   Vitals shown include unvalidated device data.        Post Anesthesia Care and Evaluation    Patient location during evaluation: PACU  Patient participation: complete - patient participated  Level of consciousness: awake and alert  Pain management: adequate  Airway patency: patent  Anesthetic complications: No anesthetic complications    Cardiovascular status: acceptable  Respiratory status: acceptable  Hydration status: acceptable    Comments: /91   Pulse 87   Temp 36.7 °C (98 °F) (Oral)   Resp 16   Ht 182.9 cm (72\")   Wt (!) 174 kg (384 lb 3 oz)   SpO2 98%   BMI 52.11 kg/m²       "

## 2020-12-08 NOTE — ANESTHESIA PROCEDURE NOTES
Airway  Urgency: elective    Date/Time: 12/8/2020 7:10 AM  Airway not difficult    General Information and Staff    Patient location during procedure: OR  Anesthesiologist: Kartik Galeano MD  CRNA: Nedra Leal CRNA    Indications and Patient Condition  Indications for airway management: airway protection    Preoxygenated: yes  MILS maintained throughout  Mask difficulty assessment: 1 - vent by mask    Final Airway Details  Final airway type: endotracheal airway      Successful airway: ETT  Cuffed: yes   Successful intubation technique: direct laryngoscopy  Endotracheal tube insertion site: oral  Blade: Win  Blade size: 2  ETT size (mm): 7.5  Cormack-Lehane Classification: grade I - full view of glottis  Placement verified by: chest auscultation, bronchoscopy and capnometry   Measured from: teeth  ETT/EBT  to teeth (cm): 22  Number of attempts at approach: 1  Assessment: lips, teeth, and gum same as pre-op and atraumatic intubation

## 2020-12-08 NOTE — H&P
[facility] History & Physical      Patient Identification:  NAME:  Ray London  Age:  45 y.o.   Sex:  male   :  1975   MRN:  [id number]       Chief complaint: Planned Procedure    History of present illness:  Ray London is a 45 year old man with history of acquired buried penis.  No changes since last seen in clinic.      Past medical history:  Past Medical History:   Diagnosis Date   • Anxiety    • Depression    • Diabetes mellitus (CMS/HCC)    • ED (erectile dysfunction)    • Erectile dysfunction    • Folic acid deficiency    • GERD (gastroesophageal reflux disease)    • History of cellulitis     RT LEG KNEE TO FOOT   • Hyperlipidemia    • Hypertension    • Obesity    • Psoriasis    • Sleep apnea     NO CPAP USE   • Visual impairment        Past surgical history:  Past Surgical History:   Procedure Laterality Date   • ADENOIDECTOMY     • TONSILLECTOMY         Allergies:  Patient has no known allergies.    Home medications:  Medications Prior to Admission   Medication Sig Dispense Refill Last Dose   • amLODIPine (NORVASC) 5 MG tablet Take 1 tablet by mouth Daily. For BP (Patient taking differently: Take 5 mg by mouth Every Night. For BP) 30 tablet 5 2020 at 2330   • clobetasol (TEMOVATE) 0.05 % ointment Apply 1 application topically to the appropriate area as directed As Needed.   Past Month at Unknown time   • [employer] D3 50 MCG ( UT) capsule TAKE ONE CAPSULE BY MOUTH EVERY DAY (Patient taking differently: Take 2,000 Units by mouth Every Night.) 90 capsule 3 Past Month at Unknown time   • Empagliflozin (Jardiance) 25 MG tablet Take 25 mg by mouth Daily. For DMII (Patient taking differently: Take 25 mg by mouth Every Night. For DMII) 30 tablet 5 2020 at 2330   • escitalopram (LEXAPRO) 20 MG tablet ONE TAB DAILY FOR STRESS (Patient taking differently: Take 20 mg by mouth Every Night. ONE TAB DAILY FOR STRESS) 90 tablet 3 2020 at 2330   • folic acid (FOLVITE) 1 MG tablet Take  1 tablet by mouth Daily. 90 tablet 3 12/7/2020 at 2330   • metFORMIN (Glucophage) 500 MG tablet Take 2 tablets by mouth 2 (Two) Times a Day With Meals. For DMII 360 tablet 1 12/7/2020 at 2330   • omeprazole (priLOSEC) 20 MG capsule TAKE 1 CAPSULE BY MOUTH DAILY. FOR GERD (Patient taking differently: Take 20 mg by mouth Every Night. For GERD) 90 capsule 3 12/7/2020 at 2330   • rosuvastatin (Crestor) 20 MG tablet Take 1 tablet by mouth Daily. For cholesterol--new dose (Patient taking differently: Take 20 mg by mouth Every Night. For cholesterol--new dose) 30 tablet 11 12/7/2020 at 2330   • valsartan-hydrochlorothiazide (DIOVAN-HCT) 160-12.5 MG per tablet Take 1 tablet by mouth Daily. for blood pressure. (Patient taking differently: Take 1 tablet by mouth Every Night. for blood pressure.) 30 tablet 5 12/7/2020 at 2330   • Dulaglutide (Trulicity) 1.5 MG/0.5ML solution pen-injector Inject 1.5 mg under the skin into the appropriate area as directed 1 (One) Time Per Week. Inject 1.5mg SC once weekly for DMII (Patient taking differently: Inject 1.5 mg under the skin into the appropriate area as directed 1 (One) Time Per Week. Inject 1.5mg SC once weekly for DMII ON WEDNESDAY) 4 pen 5 12/2/2020   • STELARA 90 MG/ML solution prefilled syringe Injection Inject  under the skin into the appropriate area as directed Every 3 (Three) Months. DUE IN JANUARY   10/2020        Hospital medications:  ceFAZolin, 3 g, Intravenous, Once  sodium chloride, 3 mL, Intravenous, Q12H      lactated ringers, 9 mL/hr, Last Rate: 9 mL/hr (12/08/20 0559)      fentanyl  •  lidocaine PF 1%  •  midazolam  •  sodium chloride    Family history:  Family History   Problem Relation Age of Onset   • Hypertension Mother    • Anxiety disorder Mother    • Diabetes Maternal Grandmother    • Cancer Maternal Grandmother    • Alcohol abuse Father    • Cancer Maternal Aunt    • Alcohol abuse Paternal Grandfather    • Alcohol abuse Maternal Uncle    • Malig  Hyperthermia Neg Hx        Social history:  Social History     Tobacco Use   • Smoking status: Former Smoker     Packs/day: 0.50     Years: 12.00     Pack years: 6.00     Types: Cigarettes     Start date:      Quit date: 10/11/2011     Years since quittin.1   • Smokeless tobacco: Never Used   Substance Use Topics   • Alcohol use: No   • Drug use: No       REVIEW OF SYSTEMS:  Constitutional - Negative for fevers/chills  Eyes/Ears/Nose/Mouth/Throat - Negative for changes in vision  Cardiovascular - Negative for chest pain, dysrhythmia  Respiratory - Negative for dyspnea  Gastrointestinal - Negative for nausea or vomiting  Genitourinary - Negative for dysuria  Hematologic/Lymphatic - Negative for bruising  Skin - Negative for erythema  Endocrine - history of diabetes    Objective:  TMax 24 hours:   Temp (24hrs), Av.4 °F (36.9 °C), Min:98.4 °F (36.9 °C), Max:98.4 °F (36.9 °C)      Vitals Ranges:   Temp:  [98.4 °F (36.9 °C)] 98.4 °F (36.9 °C)  Heart Rate:  [86-88] 86  Resp:  [18-20] 18  BP: (150)/(92) 150/92    Intake/Output Last 3 shifts:  No intake/output data recorded.     Physical Exam:    General Appearance:    Alert, cooperative, NAD   HEENT:    No trauma, pupils reactive, hearing intact   Back:     No CVA tenderness   Lungs:     Respirations unlabored, no wheezing    Heart:    RRR, intact peripheral pulses   Abdomen:     Soft, NDNT, no masses, no guarding   :    Testes descended bilaterally, no nodules.  Penis normal.      No scrotal or penile rashes noted   Extremities:   No edema, no deformity   Lymphatic:   No neck or groin LAD   Skin:   No bleeding, bruising or rashes   Neuro/Psych:   Orientation intact, mood/affect pleasant, no focal findings       Results review:   I reviewed the patient's new clinical results.    Data review:  Lab Results (last 24 hours)     Procedure Component Value Units Date/Time    POC Glucose Once [[id number]]  (Abnormal) Collected: 20 0545    Specimen: Blood  Updated: 12/08/20 0547     Glucose 180 mg/dL            Imaging:  Imaging Results (Last 24 Hours)     ** No results found for the last 24 hours. **             Assessment:       * No active hospital problems. *    Acquired buried penis    We discussed the benefits/risks/expectations and he desires surgical intervention. Risks include bleeding, infection, urinary tract infection/sepsis, damage to adjacent tissues including the penis/testicles/scrotum, chronic pain, numbness, erectile dysfunction, decreased sensation, loss of skin, additional procedures, stroke, heart attack, death.    Plan:     Proceed with Buried penis repair    Terry Conteh MD  12/08/20  07:00 EST

## 2020-12-09 PROBLEM — E66.01 MORBID OBESITY (HCC): Status: ACTIVE | Noted: 2020-12-09

## 2020-12-09 PROBLEM — E11.65 TYPE 2 DIABETES MELLITUS WITH HYPERGLYCEMIA (HCC): Status: ACTIVE | Noted: 2020-12-09

## 2020-12-09 PROBLEM — I10 ESSENTIAL HYPERTENSION, BENIGN: Status: ACTIVE | Noted: 2020-12-09

## 2020-12-09 LAB
ANION GAP SERPL CALCULATED.3IONS-SCNC: 10.1 MMOL/L (ref 5–15)
BASOPHILS # BLD AUTO: 0.11 10*3/MM3 (ref 0–0.2)
BASOPHILS NFR BLD AUTO: 0.9 % (ref 0–1.5)
BUN SERPL-MCNC: 16 MG/DL (ref 6–20)
BUN/CREAT SERPL: 15.7 (ref 7–25)
CALCIUM SPEC-SCNC: 8.4 MG/DL (ref 8.6–10.5)
CHLORIDE SERPL-SCNC: 98 MMOL/L (ref 98–107)
CO2 SERPL-SCNC: 24.9 MMOL/L (ref 22–29)
CREAT SERPL-MCNC: 1.02 MG/DL (ref 0.76–1.27)
DEPRECATED RDW RBC AUTO: 39.7 FL (ref 37–54)
EOSINOPHIL # BLD AUTO: 0.33 10*3/MM3 (ref 0–0.4)
EOSINOPHIL NFR BLD AUTO: 2.8 % (ref 0.3–6.2)
ERYTHROCYTE [DISTWIDTH] IN BLOOD BY AUTOMATED COUNT: 12.8 % (ref 12.3–15.4)
GFR SERPL CREATININE-BSD FRML MDRD: 79 ML/MIN/1.73
GLUCOSE BLDC GLUCOMTR-MCNC: 167 MG/DL (ref 70–130)
GLUCOSE BLDC GLUCOMTR-MCNC: 179 MG/DL (ref 70–130)
GLUCOSE BLDC GLUCOMTR-MCNC: 200 MG/DL (ref 70–130)
GLUCOSE BLDC GLUCOMTR-MCNC: 217 MG/DL (ref 70–130)
GLUCOSE SERPL-MCNC: 174 MG/DL (ref 65–99)
HCT VFR BLD AUTO: 45 % (ref 37.5–51)
HGB BLD-MCNC: 15 G/DL (ref 13–17.7)
IMM GRANULOCYTES # BLD AUTO: 0.08 10*3/MM3 (ref 0–0.05)
IMM GRANULOCYTES NFR BLD AUTO: 0.7 % (ref 0–0.5)
LAB AP CASE REPORT: NORMAL
LYMPHOCYTES # BLD AUTO: 3.01 10*3/MM3 (ref 0.7–3.1)
LYMPHOCYTES NFR BLD AUTO: 25.4 % (ref 19.6–45.3)
MAGNESIUM SERPL-MCNC: 2.1 MG/DL (ref 1.6–2.6)
MCH RBC QN AUTO: 28.5 PG (ref 26.6–33)
MCHC RBC AUTO-ENTMCNC: 33.3 G/DL (ref 31.5–35.7)
MCV RBC AUTO: 85.6 FL (ref 79–97)
MONOCYTES # BLD AUTO: 0.98 10*3/MM3 (ref 0.1–0.9)
MONOCYTES NFR BLD AUTO: 8.3 % (ref 5–12)
NEUTROPHILS NFR BLD AUTO: 61.9 % (ref 42.7–76)
NEUTROPHILS NFR BLD AUTO: 7.36 10*3/MM3 (ref 1.7–7)
NRBC BLD AUTO-RTO: 0 /100 WBC (ref 0–0.2)
PATH REPORT.FINAL DX SPEC: NORMAL
PATH REPORT.GROSS SPEC: NORMAL
PLATELET # BLD AUTO: 205 10*3/MM3 (ref 140–450)
PMV BLD AUTO: 9.6 FL (ref 6–12)
POTASSIUM SERPL-SCNC: 4.1 MMOL/L (ref 3.5–5.2)
RBC # BLD AUTO: 5.26 10*6/MM3 (ref 4.14–5.8)
SODIUM SERPL-SCNC: 133 MMOL/L (ref 136–145)
WBC # BLD AUTO: 11.87 10*3/MM3 (ref 3.4–10.8)

## 2020-12-09 PROCEDURE — G0008 ADMIN INFLUENZA VIRUS VAC: HCPCS | Performed by: UROLOGY

## 2020-12-09 PROCEDURE — 82962 GLUCOSE BLOOD TEST: CPT

## 2020-12-09 PROCEDURE — 80048 BASIC METABOLIC PNL TOTAL CA: CPT | Performed by: UROLOGY

## 2020-12-09 PROCEDURE — 85025 COMPLETE CBC W/AUTO DIFF WBC: CPT | Performed by: UROLOGY

## 2020-12-09 PROCEDURE — 25010000002 INFLUENZA VAC SPLIT QUAD 0.5 ML SUSPENSION PREFILLED SYRINGE: Performed by: UROLOGY

## 2020-12-09 PROCEDURE — 83735 ASSAY OF MAGNESIUM: CPT | Performed by: INTERNAL MEDICINE

## 2020-12-09 PROCEDURE — 25010000002 CEFAZOLIN PER 500 MG: Performed by: UROLOGY

## 2020-12-09 PROCEDURE — 25010000002 HYDROMORPHONE PER 4 MG: Performed by: UROLOGY

## 2020-12-09 PROCEDURE — 63710000001 INSULIN LISPRO (HUMAN) PER 5 UNITS: Performed by: INTERNAL MEDICINE

## 2020-12-09 PROCEDURE — 90686 IIV4 VACC NO PRSV 0.5 ML IM: CPT | Performed by: UROLOGY

## 2020-12-09 PROCEDURE — 25010000002 ENOXAPARIN PER 10 MG: Performed by: UROLOGY

## 2020-12-09 PROCEDURE — 63710000001 INSULIN GLARGINE PER 5 UNITS: Performed by: INTERNAL MEDICINE

## 2020-12-09 RX ORDER — KETOROLAC TROMETHAMINE 5 MG/ML
1 SOLUTION OPHTHALMIC 3 TIMES DAILY PRN
Status: DISCONTINUED | OUTPATIENT
Start: 2020-12-09 | End: 2020-12-13 | Stop reason: HOSPADM

## 2020-12-09 RX ORDER — KETOROLAC TROMETHAMINE 5 MG/ML
1 SOLUTION OPHTHALMIC 4 TIMES DAILY PRN
Status: DISCONTINUED | OUTPATIENT
Start: 2020-12-09 | End: 2020-12-09

## 2020-12-09 RX ORDER — PANTOPRAZOLE SODIUM 40 MG/1
40 TABLET, DELAYED RELEASE ORAL
Status: DISCONTINUED | OUTPATIENT
Start: 2020-12-09 | End: 2020-12-13 | Stop reason: HOSPADM

## 2020-12-09 RX ORDER — PROPARACAINE HYDROCHLORIDE 5 MG/ML
2 SOLUTION/ DROPS OPHTHALMIC ONCE
Status: COMPLETED | OUTPATIENT
Start: 2020-12-09 | End: 2020-12-09

## 2020-12-09 RX ORDER — HYDROCHLOROTHIAZIDE 12.5 MG/1
12.5 CAPSULE, GELATIN COATED ORAL DAILY
Status: DISCONTINUED | OUTPATIENT
Start: 2020-12-09 | End: 2020-12-13 | Stop reason: HOSPADM

## 2020-12-09 RX ORDER — INSULIN GLARGINE 100 [IU]/ML
5 INJECTION, SOLUTION SUBCUTANEOUS EVERY MORNING
Status: DISCONTINUED | OUTPATIENT
Start: 2020-12-09 | End: 2020-12-12

## 2020-12-09 RX ORDER — VALSARTAN 160 MG/1
160 TABLET ORAL
Status: DISCONTINUED | OUTPATIENT
Start: 2020-12-09 | End: 2020-12-13 | Stop reason: HOSPADM

## 2020-12-09 RX ORDER — TOBRAMYCIN AND DEXAMETHASONE 3; 1 MG/ML; MG/ML
1 SUSPENSION/ DROPS OPHTHALMIC
Status: DISCONTINUED | OUTPATIENT
Start: 2020-12-09 | End: 2020-12-13 | Stop reason: HOSPADM

## 2020-12-09 RX ADMIN — TOBRAMYCIN AND DEXAMETHASONE 1 DROP: 3; 1 SUSPENSION/ DROPS OPHTHALMIC at 20:04

## 2020-12-09 RX ADMIN — TOBRAMYCIN AND DEXAMETHASONE 1 DROP: 3; 1 SUSPENSION/ DROPS OPHTHALMIC at 17:40

## 2020-12-09 RX ADMIN — INSULIN LISPRO 2 UNITS: 100 INJECTION, SOLUTION INTRAVENOUS; SUBCUTANEOUS at 17:40

## 2020-12-09 RX ADMIN — METFORMIN HYDROCHLORIDE 1000 MG: 1000 TABLET ORAL at 20:04

## 2020-12-09 RX ADMIN — INSULIN GLARGINE 5 UNITS: 100 INJECTION, SOLUTION SUBCUTANEOUS at 08:03

## 2020-12-09 RX ADMIN — TOBRAMYCIN AND DEXAMETHASONE 1 DROP: 3; 1 SUSPENSION/ DROPS OPHTHALMIC at 22:10

## 2020-12-09 RX ADMIN — OXYCODONE HYDROCHLORIDE 5 MG: 5 TABLET ORAL at 12:29

## 2020-12-09 RX ADMIN — FOLIC ACID 1000 MCG: 1 TABLET ORAL at 20:04

## 2020-12-09 RX ADMIN — OXYCODONE HYDROCHLORIDE 5 MG: 5 TABLET ORAL at 18:27

## 2020-12-09 RX ADMIN — OXYCODONE HYDROCHLORIDE 5 MG: 5 TABLET ORAL at 07:03

## 2020-12-09 RX ADMIN — SODIUM CHLORIDE, PRESERVATIVE FREE 3 ML: 5 INJECTION INTRAVENOUS at 09:40

## 2020-12-09 RX ADMIN — INSULIN LISPRO 2 UNITS: 100 INJECTION, SOLUTION INTRAVENOUS; SUBCUTANEOUS at 09:39

## 2020-12-09 RX ADMIN — OXYCODONE HYDROCHLORIDE 5 MG: 5 TABLET ORAL at 03:02

## 2020-12-09 RX ADMIN — TOBRAMYCIN AND DEXAMETHASONE 1 DROP: 3; 1 SUSPENSION/ DROPS OPHTHALMIC at 15:47

## 2020-12-09 RX ADMIN — PANTOPRAZOLE SODIUM 40 MG: 40 TABLET, DELAYED RELEASE ORAL at 08:03

## 2020-12-09 RX ADMIN — DOCUSATE SODIUM 50MG AND SENNOSIDES 8.6MG 2 TABLET: 8.6; 5 TABLET, FILM COATED ORAL at 20:04

## 2020-12-09 RX ADMIN — VALSARTAN 160 MG: 160 TABLET, FILM COATED ORAL at 09:39

## 2020-12-09 RX ADMIN — HYDROMORPHONE HYDROCHLORIDE 0.5 MG: 1 INJECTION, SOLUTION INTRAMUSCULAR; INTRAVENOUS; SUBCUTANEOUS at 21:30

## 2020-12-09 RX ADMIN — INFLUENZA VIRUS VACCINE 0.5 ML: 15; 15; 15; 15 SUSPENSION INTRAMUSCULAR at 13:16

## 2020-12-09 RX ADMIN — ERYTHROMYCIN: 5 OINTMENT OPHTHALMIC at 09:39

## 2020-12-09 RX ADMIN — ESCITALOPRAM 20 MG: 20 TABLET, FILM COATED ORAL at 20:04

## 2020-12-09 RX ADMIN — CEFAZOLIN 3 G: 10 INJECTION, POWDER, FOR SOLUTION INTRAVENOUS at 03:04

## 2020-12-09 RX ADMIN — INSULIN LISPRO 2 UNITS: 100 INJECTION, SOLUTION INTRAVENOUS; SUBCUTANEOUS at 13:15

## 2020-12-09 RX ADMIN — ENOXAPARIN SODIUM 40 MG: 40 INJECTION SUBCUTANEOUS at 09:39

## 2020-12-09 RX ADMIN — PROPARACAINE HYDROCHLORIDE 2 DROP: 5 SOLUTION/ DROPS OPHTHALMIC at 12:19

## 2020-12-09 RX ADMIN — CEFAZOLIN 3 G: 10 INJECTION, POWDER, FOR SOLUTION INTRAVENOUS at 18:28

## 2020-12-09 RX ADMIN — KETOROLAC TROMETHAMINE 1 DROP: 5 SOLUTION OPHTHALMIC at 15:47

## 2020-12-09 RX ADMIN — HYDROCHLOROTHIAZIDE 12.5 MG: 12.5 CAPSULE ORAL at 09:39

## 2020-12-09 RX ADMIN — HYDROMORPHONE HYDROCHLORIDE 0.5 MG: 1 INJECTION, SOLUTION INTRAMUSCULAR; INTRAVENOUS; SUBCUTANEOUS at 14:59

## 2020-12-09 RX ADMIN — ROSUVASTATIN CALCIUM 20 MG: 20 TABLET, FILM COATED ORAL at 20:04

## 2020-12-09 RX ADMIN — ERYTHROMYCIN: 5 OINTMENT OPHTHALMIC at 21:31

## 2020-12-09 RX ADMIN — SODIUM CHLORIDE, PRESERVATIVE FREE 3 ML: 5 INJECTION INTRAVENOUS at 20:04

## 2020-12-09 RX ADMIN — METFORMIN HYDROCHLORIDE 1000 MG: 1000 TABLET ORAL at 08:03

## 2020-12-09 RX ADMIN — AMLODIPINE BESYLATE 5 MG: 5 TABLET ORAL at 20:04

## 2020-12-09 RX ADMIN — OXYCODONE HYDROCHLORIDE 5 MG: 5 TABLET ORAL at 23:08

## 2020-12-09 NOTE — PLAN OF CARE
Goal Outcome Evaluation:  Plan of Care Reviewed With: patient  Progress: (P) improving  Outcome Summary: (P) Pt undergone buried penis repair with skin graft. wound vac in place 75mmhg. complains of pain in eyes. potential abraision. erythromycin ointment ordered and artificial tears. VSS continue to monitor

## 2020-12-09 NOTE — CONSULTS
CONSULT NOTE    INTERNAL MEDICINE   [facility]       Patient Identification:  Name: Ray London  Age: 45 y.o.  Sex: male  :  1975  MRN: [id number]             Date of Consultation:  20          Primary Care Physician: Claudette Lux PA-C                               Requesting Physician: nayla dunne  Reason for Consultation:diabetes, hypertension    Chief Complaint:  45 year old gentleman who was admitted following surgery by dr dunne; he has a history of diabetes and hypertension and we are asked to see him for perioperative management; he is complaining of pain of his right eye following surgery; he feels like there is something in it    History of Present Illness:   As above      Past Medical History:  Past Medical History:   Diagnosis Date   • Anxiety    • Depression    • Diabetes mellitus (CMS/HCC)    • ED (erectile dysfunction)    • Erectile dysfunction    • Folic acid deficiency    • GERD (gastroesophageal reflux disease)    • History of cellulitis     RT LEG KNEE TO FOOT   • Hyperlipidemia    • Hypertension    • Obesity    • Psoriasis    • Sleep apnea     NO CPAP USE   • Visual impairment      Past Surgical History:  Past Surgical History:   Procedure Laterality Date   • ADENOIDECTOMY     • TONSILLECTOMY        Home Meds:  Medications Prior to Admission   Medication Sig Dispense Refill Last Dose   • amLODIPine (NORVASC) 5 MG tablet Take 1 tablet by mouth Daily. For BP (Patient taking differently: Take 5 mg by mouth Every Night. For BP) 30 tablet 5 2020 at 2330   • clobetasol (TEMOVATE) 0.05 % ointment Apply 1 application topically to the appropriate area as directed As Needed.   Past Month at Unknown time   • [facility] D3 50 MCG (2000 UT) capsule TAKE ONE CAPSULE BY MOUTH EVERY DAY (Patient taking differently: Take 2,000 Units by mouth Every Night.) 90 capsule 3 Past Month at Unknown time   • Empagliflozin (Jardiance) 25 MG tablet Take 25 mg by mouth Daily. For DMII  (Patient taking differently: Take 25 mg by mouth Every Night. For DMII) 30 tablet 5 12/7/2020 at 2330   • escitalopram (LEXAPRO) 20 MG tablet ONE TAB DAILY FOR STRESS (Patient taking differently: Take 20 mg by mouth Every Night. ONE TAB DAILY FOR STRESS) 90 tablet 3 12/7/2020 at 2330   • folic acid (FOLVITE) 1 MG tablet Take 1 tablet by mouth Daily. 90 tablet 3 12/7/2020 at 2330   • metFORMIN (Glucophage) 500 MG tablet Take 2 tablets by mouth 2 (Two) Times a Day With Meals. For DMII 360 tablet 1 12/7/2020 at 2330   • omeprazole (priLOSEC) 20 MG capsule TAKE 1 CAPSULE BY MOUTH DAILY. FOR GERD (Patient taking differently: Take 20 mg by mouth Every Night. For GERD) 90 capsule 3 12/7/2020 at 2330   • rosuvastatin (Crestor) 20 MG tablet Take 1 tablet by mouth Daily. For cholesterol--new dose (Patient taking differently: Take 20 mg by mouth Every Night. For cholesterol--new dose) 30 tablet 11 12/7/2020 at 2330   • valsartan-hydrochlorothiazide (DIOVAN-HCT) 160-12.5 MG per tablet Take 1 tablet by mouth Daily. for blood pressure. (Patient taking differently: Take 1 tablet by mouth Every Night. for blood pressure.) 30 tablet 5 12/7/2020 at 2330   • Dulaglutide (Trulicity) 1.5 MG/0.5ML solution pen-injector Inject 1.5 mg under the skin into the appropriate area as directed 1 (One) Time Per Week. Inject 1.5mg SC once weekly for DMII (Patient taking differently: Inject 1.5 mg under the skin into the appropriate area as directed 1 (One) Time Per Week. Inject 1.5mg SC once weekly for DMII ON WEDNESDAY) 4 pen 5 12/2/2020   • STELARA 90 MG/ML solution prefilled syringe Injection Inject  under the skin into the appropriate area as directed Every 3 (Three) Months. DUE IN JANUARY   10/2020     Current Meds:     Current Facility-Administered Medications:   •  acetaminophen (TYLENOL) tablet 650 mg, 650 mg, Oral, Q4H PRN **OR** acetaminophen (TYLENOL) suppository 650 mg, 650 mg, Rectal, Q4H PRN, Terry Conteh MD  •  amLODIPine  (NORVASC) tablet 5 mg, 5 mg, Oral, Nightly, Terry Conteh MD  •  ceFAZolin in Sodium Chloride (ANCEF) IVPB solution 3 g, 3 g, Intravenous, Q8H, Terry Conteh MD, Last Rate: 200 mL/hr at 12/08/20 1847, 3 g at 12/08/20 1847  •  diphenhydrAMINE (BENADRYL) capsule 25 mg, 25 mg, Oral, Q6H PRN, Terry Conteh MD  •  [START ON 12/9/2020] enoxaparin (LOVENOX) syringe 40 mg, 40 mg, Subcutaneous, Daily, Terry Conteh MD  •  escitalopram (LEXAPRO) tablet 20 mg, 20 mg, Oral, Nightly, Terry Conteh MD  •  folic acid (FOLVITE) tablet 1,000 mcg, 1,000 mcg, Oral, Daily, Terry Conteh MD  •  Glycerin-Hypromellose- (ARTIFICIAL TEARS) 0.2-0.2-1 % ophthalmic solution solution 1 drop, 1 drop, Both Eyes, Q1H PRN, Terry Conteh MD, 1 drop at 12/08/20 1847  •  HYDROmorphone (DILAUDID) injection 0.5 mg, 0.5 mg, Intravenous, Q2H PRN, 0.5 mg at 12/08/20 1844 **AND** naloxone (NARCAN) injection 0.1 mg, 0.1 mg, Intravenous, Q5 Min PRN, Terry Conteh MD  •  [START ON 12/9/2020] influenza vac split quad (FLUZONE,FLUARIX,AFLURIA,FLULAVAL) injection 0.5 mL, 0.5 mL, Intramuscular, Once, Terry Conteh MD  •  lactated ringers infusion, 9 mL/hr, Intravenous, Continuous, Kartik Galeano MD, Stopped at 12/08/20 1541  •  metFORMIN (GLUCOPHAGE) tablet 1,000 mg, 1,000 mg, Oral, BID With Meals, Terry Conteh MD  •  ondansetron (ZOFRAN) tablet 4 mg, 4 mg, Oral, Q6H PRN **OR** ondansetron (ZOFRAN) injection 4 mg, 4 mg, Intravenous, Q6H PRN, Terry Conteh MD  •  oxyCODONE (ROXICODONE) immediate release tablet 5 mg, 5 mg, Oral, Q4H PRN, Terry Conteh MD, 5 mg at 12/08/20 1457  •  rosuvastatin (CRESTOR) tablet 20 mg, 20 mg, Oral, Nightly, Terry Conteh MD  •  sennosides-docusate (PERICOLACE) 8.6-50 MG per tablet 2 tablet, 2 tablet, Oral, Nightly, Terry Conteh MD  •  sodium chloride 0.9 % flush 10 mL, 10 mL, Intravenous, PRN, Terry Conteh MD  •  sodium chloride  0.9 % flush 3 mL, 3 mL, Intravenous, Q12H, Terry Conteh MD  Allergies:  No Known Allergies  Social History:   Social History     Socioeconomic History   • Marital status:      Spouse name: Not on file   • Number of children: Not on file   • Years of education: Not on file   • Highest education level: Not on file   Tobacco Use   • Smoking status: Former Smoker     Packs/day: 0.50     Years: 12.00     Pack years: 6.00     Types: Cigarettes     Start date:      Quit date: 10/11/2011     Years since quittin.1   • Smokeless tobacco: Never Used   Substance and Sexual Activity   • Alcohol use: No   • Drug use: No   • Sexual activity: Not Currently     Partners: Female     Birth control/protection: Abstinence     Family History:  Family History   Problem Relation Age of Onset   • Hypertension Mother    • Anxiety disorder Mother    • Diabetes Maternal Grandmother    • Cancer Maternal Grandmother    • Alcohol abuse Father    • Cancer Maternal Aunt    • Alcohol abuse Paternal Grandfather    • Alcohol abuse Maternal Uncle    • Malig Hyperthermia Neg Hx           ; right Review of Systems  See history of present illness and past medical history.  Patient denies headache, dizziness, syncope, falls, trauma, change in vision, change in hearing, change in taste, changes in weight, changes in appetite, focal weakness, numbness, or paresthesia.  Patient denies chest pain, palpitations, dyspnea, orthopnea, PND, cough, sinus pressure, rhinorrhea, epistaxis, hemoptysis, nausea, vomiting,hematemesis, diarrhea, constipation or hematchezia.  Denies cold or heat intolerance, polydipsia, polyuria, polyphagia. Denies hematuria, pyuria, dysuria, hesitancy, frequency or urgency. Denies consumption of raw and under cooked meats foods or change in water source.  Denies fever, chills, sweats, night sweats.  Denies missing any routine medications. Remainder of ROS is negative.      Vitals:   /80 (BP Location: Left arm,  "Patient Position: Lying)   Pulse 85   Temp 97.4 °F (36.3 °C) (Oral)   Resp 18   Ht 182.9 cm (72\")   Wt (!) 174 kg (384 lb 3 oz)   SpO2 94%   BMI 52.11 kg/m²   I/O:     Intake/Output Summary (Last 24 hours) at 12/8/2020 1956  Last data filed at 12/8/2020 1909  Gross per 24 hour   Intake 1550 ml   Output 2050 ml   Net -500 ml     Exam:  General Appearance:    Alert, cooperative, no distress, appears stated age   Head:    Normocephalic, without obvious abnormality, atraumatic   Eyes:    PERRL, conjunctivae/corneas clear, EOM's intact, both eyes; right sclerae icteric   Ears:    Normal external ear canals, both ears   Nose:   Nares normal, septum midline, mucosa normal, no drainage    or sinus tenderness   Throat:   Lips, tongue, gums normal; oral mucosa pink and moist   Neck:   Supple, symmetrical, trachea midline, no adenopathy;     thyroid:  no enlargement/tenderness/nodules; no carotid    bruit or JVD   Back:     Symmetric, no curvature, ROM normal, no CVA tenderness   Lungs:     Decreased breath sounds bilaterally, respirations unlabored   Chest Wall:    No tenderness or deformity    Heart:    Regular rate and rhythm, S1 and S2 normal, no murmur, rub   or gallop   Abdomen:     Soft, nontender, bowel sounds active all four quadrants,     no masses, no hepatomegaly, no splenomegaly   Extremities:   Extremities normal, atraumatic, no cyanosis or edema   Pulses:   Pulses palpable in all extremities; symmetric all extremities   Skin:   Skin color normal, Skin is warm and dry,  no rashes or palpable lesions   Neurologic:   CNII-XII intact, motor strength grossly intact, sensation grossly intact to light touch, no focal deficits noted       Data Review:  Labs in chart were reviewed.  No results found for: WBC, HGB, HCT, PLT  No results found for: NA, K, CL, CO2, BUN, CREATININE, GLUCOSE  No results found for: CALCIUM, MG, PHOS  No results found for: AST, ALT, ALKPHOS  No results found for: APTT, INR        "     Imaging Results (Last 7 Days)     ** No results found for the last 168 hours. 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   diabetes  Hypertension  Right eye pain  Obesity  Sleep apnea    Plan:  Will order sliding scale insulin and accu checks  Monitor blood pressure and adjust medications as indicated  Corneal abrasion protocol minus numbing drops for eye pain  Check labs in the am  Thanks for involving us in her care    Leydi Danielson MD   12/8/2020  19:56 EST

## 2020-12-09 NOTE — PLAN OF CARE
Goal Outcome Evaluation:  Plan of Care Reviewed With: patient  Progress: improving  Patient doing well today.  Pain controlled with oxycodone x2 & dilaudid x1.  New eye drops ordered for corneal abrasion.  Tobradex q2hrs scheduled.  Ketorolac TID prn for pain.  Patient states left eye feeling much better.  Hypoactive bowel sounds. Tolerating diet well.  Continues with ancef antibiotics.  Will continue to monitor.

## 2020-12-09 NOTE — PROGRESS NOTES
[facility] Medicine Services  PROGRESS NOTE    Patient Name: Ray London  : 1975  MRN: [id number]    Date of Admission: 2020  Primary Care Physician: Claudette Lux PA-C    Subjective   Subjective     CC:  Follow-up consult for medical management diabetes and hypertension    HPI:  Patient complaining of eye pain today, thinks he may have scratched his eye after the surgery accidentally.  Asking for numbing drops.  No other new complaints.    Review of Systems  No current fevers or chills  No current shortness of breath or cough  No current nausea, vomiting, or diarrhea  No current chest pain or palpitations      Objective   Objective     Vital Signs:   Temp:  [97.3 °F (36.3 °C)-99.2 °F (37.3 °C)] 98.7 °F (37.1 °C)  Heart Rate:  [78-87] 78  Resp:  [16-18] 16  BP: (122-140)/(77-87) 122/81        Physical Exam:  Constitutional:Awake, alert  HENT: Right eye with some erythema on the conjunctiva, no obvious globe defect, NCAT, mucous membranes moist, neck supple  Respiratory: Clear to auscultation bilaterally, respiratory effort normal, nonlabored breathing   Cardiovascular: RRR, normal radial pulses  Gastrointestinal: Positive bowel sounds, soft, nontender, nondistended  Musculoskeletal: Morbid obesity, minimal lower extremity edema, BMI is 52  Psychiatric: Appropriate affect, cooperative, conversational  Neurologic: No slurred speech or facial droop, follows commands  Skin: No rashes or jaundice, warm      Results Reviewed:  Results from last 7 days   Lab Units 20  0802   WBC 10*3/mm3 11.87*   HEMOGLOBIN g/dL 15.0   HEMATOCRIT % 45.0   PLATELETS 10*3/mm3 205     Results from last 7 days   Lab Units 20  0802   SODIUM mmol/L 133*   POTASSIUM mmol/L 4.1   CHLORIDE mmol/L 98   CO2 mmol/L 24.9   BUN mg/dL 16   CREATININE mg/dL 1.02   GLUCOSE mg/dL 174*   CALCIUM mg/dL 8.4*     Estimated Creatinine Clearance: 151.6 mL/min (by C-G formula based on SCr of 1.02 mg/dL).    Microbiology  Results Abnormal     None          Imaging Results (Last 24 Hours)     ** No results found for the last 24 hours. **               I have reviewed the medications:  Scheduled Meds:amLODIPine, 5 mg, Oral, Nightly  ceFAZolin, 3 g, Intravenous, Q8H  enoxaparin, 40 mg, Subcutaneous, Daily  erythromycin, , Both Eyes, Q12H  escitalopram, 20 mg, Oral, Nightly  folic acid, 1,000 mcg, Oral, Daily  hydroCHLOROthiazide Oral, 12.5 mg, Oral, Daily  influenza vaccine, 0.5 mL, Intramuscular, Once  insulin glargine, 5 Units, Subcutaneous, QAM  insulin lispro, 2 Units, Subcutaneous, TID With Meals  metFORMIN, 1,000 mg, Oral, BID With Meals  pantoprazole, 40 mg, Oral, Q AM  rosuvastatin, 20 mg, Oral, Nightly  sennosides-docusate, 2 tablet, Oral, Nightly  sodium chloride, 3 mL, Intravenous, Q12H  valsartan, 160 mg, Oral, Q24H      Continuous Infusions:lactated ringers, 9 mL/hr, Last Rate: Stopped (12/08/20 1541)      PRN Meds:.•  acetaminophen **OR** acetaminophen  •  diphenhydrAMINE  •  Glycerin-Hypromellose-  •  HYDROmorphone **AND** naloxone  •  ondansetron **OR** ondansetron  •  oxyCODONE  •  sodium chloride    Assessment/Plan   Assessment & Plan     Active Hospital Problems    Diagnosis  POA   • 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        Brief Hospital Course to date:  Ray London is a 45 y.o. male with past medical history of morbid obesity who was hospitalized after surgery for acquired buried penis, and received surgical intervention on 12/8 for buried penis release.  Hospital medicine has been consulted to assist with management of diabetes and essential hypertension and it appears patient has accidentally scratched his eye postoperatively and may have corneal abrasion.    Buried penis:  Management per urology.  Status post surgical intervention on 12/8    Possible corneal abrasion:  Add numbing drops today.  One-time dose per pharmacist.  Continue  your with erythromycin ointment.  Pharmacist recommended ketorolac drops if pain continues.  Consult ophthalmology.    Essential hypertension:  Controlled, valsartan and hydrochlorothiazide.    Type 2 diabetes mellitus:  Prandial and basal insulin added to correction scale.  Monitor glucose and adjust further as needed.    Morbid obesity: Weight loss recommended.  Supportive care.    DVT Prophylaxis:  LVX    CODE STATUS:   Code Status and Medical Interventions:   Ordered at: 12/08/20 1338     Level Of Support Discussed With:    Patient     Code Status:    CPR     Medical Interventions (Level of Support Prior to Arrest):    Full       Asher Block MD  12/09/20

## 2020-12-10 LAB
GLUCOSE BLDC GLUCOMTR-MCNC: 140 MG/DL (ref 70–130)
GLUCOSE BLDC GLUCOMTR-MCNC: 148 MG/DL (ref 70–130)
GLUCOSE BLDC GLUCOMTR-MCNC: 151 MG/DL (ref 70–130)
GLUCOSE BLDC GLUCOMTR-MCNC: 182 MG/DL (ref 70–130)

## 2020-12-10 PROCEDURE — 0VNS0ZZ RELEASE PENIS, OPEN APPROACH: ICD-10-PCS | Performed by: UROLOGY

## 2020-12-10 PROCEDURE — 25010000002 ENOXAPARIN PER 10 MG: Performed by: UROLOGY

## 2020-12-10 PROCEDURE — 63710000001 DIPHENHYDRAMINE PER 50 MG: Performed by: UROLOGY

## 2020-12-10 PROCEDURE — 0HBJXZZ EXCISION OF LEFT UPPER LEG SKIN, EXTERNAL APPROACH: ICD-10-PCS | Performed by: UROLOGY

## 2020-12-10 PROCEDURE — 0TJB8ZZ INSPECTION OF BLADDER, VIA NATURAL OR ARTIFICIAL OPENING ENDOSCOPIC: ICD-10-PCS | Performed by: UROLOGY

## 2020-12-10 PROCEDURE — 25010000002 CEFAZOLIN PER 500 MG: Performed by: UROLOGY

## 2020-12-10 PROCEDURE — 82962 GLUCOSE BLOOD TEST: CPT

## 2020-12-10 PROCEDURE — 0HRAX74 REPLACEMENT OF INGUINAL SKIN WITH AUTOLOGOUS TISSUE SUBSTITUTE, PARTIAL THICKNESS, EXTERNAL APPROACH: ICD-10-PCS | Performed by: UROLOGY

## 2020-12-10 PROCEDURE — 25010000002 HYDROMORPHONE PER 4 MG: Performed by: UROLOGY

## 2020-12-10 PROCEDURE — 63710000001 INSULIN GLARGINE PER 5 UNITS: Performed by: INTERNAL MEDICINE

## 2020-12-10 PROCEDURE — 63710000001 INSULIN LISPRO (HUMAN) PER 5 UNITS: Performed by: INTERNAL MEDICINE

## 2020-12-10 RX ADMIN — CEFAZOLIN 3 G: 10 INJECTION, POWDER, FOR SOLUTION INTRAVENOUS at 02:42

## 2020-12-10 RX ADMIN — HYDROMORPHONE HYDROCHLORIDE 0.5 MG: 1 INJECTION, SOLUTION INTRAMUSCULAR; INTRAVENOUS; SUBCUTANEOUS at 09:25

## 2020-12-10 RX ADMIN — ERYTHROMYCIN: 5 OINTMENT OPHTHALMIC at 21:14

## 2020-12-10 RX ADMIN — ERYTHROMYCIN: 5 OINTMENT OPHTHALMIC at 08:32

## 2020-12-10 RX ADMIN — TOBRAMYCIN AND DEXAMETHASONE 1 DROP: 3; 1 SUSPENSION/ DROPS OPHTHALMIC at 14:31

## 2020-12-10 RX ADMIN — PANTOPRAZOLE SODIUM 40 MG: 40 TABLET, DELAYED RELEASE ORAL at 06:11

## 2020-12-10 RX ADMIN — HYDROMORPHONE HYDROCHLORIDE 0.5 MG: 1 INJECTION, SOLUTION INTRAMUSCULAR; INTRAVENOUS; SUBCUTANEOUS at 18:35

## 2020-12-10 RX ADMIN — OXYCODONE HYDROCHLORIDE 5 MG: 5 TABLET ORAL at 06:11

## 2020-12-10 RX ADMIN — METFORMIN HYDROCHLORIDE 1000 MG: 1000 TABLET ORAL at 21:14

## 2020-12-10 RX ADMIN — ESCITALOPRAM 20 MG: 20 TABLET, FILM COATED ORAL at 21:14

## 2020-12-10 RX ADMIN — TOBRAMYCIN AND DEXAMETHASONE 1 DROP: 3; 1 SUSPENSION/ DROPS OPHTHALMIC at 08:34

## 2020-12-10 RX ADMIN — HYDROMORPHONE HYDROCHLORIDE 0.5 MG: 1 INJECTION, SOLUTION INTRAMUSCULAR; INTRAVENOUS; SUBCUTANEOUS at 03:28

## 2020-12-10 RX ADMIN — ROSUVASTATIN CALCIUM 20 MG: 20 TABLET, FILM COATED ORAL at 21:14

## 2020-12-10 RX ADMIN — DIPHENHYDRAMINE HYDROCHLORIDE 25 MG: 25 CAPSULE ORAL at 21:15

## 2020-12-10 RX ADMIN — TOBRAMYCIN AND DEXAMETHASONE 1 DROP: 3; 1 SUSPENSION/ DROPS OPHTHALMIC at 06:11

## 2020-12-10 RX ADMIN — METFORMIN HYDROCHLORIDE 1000 MG: 1000 TABLET ORAL at 08:31

## 2020-12-10 RX ADMIN — ENOXAPARIN SODIUM 40 MG: 40 INJECTION SUBCUTANEOUS at 08:41

## 2020-12-10 RX ADMIN — TOBRAMYCIN AND DEXAMETHASONE 1 DROP: 3; 1 SUSPENSION/ DROPS OPHTHALMIC at 10:45

## 2020-12-10 RX ADMIN — HYDROCHLOROTHIAZIDE 12.5 MG: 12.5 CAPSULE ORAL at 08:31

## 2020-12-10 RX ADMIN — INSULIN GLARGINE 5 UNITS: 100 INJECTION, SOLUTION SUBCUTANEOUS at 08:41

## 2020-12-10 RX ADMIN — OXYCODONE HYDROCHLORIDE 5 MG: 5 TABLET ORAL at 21:14

## 2020-12-10 RX ADMIN — AMLODIPINE BESYLATE 5 MG: 5 TABLET ORAL at 21:14

## 2020-12-10 RX ADMIN — FOLIC ACID 1000 MCG: 1 TABLET ORAL at 21:14

## 2020-12-10 RX ADMIN — TOBRAMYCIN AND DEXAMETHASONE 1 DROP: 3; 1 SUSPENSION/ DROPS OPHTHALMIC at 12:11

## 2020-12-10 RX ADMIN — TOBRAMYCIN AND DEXAMETHASONE 1 DROP: 3; 1 SUSPENSION/ DROPS OPHTHALMIC at 18:25

## 2020-12-10 RX ADMIN — TOBRAMYCIN AND DEXAMETHASONE 1 DROP: 3; 1 SUSPENSION/ DROPS OPHTHALMIC at 16:47

## 2020-12-10 RX ADMIN — INSULIN LISPRO 2 UNITS: 100 INJECTION, SOLUTION INTRAVENOUS; SUBCUTANEOUS at 12:11

## 2020-12-10 RX ADMIN — SODIUM CHLORIDE, PRESERVATIVE FREE 3 ML: 5 INJECTION INTRAVENOUS at 21:22

## 2020-12-10 RX ADMIN — TOBRAMYCIN AND DEXAMETHASONE 1 DROP: 3; 1 SUSPENSION/ DROPS OPHTHALMIC at 21:14

## 2020-12-10 RX ADMIN — DOCUSATE SODIUM 50MG AND SENNOSIDES 8.6MG 2 TABLET: 8.6; 5 TABLET, FILM COATED ORAL at 21:14

## 2020-12-10 RX ADMIN — VALSARTAN 160 MG: 160 TABLET, FILM COATED ORAL at 08:31

## 2020-12-10 RX ADMIN — HYDROMORPHONE HYDROCHLORIDE 0.5 MG: 1 INJECTION, SOLUTION INTRAMUSCULAR; INTRAVENOUS; SUBCUTANEOUS at 15:24

## 2020-12-10 RX ADMIN — INSULIN LISPRO 2 UNITS: 100 INJECTION, SOLUTION INTRAVENOUS; SUBCUTANEOUS at 17:41

## 2020-12-10 NOTE — PROGRESS NOTES
Discharge Planning Assessment  [facility]     Patient Name: Ray London  MRN: [id number]  Today's Date: 12/10/2020    Admit Date: 12/8/2020    Discharge Needs Assessment     Row Name 12/10/20 0931       Living Environment    Lives With  spouse    Current Living Arrangements  home/apartment/condo    Potentially Unsafe Housing Conditions  other (see comments) no concerns    Primary Care Provided by  self    Provides Primary Care For  no one    Family Caregiver if Needed  spouse    Quality of Family Relationships  helpful;involved;supportive    Able to Return to Prior Arrangements  yes       Resource/Environmental Concerns    Resource/Environmental Concerns  none    Home Accessibility Concerns  stairs to enter home       Transition Planning    Patient/Family Anticipates Transition to  home    Patient/Family Anticipated Services at Transition  none    Transportation Anticipated  family or friend will provide       Discharge Needs Assessment    Readmission Within the Last 30 Days  no previous admission in last 30 days    Equipment Currently Used at Home  glucometer        Discharge Plan     Row Name 12/10/20 0932       Plan    Plan  Home with spouse; follow for wound vac needs    Patient/Family in Agreement with Plan  yes    Plan Comments  CCP met with patient at bedside. CCP role explained and face sheet verified. Patient’s PCP is Dr. Lux. Patient lives with his spouse, in a second floor condo (handrails present at the steps). Patient is independent with his ADLs and does not use DME. Patient has not used home health or been to SNF. Patient has glucometer and checks his blood sugar once a day. Patient’s preferred pharmacy is [facility] on [facility]; patient denies having trouble obtaining his medications. Patient plans to return home with assistance from his wife. Patient currently on bedrest and has wound vac. CCP will follow for skilled needs and assist as needed. Racquel NGUYEN        Continued Care and  Services - Admitted Since 12/8/2020    Coordination has not been started for this encounter.         Demographic Summary     Row Name 12/10/20 0922       General Information    Admission Type  observation    Referral Source  admission list    Reason for Consult  discharge planning    Preferred Language  English     Used During This Interaction  no        Functional Status     Row Name 12/10/20 0923       Functional Status    Usual Activity Tolerance  good    Current Activity Tolerance  good       Functional Status, IADL    Medications  independent    Meal Preparation  independent    Housekeeping  independent    Laundry  independent    Shopping  independent       Mental Status    General Appearance WDL  WDL       Mental Status Summary    Recent Changes in Mental Status/Cognitive Functioning  no changes        Psychosocial    No documentation.       Abuse/Neglect    No documentation.       Legal    No documentation.       Substance Abuse    No documentation.       Patient Forms    No documentation.           WENDY Farmer

## 2020-12-10 NOTE — PROGRESS NOTES
POD1 s/p release of buried penis    S: Comfortable, currently on bedrest. Pain well controlled. No nausea. Reports right corneal abrasion after surgery.    AVSS  Good UO  Abd soft  Phallus dressed  VAC dressing holding suction  Hidalgo intact, urine yellow and clear    Hb 15.0, Cr 1.02, WBC 11.9    Plan:  - continue bedrest  - VAC to suction  - continue hidalgo

## 2020-12-10 NOTE — PROGRESS NOTES
[facility]     LOS: 0 days     Subjective:    CC:No chief complaint on file.    DIET:  Diet Order   Procedures   • Diet Regular; Consistent Carbohydrate   Severe right eye pain which is now improving  No soa  No cp  Patient's wound pain is reasonably controlled      Objective:    Vital Signs:  Temp:  [97.3 °F (36.3 °C)-99.2 °F (37.3 °C)] 98 °F (36.7 °C)  Heart Rate:  [68-80] 77  Resp:  [16-18] 16  BP: (120-135)/(72-83) 120/72    SpO2:  [85 %-98 %] 95 %  on  Flow (L/min):  [2] 2;   Device (Oxygen Therapy): nasal cannula  Body mass index is 52.11 kg/m².    Physical Exam  Constitutional:       Appearance: He is well-developed.   HENT:      Head: Normocephalic and atraumatic.   Eyes:      General:         Right eye: No discharge.         Left eye: No discharge.      Conjunctiva/sclera: Conjunctivae normal.      Pupils: Pupils are equal, round, and reactive to light.   Cardiovascular:      Rate and Rhythm: Normal rate and regular rhythm.      Heart sounds: No murmur. No friction rub.   Pulmonary:      Effort: Pulmonary effort is normal.      Breath sounds: Normal breath sounds.   Abdominal:      General: Bowel sounds are normal. There is no distension.      Palpations: Abdomen is soft.      Tenderness: There is no abdominal tenderness.   Skin:     General: Skin is warm and dry.   Neurological:      General: No focal deficit present.      Mental Status: He is alert.   Psychiatric:         Mood and Affect: Mood normal.         Behavior: Behavior normal.         Results Review:    Glucose   Date Value Ref Range Status   12/09/2020 174 (H) 65 - 99 mg/dL Final     Results from last 7 days   Lab Units 12/09/20  0802   WBC 10*3/mm3 11.87*   HEMOGLOBIN g/dL 15.0   HEMATOCRIT % 45.0   PLATELETS 10*3/mm3 205     Results from last 7 days   Lab Units 12/09/20  0802   SODIUM mmol/L 133*   POTASSIUM mmol/L 4.1   CHLORIDE mmol/L 98   CO2 mmol/L 24.9   BUN mg/dL 16   CREATININE mg/dL 1.02   CALCIUM mg/dL  8.4*   GLUCOSE mg/dL 174*         Results from last 7 days   Lab Units 12/09/20  0802   MAGNESIUM mg/dL 2.1         Cultures:  No results found for: BLOODCX, URINECX, WOUNDCX, MRSACX, RESPCX, STOOLCX    I have reviewed daily medications and changes in CPOE    Scheduled meds  amLODIPine, 5 mg, Oral, Nightly  enoxaparin, 40 mg, Subcutaneous, Daily  erythromycin, , Both Eyes, Q12H  escitalopram, 20 mg, Oral, Nightly  folic acid, 1,000 mcg, Oral, Daily  hydroCHLOROthiazide Oral, 12.5 mg, Oral, Daily  insulin glargine, 5 Units, Subcutaneous, QAM  insulin lispro, 2 Units, Subcutaneous, TID With Meals  metFORMIN, 1,000 mg, Oral, BID With Meals  pantoprazole, 40 mg, Oral, Q AM  rosuvastatin, 20 mg, Oral, Nightly  sennosides-docusate, 2 tablet, Oral, Nightly  sodium chloride, 3 mL, Intravenous, Q12H  tobramycin-dexamethasone, 1 drop, Right Eye, Q2H While Awake  valsartan, 160 mg, Oral, Q24H        lactated ringers, 9 mL/hr, Last Rate: Stopped (12/08/20 1541)      PRN meds  •  acetaminophen **OR** acetaminophen  •  diphenhydrAMINE  •  Glycerin-Hypromellose-  •  HYDROmorphone **AND** naloxone  •  ketorolac  •  ondansetron **OR** ondansetron  •  oxyCODONE  •  sodium chloride        Acquired buried penis    Morbid obesity (CMS/[facility])    Type 2 diabetes mellitus with hyperglycemia (CMS/[facility])    Essential hypertension, benign        Assessment/Plan:  Brief Hospital Course to date:  Ray London is a 45 y.o. male with past medical history of morbid obesity who was hospitalized after surgery for acquired buried penis, and received surgical intervention on 12/8 for buried penis release.  Hospital medicine has been consulted to assist with management of diabetes and essential hypertension and it appears patient has accidentally scratched his eye postoperatively and may have corneal abrasion.  For which ophthalmology was called and they have started the patient on TobraDex drops     Buried penis:  Management per urology.   Status post surgical intervention on 12/8     Possible corneal abrasion:  TobraDex drops     Consult ophthalmology has placed orders for drops     Essential hypertension:  Blood pressures are doing well, valsartan and hydrochlorothiazide.     Type 2 diabetes mellitus:  Prandial and basal insulin added to correction scale.  Monitor glucose and adjust further as needed.  -I have advised the patient to stop his Jardiance until peritoneal wound has healed secondary to slight increased risk for perineal infection     Morbid obesity: Weight loss recommended.  Supportive care.     DVT Prophylaxis:  LVX        Asher Page MD  12/10/20  16:28 EST

## 2020-12-10 NOTE — PLAN OF CARE
Goal Outcome Evaluation:  Plan of Care Reviewed With: patient  Progress: no change  Outcome Summary: eyes drops given fili 2hrs as ordered, pain is improving, given dilaudid x 2 for penile and eye pain, wound vac patent, logged rolling, continue to monitor

## 2020-12-10 NOTE — PLAN OF CARE
Goal Outcome Evaluation:  Plan of Care Reviewed With: patient  Progress: improving  Outcome Summary: tobramycin eye drop Q2hrs while awake to right eye, improving; pain to incision area increasing, dilaudid and Roxicodone for pain control; Bedrest x5 days, assisting with log rolling

## 2020-12-11 LAB
GLUCOSE BLDC GLUCOMTR-MCNC: 124 MG/DL (ref 70–130)
GLUCOSE BLDC GLUCOMTR-MCNC: 139 MG/DL (ref 70–130)
GLUCOSE BLDC GLUCOMTR-MCNC: 149 MG/DL (ref 70–130)
GLUCOSE BLDC GLUCOMTR-MCNC: 192 MG/DL (ref 70–130)

## 2020-12-11 PROCEDURE — 82962 GLUCOSE BLOOD TEST: CPT

## 2020-12-11 PROCEDURE — 82962 GLUCOSE BLOOD TEST: CPT | Performed by: PHYSICIAN ASSISTANT

## 2020-12-11 PROCEDURE — 25010000002 ENOXAPARIN PER 10 MG: Performed by: UROLOGY

## 2020-12-11 PROCEDURE — 63710000001 INSULIN LISPRO (HUMAN) PER 5 UNITS: Performed by: INTERNAL MEDICINE

## 2020-12-11 PROCEDURE — 25010000002 HYDROMORPHONE PER 4 MG: Performed by: UROLOGY

## 2020-12-11 PROCEDURE — 63710000001 INSULIN GLARGINE PER 5 UNITS: Performed by: INTERNAL MEDICINE

## 2020-12-11 RX ORDER — HYDROCODONE BITARTRATE AND ACETAMINOPHEN 5; 325 MG/1; MG/1
1 TABLET ORAL EVERY 6 HOURS PRN
Qty: 30 TABLET | Refills: 0 | Status: SHIPPED | OUTPATIENT
Start: 2020-12-11 | End: 2021-05-13 | Stop reason: HOSPADM

## 2020-12-11 RX ORDER — DOCUSATE SODIUM 100 MG/1
100 CAPSULE, LIQUID FILLED ORAL DAILY PRN
Qty: 30 CAPSULE | Refills: 1 | Status: SHIPPED | OUTPATIENT
Start: 2020-12-11 | End: 2021-12-11

## 2020-12-11 RX ORDER — BACITRACIN ZINC 500 [USP'U]/G
OINTMENT TOPICAL
Qty: 30 G | Refills: 0 | Status: SHIPPED | OUTPATIENT
Start: 2020-12-11 | End: 2021-12-11

## 2020-12-11 RX ORDER — CEPHALEXIN 500 MG/1
500 CAPSULE ORAL 2 TIMES DAILY
Qty: 6 CAPSULE | Refills: 0 | Status: SHIPPED | OUTPATIENT
Start: 2020-12-11 | End: 2020-12-14

## 2020-12-11 RX ORDER — ACETAMINOPHEN 325 MG/1
650 TABLET ORAL EVERY 4 HOURS PRN
Qty: 100 TABLET | Refills: 2 | Status: SHIPPED | OUTPATIENT
Start: 2020-12-11 | End: 2022-02-09

## 2020-12-11 RX ADMIN — INSULIN LISPRO 2 UNITS: 100 INJECTION, SOLUTION INTRAVENOUS; SUBCUTANEOUS at 09:48

## 2020-12-11 RX ADMIN — TOBRAMYCIN AND DEXAMETHASONE 1 DROP: 3; 1 SUSPENSION/ DROPS OPHTHALMIC at 12:31

## 2020-12-11 RX ADMIN — HYDROMORPHONE HYDROCHLORIDE 0.5 MG: 1 INJECTION, SOLUTION INTRAMUSCULAR; INTRAVENOUS; SUBCUTANEOUS at 05:23

## 2020-12-11 RX ADMIN — ROSUVASTATIN CALCIUM 20 MG: 20 TABLET, FILM COATED ORAL at 21:21

## 2020-12-11 RX ADMIN — OXYCODONE HYDROCHLORIDE 5 MG: 5 TABLET ORAL at 12:35

## 2020-12-11 RX ADMIN — TOBRAMYCIN AND DEXAMETHASONE 1 DROP: 3; 1 SUSPENSION/ DROPS OPHTHALMIC at 09:49

## 2020-12-11 RX ADMIN — HYDROCHLOROTHIAZIDE 12.5 MG: 12.5 CAPSULE ORAL at 09:49

## 2020-12-11 RX ADMIN — ERYTHROMYCIN: 5 OINTMENT OPHTHALMIC at 21:21

## 2020-12-11 RX ADMIN — KETOROLAC TROMETHAMINE 1 DROP: 5 SOLUTION OPHTHALMIC at 21:21

## 2020-12-11 RX ADMIN — TOBRAMYCIN AND DEXAMETHASONE 1 DROP: 3; 1 SUSPENSION/ DROPS OPHTHALMIC at 21:22

## 2020-12-11 RX ADMIN — OXYCODONE HYDROCHLORIDE 5 MG: 5 TABLET ORAL at 17:24

## 2020-12-11 RX ADMIN — DOCUSATE SODIUM 50MG AND SENNOSIDES 8.6MG 2 TABLET: 8.6; 5 TABLET, FILM COATED ORAL at 21:21

## 2020-12-11 RX ADMIN — OXYCODONE HYDROCHLORIDE 5 MG: 5 TABLET ORAL at 02:08

## 2020-12-11 RX ADMIN — SODIUM CHLORIDE, PRESERVATIVE FREE 3 ML: 5 INJECTION INTRAVENOUS at 09:49

## 2020-12-11 RX ADMIN — HYDROMORPHONE HYDROCHLORIDE 0.5 MG: 1 INJECTION, SOLUTION INTRAMUSCULAR; INTRAVENOUS; SUBCUTANEOUS at 21:21

## 2020-12-11 RX ADMIN — FOLIC ACID 1000 MCG: 1 TABLET ORAL at 21:21

## 2020-12-11 RX ADMIN — ERYTHROMYCIN: 5 OINTMENT OPHTHALMIC at 09:49

## 2020-12-11 RX ADMIN — ENOXAPARIN SODIUM 40 MG: 40 INJECTION SUBCUTANEOUS at 09:49

## 2020-12-11 RX ADMIN — OXYCODONE HYDROCHLORIDE 5 MG: 5 TABLET ORAL at 06:50

## 2020-12-11 RX ADMIN — INSULIN GLARGINE 5 UNITS: 100 INJECTION, SOLUTION SUBCUTANEOUS at 09:48

## 2020-12-11 RX ADMIN — TOBRAMYCIN AND DEXAMETHASONE 1 DROP: 3; 1 SUSPENSION/ DROPS OPHTHALMIC at 15:35

## 2020-12-11 RX ADMIN — METFORMIN HYDROCHLORIDE 1000 MG: 1000 TABLET ORAL at 21:21

## 2020-12-11 RX ADMIN — AMLODIPINE BESYLATE 5 MG: 5 TABLET ORAL at 21:21

## 2020-12-11 RX ADMIN — INSULIN LISPRO 2 UNITS: 100 INJECTION, SOLUTION INTRAVENOUS; SUBCUTANEOUS at 12:31

## 2020-12-11 RX ADMIN — TOBRAMYCIN AND DEXAMETHASONE 1 DROP: 3; 1 SUSPENSION/ DROPS OPHTHALMIC at 17:25

## 2020-12-11 RX ADMIN — ESCITALOPRAM 20 MG: 20 TABLET, FILM COATED ORAL at 21:21

## 2020-12-11 RX ADMIN — METFORMIN HYDROCHLORIDE 1000 MG: 1000 TABLET ORAL at 09:49

## 2020-12-11 RX ADMIN — TOBRAMYCIN AND DEXAMETHASONE 1 DROP: 3; 1 SUSPENSION/ DROPS OPHTHALMIC at 05:20

## 2020-12-11 RX ADMIN — INSULIN LISPRO 2 UNITS: 100 INJECTION, SOLUTION INTRAVENOUS; SUBCUTANEOUS at 17:28

## 2020-12-11 RX ADMIN — VALSARTAN 160 MG: 160 TABLET, FILM COATED ORAL at 09:49

## 2020-12-11 RX ADMIN — PANTOPRAZOLE SODIUM 40 MG: 40 TABLET, DELAYED RELEASE ORAL at 05:20

## 2020-12-11 NOTE — PLAN OF CARE
Problem: Adult Inpatient Plan of Care  Goal: Plan of Care Review  Outcome: Ongoing, Progressing  Flowsheets  Taken 12/11/2020 0103 by Nano Kelly, RN  Outcome Summary: VITALS AS DOCUMENTED. WOUND VAC @ 76 CONTINUOUS. MINIMAL SS DRAINAGE UNDER TEGADERM TO LEFT THIGH SKIN GRAFT SITE. REPORTS RIGHT EYE BEING LESS RED AND LESS PAINFUL TODAY. PAIN MEDICATION PRN. WILL MONITOR.  Taken 12/10/2020 1656 by Amanda Davis RN  Plan of Care Reviewed With: patient  Goal: Patient-Specific Goal (Individualized)  Outcome: Ongoing, Progressing  Goal: Absence of Hospital-Acquired Illness or Injury  Outcome: Ongoing, Progressing  Intervention: Identify and Manage Fall Risk  Description: Perform standard risk assessment with a validated tool or comprehensive approach appropriate to the patient on admission; reassess fall risk frequently, with change in status or transfer to another level of care.  Communicate fall injury risk to interprofessional healthcare team.  Determine need for increased observation, equipment and environmental modification, such as low bed and signage, as well as supportive, nonskid footwear.  Adjust safety measures to individual developmental age, stage and identified risk factors.  Reinforce the importance of safety and physical activity with patient and family.  Perform regular intentional rounding to assess need for position change, pain assessment, personal needs, including assistance with toileting.  Recent Flowsheet Documentation  Taken 12/11/2020 0039 by Nano Kelly, RN  Safety Promotion/Fall Prevention:   activity supervised   assistive device/personal items within reach   clutter free environment maintained   fall prevention program maintained   safety round/check completed   room organization consistent   lighting adjusted  Taken 12/10/2020 2114 by Nano Kelly, RN  Safety Promotion/Fall Prevention:   activity supervised   assistive device/personal items within reach   clutter free  environment maintained   fall prevention program maintained   lighting adjusted   safety round/check completed   room organization consistent  Taken 12/10/2020 1940 by Nano Kelly RN  Safety Promotion/Fall Prevention:   activity supervised   assistive device/personal items within reach   fall prevention program maintained   lighting adjusted   clutter free environment maintained   room organization consistent   safety round/check completed  Intervention: Prevent Skin Injury  Description: Assess skin risk on admission and at regular intervals throughout hospital stay.  Keep all areas of skin (especially folds) clean and dry.  Maintain adequate skin hydration.  Relieve and redistribute pressure and protect bony prominences; implement measures based on patient-specific risk factors.  Match turning and repositioning schedule to clinical condition.  Encourage weight shift frequently; assist with reposition if unable to complete independently.  Float heels off bed. Avoid pressure on the Achilles tendon.  Keep skin free from extended contact with medical devices.  Use aids (e.g., slide boards, mechanical lift) during transfer.  Recent Flowsheet Documentation  Taken 12/11/2020 0039 by Nano Kelly, RN  Body Position:   weight shift assistance provided   neutral body alignment   neutral head position  Taken 12/10/2020 2114 by Nano Kelly RN  Body Position:   weight shift assistance provided   neutral body alignment   neutral head position  Taken 12/10/2020 1940 by Nano Kelly, RN  Body Position: position maintained  Intervention: Prevent and Manage VTE (venous thromboembolism) Risk  Description: Assess for VTE risk.  Encourage/assist with early ambulation.  Initiate and maintain compression or other therapy, as indicated based on identified risk in accordance with organizational protocol/provider order.  Encourage both active and passive leg exercises while in bed, if unable to ambulate.  Recent Flowsheet  Documentation  Taken 12/10/2020 2114 by Nano Kelly RN  VTE Prevention/Management:   bilateral   dorsiflexion/plantar flexion performed   sequential compression devices on  Intervention: Prevent Infection  Description: Maintain skin and mucous membrane integrity; promote hand, oral and pulmonary hygiene.  Optimize fluid balance, nutrition, sleep and glycemic control to maximize infection resistance.  Identify potential sources of infection early to prevent or mitigate progression of infection (e.g., wound, lines, devices).  Evaluate ongoing need for invasive devices; remove promptly when no longer indicated.  Recent Flowsheet Documentation  Taken 12/11/2020 0039 by Nano Kelly, RN  Infection Prevention:   visitors restricted/screened   single patient room provided   rest/sleep promoted   personal protective equipment utilized   hand hygiene promoted   equipment surfaces disinfected  Taken 12/10/2020 2114 by Nano Kelly RN  Infection Prevention:   visitors restricted/screened   single patient room provided   rest/sleep promoted   personal protective equipment utilized   hand hygiene promoted   equipment surfaces disinfected  Taken 12/10/2020 1940 by Nano Kelly RN  Infection Prevention:   visitors restricted/screened   single patient room provided   rest/sleep promoted   personal protective equipment utilized   hand hygiene promoted   equipment surfaces disinfected  Goal: Optimal Comfort and Wellbeing  Outcome: Ongoing, Progressing  Intervention: Provide Person-Centered Care  Description: Use a family-focused approach to care.  Develop trust and rapport by proactively providing information, encouraging questions, addressing concerns and offering reassurance.  Acknowledge emotional response to hospitalization.  Recognize and utilize personal coping strategies.  Honor spiritual and cultural preferences.  Recent Flowsheet Documentation  Taken 12/10/2020 2114 by Nano Kelly, ASIM  Trust  Relationship/Rapport:   care explained   choices provided   emotional support provided   empathic listening provided   questions answered   questions encouraged   reassurance provided   thoughts/feelings acknowledged  Goal: Readiness for Transition of Care  Outcome: Ongoing, Progressing     Problem: Bowel Elimination Impaired (Surgery Nonspecified)  Goal: Effective Bowel Elimination  Outcome: Ongoing, Progressing     Problem: Ongoing Anesthesia Effects (Surgery Nonspecified)  Goal: Anesthesia/Sedation Recovery  Outcome: Ongoing, Progressing  Intervention: Optimize Anesthesia Recovery  Description: Maintain patent airway; position to minimize risk of obstruction and aspiration.  Monitor respiratory status for signs of hypoventilation; provide oxygen therapy judiciously if hypoxemia present.  Monitor level of consciousness and response to verbal and physical stimulation; protect areas of decreased sensation (e.g., anatomical positioning, heat and cold avoidance, medical device or object positioning).  Individualize frequency and intensity of monitoring based on sedation or anesthesia administered, patient risk factors, ongoing assessment and organizational protocol.  Optimize fluid balance; anticipate need for fluid resuscitation.  Prepare for administration of pharmacologic therapy to manage sedation or anesthesia effects (e.g., antiemetic, reversal agent).  Consider risk for alterations in behavior or cognition; offer reassurance; answer questions.  Adjust environment to maintain safety (e.g., fall precautions, safety equipment).  Recent Flowsheet Documentation  Taken 12/11/2020 0039 by Nano Kelly, RN  Safety Promotion/Fall Prevention:   activity supervised   assistive device/personal items within reach   clutter free environment maintained   fall prevention program maintained   safety round/check completed   room organization consistent   lighting adjusted  Taken 12/10/2020 2114 by Nano Kelly, RN  Safety  Promotion/Fall Prevention:   activity supervised   assistive device/personal items within reach   clutter free environment maintained   fall prevention program maintained   lighting adjusted   safety round/check completed   room organization consistent  Taken 12/10/2020 1940 by Nano Kelly, RN  Safety Promotion/Fall Prevention:   activity supervised   assistive device/personal items within reach   fall prevention program maintained   lighting adjusted   clutter free environment maintained   room organization consistent   safety round/check completed     Problem: Pain (Surgery Nonspecified)  Goal: Acceptable Pain Control  Outcome: Ongoing, Progressing  Intervention: Prevent or Manage Pain  Description: Develop mutual pain management plan with patient and caregiver; review regularly.  Consider the presence and impact of preexisting chronic pain.  Encourage patient and caregiver involvement in pain assessment, interventions and safety measures.  Individualize pharmacologic pain management plan; titrate medication to patient response.  Combine multimodal analgesia and nonpharmacologic strategies to help potentiate synergistic effects and enhance comfort (e.g., complementary therapy, diversional activity).  Offer around-the-clock dosing of pain medication to keep pain levels in control.  Manage medication-induced effects, such as constipation, nausea, vomiting.  Support and optimize psychosocial response to pain.  Recent Flowsheet Documentation  Taken 12/10/2020 2114 by Nano Kelly, RN  Pain Management Interventions:   care clustered   diversional activity provided   pillow support provided   position adjusted   see MAR  Diversional Activities:   smartphone   television     Problem: Skin Injury Risk Increased  Goal: Skin Health and Integrity  Outcome: Ongoing, Progressing  Intervention: Optimize Skin Protection  Description: Reassess skin injury risk and inspect skin frequently (e.g., scheduled interval, with  turning, with change in condition) to provide optimal prevention.  Maintain adequate tissue perfusion (e.g., encourage fluid balance, avoid crossing legs, constrictive clothing or devices) to promote tissue oxygenation.  Maintain head of bed at lowest degree of elevation tolerated, considering medical condition and other restrictions. Limit amount of time head of bed is elevated greater than 30 degrees to prevent friction/shear injury.  Avoid positioning onto an area that remains reddened.  Minimize incontinence and moisture (e.g., toileting schedule; moisture-wicking pad, diaper or incontinence collection device, skin moisture barrier).  Cleanse skin promptly and gently when soiled utilizing a pH-balanced cleanser.  Relieve and redistribute pressure (e.g., schedule position changes; utilize higher specification foam mattress, chair cushion, constant low-pressure or alternating-pressure support surface; medical device repositioning; protective dressing applicatio  Support increased progressive functional activity (e.g., therapeutic exercise) to decrease risk associated with immobility. Balance rest with activity.  Recent Flowsheet Documentation  Taken 12/11/2020 0039 by Nano Kelly RN  Head of Bed (HOB): HOB at 15 degrees  Taken 12/10/2020 2114 by Nano Kelly RN  Pressure Reduction Techniques:   frequent weight shift encouraged   weight shift assistance provided   sit time limited to 2 hours   rest period provided between sit times   pressure points protected   heels elevated off bed   positioned off wounds  Head of Bed (HOB): HOB at 15 degrees  Pressure Reduction Devices: alternating pressure pump (ADD)  Skin Protection: adhesive use limited  Taken 12/10/2020 1940 by Nano Kelly RN  Head of Bed (HOB): HOB at 15 degrees     Problem: Fall Injury Risk  Goal: Absence of Fall and Fall-Related Injury  Outcome: Ongoing, Progressing  Intervention: Identify and Manage Contributors to Fall Injury  Risk  Description: Reassess fall risk frequently and with change in status or transfer to another level of care.  Communicate fall injury risk to all healthcare team members (e.g., rounds, change of shift/provider, patient transport).  Anticipate needs; perform regular intentional rounding to assess need for position change, pain assessment, personal needs (e.g., toileting) and placement of necessary items.  Provide reorientation, appropriate sensory stimulation and routines with changes in mental status to decrease risk of fall.  Promote use of personal vision and auditory aids (e.g., glasses, hearing aids).  Assess assistance level required for safe and effective care; provide support as needed (e.g., toileting, bathing, mobilization).  Define behavior and activity limits to patient and family.  If fall occurs, assess for and treat injury; determine cause; revise fall injury prevention plan.  Regularly review medication contribution to fall risk; adjust medication administration times to minimize risk of falling.  Consider risk related to polypharmacy and age.  Balance adequate pain management with potential for oversedation.  Recent Flowsheet Documentation  Taken 12/10/2020 2114 by Nano Kelly, RN  Medication Review/Management: medications reviewed  Intervention: Promote Injury-Free Environment  Description: Provide a safe, barrier-free environment that encourages independent activity.  Keep care area uncluttered and well-lighted.  Determine need for increased observation or auditory alerts (e.g., bed or chair alarm).  Assess equipment and environmental modification needs (e.g., low bed, signage, nonskid footwear, grab bars).  Avoid use of restraints.  Recent Flowsheet Documentation  Taken 12/11/2020 0039 by Nano Kelly, RN  Safety Promotion/Fall Prevention:   activity supervised   assistive device/personal items within reach   clutter free environment maintained   fall prevention program maintained    safety round/check completed   room organization consistent   lighting adjusted  Taken 12/10/2020 2114 by Nano Kelly, RN  Safety Promotion/Fall Prevention:   activity supervised   assistive device/personal items within reach   clutter free environment maintained   fall prevention program maintained   lighting adjusted   safety round/check completed   room organization consistent  Taken 12/10/2020 1940 by Nano Kelly, RN  Safety Promotion/Fall Prevention:   activity supervised   assistive device/personal items within reach   fall prevention program maintained   lighting adjusted   clutter free environment maintained   room organization consistent   safety round/check completed     Problem: Pain Acute  Goal: Optimal Pain Control  Outcome: Ongoing, Progressing  Intervention: Develop Pain Management Plan  Description: Acknowledge patient as the expert in pain self-management.  Use a consistent, validated tool for pain assessment.  Set pain management goals; determine acceptable level of discomfort to allow for maximal functioning.  Determine [id number] pain management plan, including both pharmacologic and nonpharmacologic measures; integrate management of chronic (persistent) pain.  Identify and integrate past successful measures, if able.  Encourage patient and caregiver involvement in pain assessment, interventions and safety measures.  Re-evaluate plan regularly.  Recent Flowsheet Documentation  Taken 12/10/2020 2114 by Nano Kelly, RN  Pain Management Interventions:   care clustered   diversional activity provided   pillow support provided   position adjusted   see MAR  Intervention: Optimize Psychosocial Wellbeing  Description: Facilitate patient’s self-control over pain by providing pain information and allowing choices in treatment.  Consider and address emotional response to pain.  Explore and promote use of coping strategies; address barriers to successful coping.  Evaluate and assist with  psychosocial, cultural and spiritual factors impacting pain.  Modify pain perception by using cognitive-behavioral techniques such as distraction, guided imagery, meditation, relaxation or music.  Recent Flowsheet Documentation  Taken 12/10/2020 2114 by Nano Kelly RN  Supportive Measures:   active listening utilized   positive reinforcement provided  Diversional Activities:   smartphone   television   Goal Outcome Evaluation:  Plan of Care Reviewed With: patient  Progress: no change  Outcome Summary: VITALS AS DOCUMENTED. WOUND VAC @ 76 CONTINUOUS. MINIMAL SS DRAINAGE UNDER TEGADERM TO LEFT THIGH SKIN GRAFT SITE. REPORTS RIGHT EYE BEING LESS RED AND LESS PAINFUL TODAY. PAIN MEDICATION PRN. WILL MONITOR.

## 2020-12-11 NOTE — PROGRESS NOTES
Continued Stay Note  [facility]     Patient Name: Ray London  MRN: [id number]  Today's Date: 12/11/2020    Admit Date: 12/8/2020    Discharge Plan     Row Name 12/11/20 1618       Plan    Plan  Plans OR home with assist of spouse and [facility].    Patient/Family in Agreement with Plan  yes    Plan Comments  Discussed case with Douglas/RN. States patient's wife to come to unit to learn dressing changes. Spoke with patient at bedside and wife per phone regarding home health selection. Requested [facility] . [facility]. Await approval/acceptance. No additional needs noted. Family to transport per private auto. Continue to follow.        Discharge Codes    No documentation.             Obdulia Ferrell RN

## 2020-12-11 NOTE — DISCHARGE PLACEMENT REQUEST
"Ray London (45 y.o. Male)     Date of Birth Social Security Number Address Home Phone MRN    1975  [address]  [place] [address] [phone] [id number]    Mandaeism Marital Status          Church        Admission Date Admission Type Admitting Provider Attending Provider Department, Room/Bed    12/8/20 Elective Terry Conteh MD Witten, Jonathan L, MD [address], P387/1    Discharge Date Discharge Disposition Discharge Destination                       Attending Provider: Terry Conteh MD    Allergies: No Known Allergies    Isolation: None   Infection: None   Code Status: CPR    Ht: 182.9 cm (72\")   Wt: 174 kg (384 lb 3 oz)    Admission Cmt: None   Principal Problem: None                Active Insurance as of 12/8/2020     Primary Coverage     Payor Plan Insurance Group Employer/Plan Group    [employer]HEM BLUE CROSS [employer]UE [employer]OSS BLUE [employer]IELD PPO [id number]     Payor Plan Address Payor Plan Phone Number Payor Plan Fax Number Effective Dates    [address] [phone]  12/1/2020 - None Entered    [place] [address]       Subscriber Name Subscriber Birth Date Member ID       BOWEN LONDON 11/11/1989 [id number]                 Emergency Contacts      (Rel.) Home Phone Work Phone Mobile Phone    AntoniosauloBowen (Spouse) [phone] -- [phone]            Emergency Contact Information     Name Relation Home Work Mobile    Bowen London Spouse [phone]          Insurance Information                [employer]HEM BLUE CROSS/[employer]HEM [employer]UE [employer]OSS BLUE SHIELD PPO Phone: [phone]    Subscriber: Bowen London Subscriber#: [id number]    Group#: [id number] Precert#:           "

## 2020-12-11 NOTE — PROGRESS NOTES
"  FIRST UROLOGY DAILY PROGRESS NOTE    Patient Identification  Name: Ray London  Age: 45 y.o.  Sex: male  :  1975  MRN: [id number]    Date: 2020             Subjective:  Interval History:   Doing well  Pain controlled    Objective:    Scheduled Meds:amLODIPine, 5 mg, Oral, Nightly  enoxaparin, 40 mg, Subcutaneous, Daily  erythromycin, , Both Eyes, Q12H  escitalopram, 20 mg, Oral, Nightly  folic acid, 1,000 mcg, Oral, Daily  hydroCHLOROthiazide Oral, 12.5 mg, Oral, Daily  insulin glargine, 5 Units, Subcutaneous, QAM  insulin lispro, 2 Units, Subcutaneous, TID With Meals  metFORMIN, 1,000 mg, Oral, BID With Meals  pantoprazole, 40 mg, Oral, Q AM  rosuvastatin, 20 mg, Oral, Nightly  sennosides-docusate, 2 tablet, Oral, Nightly  sodium chloride, 3 mL, Intravenous, Q12H  tobramycin-dexamethasone, 1 drop, Right Eye, Q2H While Awake  valsartan, 160 mg, Oral, Q24H      Continuous Infusions:lactated ringers, 9 mL/hr, Last Rate: Stopped (20 1541)      PRN Meds:•  acetaminophen **OR** acetaminophen  •  diphenhydrAMINE  •  Glycerin-Hypromellose-  •  HYDROmorphone **AND** naloxone  •  ketorolac  •  ondansetron **OR** ondansetron  •  oxyCODONE  •  sodium chloride    Vital signs in last 24 hours:  Temp:  [97.3 °F (36.3 °C)-100 °F (37.8 °C)] 100 °F (37.8 °C)  Heart Rate:  [68-83] 72  Resp:  [16-18] 18  BP: (112-126)/(70-83) 125/74    Intake/Output:    Intake/Output Summary (Last 24 hours) at 2020 0651  Last data filed at 2020 0326  Gross per 24 hour   Intake 240 ml   Output 3675 ml   Net -3435 ml       Exam:  /74 (BP Location: Right arm, Patient Position: Lying)   Pulse 72   Temp 100 °F (37.8 °C) (Oral)   Resp 18   Ht 182.9 cm (72\")   Wt (!) 174 kg (384 lb 3 oz)   SpO2 95%   BMI 52.11 kg/m²     General Appearance:    Alert, cooperative, no distress, appears stated age   Back:     Symmetric, no CVA tenderness   Lungs:     Clear to auscultation bilaterally, respirations " unlabored   Heart:    Regular rate and rhythm, strong peripheral pulses   Abdomen:    Obese, Soft   Genitalia:   Wound vac in place, holding suction   Extremities:   Extremities normal, atraumatic, no cyanosis or edema   Skin:   Skin color, texture, turgor normal, no rashes or lesions        Data Review:  All labs (24hrs):   Recent Results (from the past 24 hour(s))   POC Glucose Once    Collection Time: 12/10/20  7:36 AM    Specimen: Blood   Result Value Ref Range    Glucose 140 (H) 70 - 130 mg/dL   POC Glucose Once    Collection Time: 12/10/20 11:37 AM    Specimen: Blood   Result Value Ref Range    Glucose 148 (H) 70 - 130 mg/dL   POC Glucose Once    Collection Time: 12/10/20  4:19 PM    Specimen: Blood   Result Value Ref Range    Glucose 151 (H) 70 - 130 mg/dL   POC Glucose Once    Collection Time: 12/10/20  8:30 PM    Specimen: Blood   Result Value Ref Range    Glucose 182 (H) 70 - 130 mg/dL        Assessment:    Acquired buried penis    Morbid obesity (CMS/HCC)    Type 2 diabetes mellitus with hyperglycemia (CMS/HCC)    Essential hypertension, benign    Buried penis  S/p Buried penis repair with split thickness skin graft on 12/8/2020    Plan:    - Wound vac removal tonight by Dr. Terry Conteh  - plan for dressing changes/teaching Saturday-Sunday  - Anticipate Discharge Sunday    Terry Conteh MD  12/11/2020  06:51 EST

## 2020-12-11 NOTE — PLAN OF CARE
Goal Outcome Evaluation:  Plan of Care Reviewed With: patient  Progress: improving  Outcome Summary: pt doing well today. wound vac remains at 75, dr. dunne to come to floor to change dressing tonight. wife approved to come in for dressing changes tomorrow.plan to d/c sunday? pain well controlled. pt had large BM on bedpan this afternoon and feels much better. VSS will continue to monitor.

## 2020-12-12 LAB
GLUCOSE BLDC GLUCOMTR-MCNC: 129 MG/DL (ref 70–130)
GLUCOSE BLDC GLUCOMTR-MCNC: 148 MG/DL (ref 70–130)
GLUCOSE BLDC GLUCOMTR-MCNC: 156 MG/DL (ref 70–130)
GLUCOSE BLDC GLUCOMTR-MCNC: 205 MG/DL (ref 70–130)

## 2020-12-12 PROCEDURE — 25010000002 ENOXAPARIN PER 10 MG: Performed by: UROLOGY

## 2020-12-12 PROCEDURE — 82962 GLUCOSE BLOOD TEST: CPT

## 2020-12-12 PROCEDURE — 63710000001 INSULIN LISPRO (HUMAN) PER 5 UNITS: Performed by: INTERNAL MEDICINE

## 2020-12-12 PROCEDURE — 63710000001 INSULIN GLARGINE PER 5 UNITS: Performed by: INTERNAL MEDICINE

## 2020-12-12 PROCEDURE — 25010000002 HYDROMORPHONE PER 4 MG: Performed by: UROLOGY

## 2020-12-12 RX ADMIN — AMLODIPINE BESYLATE 5 MG: 5 TABLET ORAL at 21:56

## 2020-12-12 RX ADMIN — SODIUM CHLORIDE, PRESERVATIVE FREE 3 ML: 5 INJECTION INTRAVENOUS at 08:04

## 2020-12-12 RX ADMIN — HYDROCHLOROTHIAZIDE 12.5 MG: 12.5 CAPSULE ORAL at 08:04

## 2020-12-12 RX ADMIN — TOBRAMYCIN AND DEXAMETHASONE 1 DROP: 3; 1 SUSPENSION/ DROPS OPHTHALMIC at 22:13

## 2020-12-12 RX ADMIN — OXYCODONE HYDROCHLORIDE 5 MG: 5 TABLET ORAL at 02:13

## 2020-12-12 RX ADMIN — METFORMIN HYDROCHLORIDE 1000 MG: 1000 TABLET ORAL at 21:57

## 2020-12-12 RX ADMIN — VALSARTAN 160 MG: 160 TABLET, FILM COATED ORAL at 08:04

## 2020-12-12 RX ADMIN — SODIUM CHLORIDE, PRESERVATIVE FREE 3 ML: 5 INJECTION INTRAVENOUS at 21:56

## 2020-12-12 RX ADMIN — TOBRAMYCIN AND DEXAMETHASONE 1 DROP: 3; 1 SUSPENSION/ DROPS OPHTHALMIC at 06:22

## 2020-12-12 RX ADMIN — ERYTHROMYCIN 1 APPLICATION: 5 OINTMENT OPHTHALMIC at 08:04

## 2020-12-12 RX ADMIN — INSULIN LISPRO 2 UNITS: 100 INJECTION, SOLUTION INTRAVENOUS; SUBCUTANEOUS at 12:44

## 2020-12-12 RX ADMIN — TOBRAMYCIN AND DEXAMETHASONE 1 DROP: 3; 1 SUSPENSION/ DROPS OPHTHALMIC at 02:13

## 2020-12-12 RX ADMIN — OXYCODONE HYDROCHLORIDE 5 MG: 5 TABLET ORAL at 22:06

## 2020-12-12 RX ADMIN — HYDROMORPHONE HYDROCHLORIDE 0.5 MG: 1 INJECTION, SOLUTION INTRAMUSCULAR; INTRAVENOUS; SUBCUTANEOUS at 10:19

## 2020-12-12 RX ADMIN — TOBRAMYCIN AND DEXAMETHASONE 1 DROP: 3; 1 SUSPENSION/ DROPS OPHTHALMIC at 14:57

## 2020-12-12 RX ADMIN — PANTOPRAZOLE SODIUM 40 MG: 40 TABLET, DELAYED RELEASE ORAL at 06:22

## 2020-12-12 RX ADMIN — METFORMIN HYDROCHLORIDE 1000 MG: 1000 TABLET ORAL at 08:04

## 2020-12-12 RX ADMIN — ROSUVASTATIN CALCIUM 20 MG: 20 TABLET, FILM COATED ORAL at 21:56

## 2020-12-12 RX ADMIN — TOBRAMYCIN AND DEXAMETHASONE 1 DROP: 3; 1 SUSPENSION/ DROPS OPHTHALMIC at 12:45

## 2020-12-12 RX ADMIN — ENOXAPARIN SODIUM 40 MG: 40 INJECTION SUBCUTANEOUS at 08:03

## 2020-12-12 RX ADMIN — DOCUSATE SODIUM 50MG AND SENNOSIDES 8.6MG 2 TABLET: 8.6; 5 TABLET, FILM COATED ORAL at 21:56

## 2020-12-12 RX ADMIN — FOLIC ACID 1000 MCG: 1 TABLET ORAL at 21:56

## 2020-12-12 RX ADMIN — SODIUM CHLORIDE, PRESERVATIVE FREE 3 ML: 5 INJECTION INTRAVENOUS at 02:15

## 2020-12-12 RX ADMIN — TOBRAMYCIN AND DEXAMETHASONE 1 DROP: 3; 1 SUSPENSION/ DROPS OPHTHALMIC at 08:05

## 2020-12-12 RX ADMIN — INSULIN LISPRO 2 UNITS: 100 INJECTION, SOLUTION INTRAVENOUS; SUBCUTANEOUS at 08:03

## 2020-12-12 RX ADMIN — HYDROMORPHONE HYDROCHLORIDE 0.5 MG: 1 INJECTION, SOLUTION INTRAMUSCULAR; INTRAVENOUS; SUBCUTANEOUS at 19:52

## 2020-12-12 RX ADMIN — INSULIN LISPRO 2 UNITS: 100 INJECTION, SOLUTION INTRAVENOUS; SUBCUTANEOUS at 18:37

## 2020-12-12 RX ADMIN — TOBRAMYCIN AND DEXAMETHASONE 1 DROP: 3; 1 SUSPENSION/ DROPS OPHTHALMIC at 16:21

## 2020-12-12 RX ADMIN — INSULIN GLARGINE 5 UNITS: 100 INJECTION, SOLUTION SUBCUTANEOUS at 06:21

## 2020-12-12 RX ADMIN — HYDROMORPHONE HYDROCHLORIDE 0.5 MG: 1 INJECTION, SOLUTION INTRAMUSCULAR; INTRAVENOUS; SUBCUTANEOUS at 13:50

## 2020-12-12 RX ADMIN — TOBRAMYCIN AND DEXAMETHASONE 1 DROP: 3; 1 SUSPENSION/ DROPS OPHTHALMIC at 18:37

## 2020-12-12 RX ADMIN — OXYCODONE HYDROCHLORIDE 5 MG: 5 TABLET ORAL at 13:51

## 2020-12-12 RX ADMIN — ESCITALOPRAM 20 MG: 20 TABLET, FILM COATED ORAL at 21:56

## 2020-12-12 NOTE — PROGRESS NOTES
[facility]     LOS: 2 days     Subjective:    CC:No chief complaint on file.    DIET:  Diet Order   Procedures   • Diet Regular; Consistent Carbohydrate     Severe right eye pain which is now improving  No soa  No cp  Patient's wound pain is reasonably controlled      Objective:    Vital Signs:  Temp:  [97.3 °F (36.3 °C)-98.4 °F (36.9 °C)] 98.4 °F (36.9 °C)  Heart Rate:  [65-82] 82  Resp:  [16-18] 16  BP: (131-147)/(85-93) 132/85    SpO2:  [94 %-96 %] 95 %  on   ;   Device (Oxygen Therapy): room air  Body mass index is 52.11 kg/m².    Physical Exam  Constitutional:       Appearance: He is well-developed.   HENT:      Head: Normocephalic and atraumatic.   Eyes:      General:         Right eye: No discharge.         Left eye: No discharge.      Conjunctiva/sclera: Conjunctivae normal.      Pupils: Pupils are equal, round, and reactive to light.   Cardiovascular:      Rate and Rhythm: Normal rate and regular rhythm.      Heart sounds: No murmur. No friction rub.   Pulmonary:      Effort: Pulmonary effort is normal.      Breath sounds: Normal breath sounds.   Abdominal:      General: Bowel sounds are normal. There is no distension.      Palpations: Abdomen is soft.      Tenderness: There is no abdominal tenderness.   Skin:     General: Skin is warm and dry.   Neurological:      General: No focal deficit present.      Mental Status: He is alert.   Psychiatric:         Mood and Affect: Mood normal.         Behavior: Behavior normal.         Results Review:    No results found for: GLUCOSE  Results from last 7 days   Lab Units 12/09/20  0802   WBC 10*3/mm3 11.87*   HEMOGLOBIN g/dL 15.0   HEMATOCRIT % 45.0   PLATELETS 10*3/mm3 205     Results from last 7 days   Lab Units 12/09/20  0802   SODIUM mmol/L 133*   POTASSIUM mmol/L 4.1   CHLORIDE mmol/L 98   CO2 mmol/L 24.9   BUN mg/dL 16   CREATININE mg/dL 1.02   CALCIUM mg/dL 8.4*   GLUCOSE mg/dL 174*         Results from last 7 days   Lab Units  12/09/20  0802   MAGNESIUM mg/dL 2.1         Cultures:  No results found for: BLOODCX, URINECX, WOUNDCX, MRSACX, RESPCX, STOOLCX    I have reviewed daily medications and changes in CPOE    Scheduled meds  amLODIPine, 5 mg, Oral, Nightly  enoxaparin, 40 mg, Subcutaneous, Daily  erythromycin, , Both Eyes, Q12H  escitalopram, 20 mg, Oral, Nightly  folic acid, 1,000 mcg, Oral, Daily  hydroCHLOROthiazide Oral, 12.5 mg, Oral, Daily  insulin lispro, 2 Units, Subcutaneous, TID With Meals  metFORMIN, 1,000 mg, Oral, BID With Meals  pantoprazole, 40 mg, Oral, Q AM  rosuvastatin, 20 mg, Oral, Nightly  sennosides-docusate, 2 tablet, Oral, Nightly  sodium chloride, 3 mL, Intravenous, Q12H  tobramycin-dexamethasone, 1 drop, Right Eye, Q2H While Awake  valsartan, 160 mg, Oral, Q24H        lactated ringers, 9 mL/hr, Last Rate: Stopped (12/08/20 1541)      PRN meds  •  acetaminophen **OR** acetaminophen  •  diphenhydrAMINE  •  Glycerin-Hypromellose-  •  HYDROmorphone **AND** naloxone  •  ketorolac  •  ondansetron **OR** ondansetron  •  oxyCODONE  •  sodium chloride        Acquired buried penis    Morbid obesity (CMS/[facility])    Type 2 diabetes mellitus with hyperglycemia (CMS/[facility])    Essential hypertension, benign        Assessment/Plan:  Brief Hospital Course to date:  Ray London is a 45 y.o. male with past medical history of morbid obesity who was hospitalized after surgery for acquired buried penis, and received surgical intervention on 12/8 for buried penis release.  Hospital medicine has been consulted to assist with management of diabetes and essential hypertension and it appears patient has accidentally scratched his eye postoperatively and may have corneal abrasion.  For which ophthalmology was called and they have started the patient on TobraDex drops     Buried penis:  Management per urology.  Status post surgical intervention on 12/8     Possible corneal abrasion:  TobraDex drops     Consult ophthalmology has  placed orders for drops     Essential hypertension:  Blood pressures are doing well, valsartan and hydrochlorothiazide.     Type 2 diabetes mellitus:  Prandial and basal insulin added to correction scale.  Monitor glucose and adjust further as needed.  -I have advised the patient to stop his Jardiance until peritoneal wound has healed secondary to slight increased risk for perineal infection     Morbid obesity: Weight loss recommended.  Supportive care.     DVT Prophylaxis:  LVX        Asher Page MD  12/12/20  17:21 EST

## 2020-12-12 NOTE — PROGRESS NOTES
CC: I am ok    Patient resting in bed  NAD  A&O x 3  Left thigh dressing intact, VAC off.  Penile dressing intact, urinating spontaneously,.no dysuria.  Dressing change to be done with wife later this morning.     AVSS  WBC 11.8  Hgb 15  Cr 1.05    Plan:  Ambulate  Dressing change with wife today, per instructions left by Dr. Conteh.  Possible discharge home on Sunday

## 2020-12-12 NOTE — PLAN OF CARE
"  Problem: Adult Inpatient Plan of Care  Goal: Plan of Care Review  Outcome: Ongoing, Progressing  Flowsheets  Taken 2020 0406 by Mayela Engel RN  Progress: improving  Outcome Summary:     Pt c/o burning pain to penis, rates at a 2. Has voided post hidalgo removal. Mesh underwear securing drsg/penis cloud. Dilaudid x1 for drsg change, then Nan. Wife approved to come witness drsg change today.     Will pass on Dr Reynoso instructions for wound care.    Penis Dressin. Remove dressing gently  2. Apply thin film of Bacitracin ointment to shaft of penis  3. Wrap Curad Oil Emulsion Dressing (3x8in) gently around shaft of penis once without any tightness.  4. Wrap Medline Conforming Stretch Gauze Bandage around shaft of penis 3 times without minimal tightness.  5. Wrap penis with Coban dressing around shaft of penis (3M Coban Self-Adherent Wrap 3\" x 5yards) 3 times, careful not to make it too tight6.   Apply \"Penis Cloud Dressing\" - Bolster penis with fluffed Kerlix gauze roll ([employer] 3324 Kerlix Bandage Roll, 100% coton, 6-ply 41/2\" x 4 1/8 yard) - do not wrap penis -   Apply Mesh underwear (Mesh high Waist knit underwear) to secure dressing in place    "

## 2020-12-12 NOTE — PROGRESS NOTES
"First Urology Update  2020  21:21 EST      Wound vac removed 2020, 9 pm    Plan for twice daily Dressing changes (and as needed)  Okay for showers 1-2 times daily  Anticipate Discharge  Morning     Buried Penis Dressing Instructions:      Penis Dressin. Remove dressing gently  2. Apply thin film of Bacitracin ointment to shaft of penis  3. Wrap Curad Oil Emulsion Dressing (3x8in) gently around shaft of penis once without any tightness.  4. Wrap Medline Conforming Stretch Gauze Bandage around shaft of penis 3 times without minimal tightness.  5. Wrap penis with Coban dressing around shaft of penis (3M Coban Self-Adherent Wrap 3\" x 5yards) 3 times, careful not to make it too tight  6. Apply \"Penis Denali Dressing\"   - Bolster penis with fluffed Kerlix gauze roll ([employer] 3324 Kerlix Bandage Roll, 100% coton, 6-ply 41/2\" x 4 1/8 yard) - do not wrap penis   - Apply Mesh underwear (Mesh high Waist knit underwear) to secure dressing in place    Thigh Dressin. Remove previous dressing, gently  2. Re-apply dry gauze (4x4\" or similar) to thigh skin graft harvest site  3. Apply Abd Pad (Medline Sterile Abdominal pad 5inch x 9 inch) and secure to leg with Paper Tape      Where to get supplies    1. Bacitracin ointment - Prescription given to you by Dr. Conteh    2. Curad Oil Emulsion Dressing (3x8in) - can be purchased online at [employer] for about $21 for a Box of 24    3. Medline Conforming Stretch Gauze Bandage (Flex-Trek Premium 36-Pack 3 inch Conforming stretch gauze bandage Rolls) - Can be purchased online at [employer] for $13 for  36-pack of 3 inch bandage rolls    4. Coban dressing (3M Coban Self-Adherent Wrap 3\" x 5yards) - Prescription given to you by Dr. Conteh - Can be purchased online at [employer] for $20-$40     5.   Kerlix Gauze Roll ([employer] 3324 Kerlix Bandage Roll, 100% cotton, 6-ply 41/2\" x 4 1/8 yard) - Can be purchased online at [employer] for #12    6.   Mesh underwear ( " 5 pack Mesh high Waist knit underwear) - can be purchased online at [employer] for $13    7.   Dry gauze - can be found at any pharmacy    8.   Medline Sterile Abdominal pad 5inch x 9 inch, 2 packs of 25 count - can be purchased online at [employer] for $15            Terry Conteh MD  [facility]  General & Reconstructive Urology  Office: [phone]  Fax: [phone]

## 2020-12-13 ENCOUNTER — READMISSION MANAGEMENT (OUTPATIENT)
Dept: CALL CENTER | Facility: HOSPITAL | Age: 45
End: 2020-12-13

## 2020-12-13 VITALS
HEART RATE: 64 BPM | WEIGHT: 315 LBS | TEMPERATURE: 96.7 F | RESPIRATION RATE: 16 BRPM | OXYGEN SATURATION: 97 % | BODY MASS INDEX: 42.66 KG/M2 | DIASTOLIC BLOOD PRESSURE: 81 MMHG | HEIGHT: 72 IN | SYSTOLIC BLOOD PRESSURE: 123 MMHG

## 2020-12-13 LAB — GLUCOSE BLDC GLUCOMTR-MCNC: 158 MG/DL (ref 70–130)

## 2020-12-13 PROCEDURE — 82962 GLUCOSE BLOOD TEST: CPT

## 2020-12-13 PROCEDURE — 25010000002 HYDROMORPHONE PER 4 MG: Performed by: UROLOGY

## 2020-12-13 PROCEDURE — 25010000002 ENOXAPARIN PER 10 MG: Performed by: UROLOGY

## 2020-12-13 PROCEDURE — 63710000001 INSULIN LISPRO (HUMAN) PER 5 UNITS: Performed by: INTERNAL MEDICINE

## 2020-12-13 RX ADMIN — PANTOPRAZOLE SODIUM 40 MG: 40 TABLET, DELAYED RELEASE ORAL at 06:25

## 2020-12-13 RX ADMIN — HYDROMORPHONE HYDROCHLORIDE 0.5 MG: 1 INJECTION, SOLUTION INTRAMUSCULAR; INTRAVENOUS; SUBCUTANEOUS at 06:32

## 2020-12-13 RX ADMIN — SODIUM CHLORIDE, PRESERVATIVE FREE 3 ML: 5 INJECTION INTRAVENOUS at 08:05

## 2020-12-13 RX ADMIN — METFORMIN HYDROCHLORIDE 1000 MG: 1000 TABLET ORAL at 08:06

## 2020-12-13 RX ADMIN — TOBRAMYCIN AND DEXAMETHASONE 1 DROP: 3; 1 SUSPENSION/ DROPS OPHTHALMIC at 06:26

## 2020-12-13 RX ADMIN — INSULIN LISPRO 2 UNITS: 100 INJECTION, SOLUTION INTRAVENOUS; SUBCUTANEOUS at 08:04

## 2020-12-13 RX ADMIN — HYDROCHLOROTHIAZIDE 12.5 MG: 12.5 CAPSULE ORAL at 08:05

## 2020-12-13 RX ADMIN — OXYCODONE HYDROCHLORIDE 5 MG: 5 TABLET ORAL at 09:40

## 2020-12-13 RX ADMIN — ENOXAPARIN SODIUM 40 MG: 40 INJECTION SUBCUTANEOUS at 08:04

## 2020-12-13 RX ADMIN — TOBRAMYCIN AND DEXAMETHASONE 1 DROP: 3; 1 SUSPENSION/ DROPS OPHTHALMIC at 08:05

## 2020-12-13 RX ADMIN — HYDROMORPHONE HYDROCHLORIDE 0.5 MG: 1 INJECTION, SOLUTION INTRAMUSCULAR; INTRAVENOUS; SUBCUTANEOUS at 09:40

## 2020-12-13 RX ADMIN — VALSARTAN 160 MG: 160 TABLET, FILM COATED ORAL at 08:05

## 2020-12-13 NOTE — DISCHARGE SUMMARY
Urology Discharge Note    Name:  Ray London  Age:  45 y.o.  Sex:  male  :  1975  MRN:  [id number]    Date of Admission: 2020   Date of Discharge:  2020    Admitting Diagnosis: Buried Penis   Discharge Diagnosis: Buried penis s/p repair, scrotoplasty and skin graft     Presenting Problem  Active Hospital Problems    Diagnosis  POA   • Morbid obesity (CMS/[facility]) [E66.01]  Yes   • Type 2 diabetes mellitus with hyperglycemia (CMS/[facility]) [E11.65]  Yes   • Essential hypertension, benign [I10]  Yes   • 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         Hospital Course  Patient is a 45 y.o. male presented with a buried penis and underwent a repair, scrotoplasty and skin graft.  He is voiding spontaneously and taking PO. He is able to ambulate. His wife has participated in his dressing change and is to do BID changes upon discharge. He is afebrile and VSS. .      Procedures Performed    Procedure(s):  BURIED PENIS REPAIR, SPLIT THICKNESS SKIN GRAFT, CYSTOSCOPY, SCROTOPLASTY, APPLICATION OF NEGATIVE PRESSURE DRESSING  -------------------       Consults:   Consults     Date and Time Order Name Status Description    2020 1006 Inpatient Ophthalmology Consult      2020 1338 Inpatient Hospitalist Consult Completed           Pertinent Test Results:   Lab Results (last 24 hours)     Procedure Component Value Units Date/Time    POC Glucose Once [[id number]]  (Abnormal) Collected: 20 0722    Specimen: Blood Updated: 20 0724     Glucose 158 mg/dL     POC Glucose Once [[id number]]  (Abnormal) Collected: 20    Specimen: Blood Updated: 20     Glucose 156 mg/dL     POC Glucose Once [[id number]]  (Abnormal) Collected: 20 1650    Specimen: Blood Updated: 20 1651     Glucose 205 mg/dL     POC Glucose Once [[id number]]  (Abnormal) Collected: 20 1143    Specimen: Blood Updated: 20 1159     Glucose 148  "mg/dL         Imaging Results (Last 72 Hours)     ** No results found for the last 72 hours. **          Condition on Discharge:  Stable    Vital Signs     Vitals:    12/12/20 1557 12/12/20 1945 12/13/20 0325 12/13/20 0719   BP: 132/85 129/84 146/95 123/81   BP Location: Left arm Left arm Left arm Left arm   Patient Position: Sitting Lying Lying Sitting   Pulse: 82 69 72 64   Resp: 16 20 18 16   Temp: 98.4 °F (36.9 °C) 97.9 °F (36.6 °C) 96.7 °F (35.9 °C) 96.7 °F (35.9 °C)   TempSrc: Oral Oral Oral Oral   SpO2: 95% 96% 93% 97%   Weight:       Height:           Physical Exam:   Vital signs: /81 (BP Location: Left arm, Patient Position: Sitting)   Pulse 64   Temp 96.7 °F (35.9 °C) (Oral)   Resp 16   Ht 182.9 cm (72\")   Wt (!) 174 kg (384 lb 3 oz)   SpO2 97%   BMI 52.11 kg/m²   97%     General: Well developed, well nourished, alert and oriented x 3    Appears stated age. No apparent distress.    HEENT: Moist mucous membranes of the oral mucosa & nasal mucosa.    Lips are not cyanotic.    Extraocular movements intact    Neck:  Trachea position is mid line.    Respiratory: Respiratory rhythm & depth: Normal.    Respiratory effort:  Normal.    GI:  Nondistended. Nontender.    Skin:  Right leg graft site with gauze dressing, blood tinged.        Extremities: No clubbing & no cyanosis.    No edema    :  Phallus with graft, no necrotic tissue, pink edges and yellow/white graft.  Minimal bloody drainage.        Discharge Disposition  Home or Self Care    Discharge Medications     Discharge Medications      New Medications      Instructions Start Date   acetaminophen 325 MG tablet  Commonly known as: Tylenol   650 mg, Oral, Every 4 Hours PRN      bacitracin 500 UNIT/GM ointment   Apply to affected area daily      cephalexin 500 MG capsule  Commonly known as: KEFLEX   500 mg, Oral, 2 Times Daily      docusate sodium 100 MG capsule  Commonly known as: Colace   100 mg, Oral, Daily PRN      HYDROcodone-acetaminophen " 5-325 MG per tablet  Commonly known as: NORCO   1 tablet, Oral, Every 6 Hours PRN         Changes to Medications      Instructions Start Date   amLODIPine 5 MG tablet  Commonly known as: NORVASC  What changed: when to take this   5 mg, Oral, Daily, For BP      [employer] D3 50 MCG (2000 UT) capsule  Generic drug: Cholecalciferol  What changed:   · how much to take  · when to take this   TAKE ONE CAPSULE BY MOUTH EVERY DAY      Dulaglutide 1.5 MG/0.5ML solution pen-injector  Commonly known as: Trulicity  What changed: additional instructions   1.5 mg, Subcutaneous, Weekly, Inject 1.5mg SC once weekly for DMII      Empagliflozin 25 MG tablet  Commonly known as: Jardiance  What changed: when to take this   25 mg, Oral, Daily, For DMII      escitalopram 20 MG tablet  Commonly known as: LEXAPRO  What changed:   · how much to take  · how to take this  · when to take this   ONE TAB DAILY FOR STRESS      omeprazole 20 MG capsule  Commonly known as: priLOSEC  What changed: when to take this   20 mg, Oral, Daily, For GERD      rosuvastatin 20 MG tablet  Commonly known as: Crestor  What changed: when to take this   20 mg, Oral, Daily, For cholesterol--new dose      valsartan-hydrochlorothiazide 160-12.5 MG per tablet  Commonly known as: DIOVAN-HCT  What changed: when to take this   1 tablet, Oral, Daily, for blood pressure.         Continue These Medications      Instructions Start Date   folic acid 1 MG tablet  Commonly known as: FOLVITE   1,000 mcg, Oral, Daily      metFORMIN 500 MG tablet  Commonly known as: Glucophage   1,000 mg, Oral, 2 Times Daily With Meals, For DMII      Stelara 90 MG/ML solution prefilled syringe Injection  Generic drug: Ustekinumab   Subcutaneous, Every 3 Months, DUE IN JANUARY         Stop These Medications    clobetasol 0.05 % ointment  Commonly known as: TEMOVATE            Discharge Diet: Regular     Activity at Discharge: No heavy lifting, no tub baths     Follow-up Appointments  No future  appointments.  Additional Instructions for the Follow-ups that You Need to Schedule     Discharge Follow-up with Specialty:   As directed      Follow Up Details: Follow-up With Ophthalmology if No Improvement in 1 Day             Prescriptions have been sent to your pharmacy by Dr. Conteh.   Please call if any concerns, [phone] and to confirm your follow up with Dr. Conteh.   Continue BID dressing changes.      Lyric Davenport, APRN  12/13/20  10:27 EST

## 2020-12-13 NOTE — OUTREACH NOTE
Prep Survey      Responses   [facility] patient discharged from?  [place]   Is LACE score < 7 ?  No   Eligibility  [facility]   Date of Admission  12/08/20   Date of Discharge  12/13/20   Discharge Disposition  Home or Self Care   Discharge diagnosis  Buried penis s/p repair, scrotoplasty and skin graft    Does the patient have one of the following disease processes/diagnoses(primary or secondary)?  Other   Does the patient have Home health ordered?  Yes   What is the Home health agency?   [facility]    Is there a DME ordered?  No   Prep survey completed?  Yes          Brenda Ribeiro RN

## 2020-12-13 NOTE — PROGRESS NOTES
CC: POD 5 penile repair and skin graft    NAD  Urinating spontaneously  Has had wife present for dressing change  Pain is managed  +PO  Eye pain is improving, on drops now  Cr 1.02  WBC 11.8  Hgb 15  AVSS    Penile site with no dark tissue, pink edges and white graft  Leg dressing changed this am with blood tinged gauze    Plan:  Discharge home today   Wife to continue to do BID dressing changes to penis and skin graft on leg.   Discussed with patient and he understands and is agreeable.

## 2020-12-13 NOTE — PLAN OF CARE
Goal Outcome Evaluation:  Plan of Care Reviewed With: patient  Progress: improving  Outcome Summary: Pt wife given instruction in dressing changes, plan for possible discharge tomorrow. Dressing to left thigh skin graft donor site changed as blood had filled behind tegaderm. Pt up to restroom, voiding per urinal. Will continue to monitor.

## 2020-12-14 ENCOUNTER — TRANSITIONAL CARE MANAGEMENT TELEPHONE ENCOUNTER (OUTPATIENT)
Dept: CALL CENTER | Facility: HOSPITAL | Age: 45
End: 2020-12-14

## 2020-12-14 NOTE — PAYOR COMM NOTE
"P: [phone]  F: [phone]    CONTINUED STAY REVIEW AND DC SUMMARY    REF #[id number]        Ray London (45 y.o. Male)     Date of Birth Social Security Number Address Home Phone MRN    1975  [address]  [place] [id number] [phone] [id number]    Mormonism Marital Status          [employer]        Admission Date Admission Type Admitting Provider Attending Provider Department, Room/Bed    12/8/20 Elective Terry Conteh MD  [address], P387/1    Discharge Date Discharge Disposition Discharge Destination        12/13/2020 Home or Self Care              Attending Provider: (none)   Allergies: No Known Allergies    Isolation: None   Infection: None   Code Status: Prior    Ht: 182.9 cm (72\")   Wt: 174 kg (384 lb 3 oz)    Admission Cmt: None   Principal Problem: None                Active Insurance as of 12/8/2020     Primary Coverage     Payor Plan Insurance Group Employer/Plan Group    [employer] [facility] [employer] [facility] PPO [id number]     Payor Plan Address Payor Plan Phone Number Payor Plan Fax Number Effective Dates    [address] [phone]  12/1/2020 - None Entered    [place] [address]       Subscriber Name Subscriber Birth Date Member ID       BOWEN LONDON 11/11/1989 [id number]                 Emergency Contacts      (Rel.) Home Phone Work Phone Mobile Phone    Bowen London (Spouse) [phone] -- [phone]            Vital Signs (last day) before discharge     Date/Time   Temp   Temp src   Pulse   Resp   BP   Patient Position   SpO2    12/13/20 1200   --   Oral   --   --   --   --   --    12/13/20 0719   96.7 (35.9)   Oral   64   16   123/81   Sitting   97    12/13/20 0325   96.7 (35.9)   Oral   72   18   146/95   Lying   93    12/12/20 1945   97.9 (36.6)   Oral   69   20   129/84   Lying   96    12/12/20 1557   98.4 (36.9)   Oral   82   16   132/85   Sitting   95    12/12/20 1140   97.3 " (36.3)   Oral   70   16   131/87   Lying   94    12/12/20 0733   98.4 (36.9)   Oral   65   16   139/91   Lying   94    12/12/20 0343   98.4 (36.9)   Oral   73   18   147/93   Lying   96              Oxygen Therapy (last day) before discharge     Date/Time   SpO2   Device (Oxygen Therapy)   Flow (L/min)   Oxygen Concentration (%)   ETCO2 (mmHg)    12/13/20 0800   --   room air   --   --   --    12/13/20 0719   97   room air   --   --   --    12/13/20 0325   93   room air   --   --   --    12/12/20 2158   --   room air   --   --   --    12/12/20 1945   96   room air   --   --   --    12/12/20 1557   95   room air   --   --   --    12/12/20 1140   94   room air   --   --   --    12/12/20 0733   94   room air   --   --   --    12/12/20 0343   96   room air   --   --   --              Lines, Drains & Airways    Active LDAs     None         Inactive LDAs     Name:   Placement date:   Placement time:   Removal date:   Removal time:   Site:   Days:    [REMOVED] Peripheral IV 12/08/20 0559 Posterior;Left Hand   12/08/20    0559    12/09/20    1100    Hand   1    [REMOVED] Peripheral IV 12/09/20 Left Forearm   12/09/20    --    12/13/20    1200    Forearm   4    [REMOVED] Urethral Catheter   12/08/20    0827    12/11/20    2112     3    [REMOVED] ETT    12/08/20    0710 created via procedure documentation    12/08/20    1201     less than 1                   Physician Progress Notes      Lyric Davenport APRN at 12/13/20 1022     Attestation signed by Zhang Smith MD at 12/13/20 1431    I have reviewed this documentation and agree.                  CC: POD 5 penile repair and skin graft    NAD  Urinating spontaneously  Has had wife present for dressing change  Pain is managed  +PO  Eye pain is improving, on drops now  Cr 1.02  WBC 11.8  Hgb 15  AVSS    Penile site with no dark tissue, pink edges and white graft  Leg dressing changed this am with blood tinged gauze    Plan:  Discharge home today   Wife to continue  to do BID dressing changes to penis and skin graft on leg.   Discussed with patient and he understands and is agreeable.         Electronically signed by Zhang Smith MD at 12/13/20 1431     Asher Page MD at 12/12/20 1721              [facility]     LOS: 2 days     Subjective:    CC:No chief complaint on file.    DIET:  Diet Order   Procedures   • Diet Regular; Consistent Carbohydrate     Severe right eye pain which is now improving  No soa  No cp  Patient's wound pain is reasonably controlled      Objective:    Vital Signs:  Temp:  [97.3 °F (36.3 °C)-98.4 °F (36.9 °C)] 98.4 °F (36.9 °C)  Heart Rate:  [65-82] 82  Resp:  [16-18] 16  BP: (131-147)/(85-93) 132/85    SpO2:  [94 %-96 %] 95 %  on   ;   Device (Oxygen Therapy): room air  Body mass index is 52.11 kg/m².    Physical Exam  Constitutional:       Appearance: He is well-developed.   HENT:      Head: Normocephalic and atraumatic.   Eyes:      General:         Right eye: No discharge.         Left eye: No discharge.      Conjunctiva/sclera: Conjunctivae normal.      Pupils: Pupils are equal, round, and reactive to light.   Cardiovascular:      Rate and Rhythm: Normal rate and regular rhythm.      Heart sounds: No murmur. No friction rub.   Pulmonary:      Effort: Pulmonary effort is normal.      Breath sounds: Normal breath sounds.   Abdominal:      General: Bowel sounds are normal. There is no distension.      Palpations: Abdomen is soft.      Tenderness: There is no abdominal tenderness.   Skin:     General: Skin is warm and dry.   Neurological:      General: No focal deficit present.      Mental Status: He is alert.   Psychiatric:         Mood and Affect: Mood normal.         Behavior: Behavior normal.         Results Review:    No results found for: GLUCOSE  Results from last 7 days   Lab Units 12/09/20  0802   WBC 10*3/mm3 11.87*   HEMOGLOBIN g/dL 15.0   HEMATOCRIT % 45.0   PLATELETS 10*3/mm3 205     Results from last  7 days   Lab Units 12/09/20  0802   SODIUM mmol/L 133*   POTASSIUM mmol/L 4.1   CHLORIDE mmol/L 98   CO2 mmol/L 24.9   BUN mg/dL 16   CREATININE mg/dL 1.02   CALCIUM mg/dL 8.4*   GLUCOSE mg/dL 174*         Results from last 7 days   Lab Units 12/09/20  0802   MAGNESIUM mg/dL 2.1         Cultures:  No results found for: BLOODCX, URINECX, WOUNDCX, MRSACX, RESPCX, STOOLCX    I have reviewed daily medications and changes in CPOE    Scheduled meds  amLODIPine, 5 mg, Oral, Nightly  enoxaparin, 40 mg, Subcutaneous, Daily  erythromycin, , Both Eyes, Q12H  escitalopram, 20 mg, Oral, Nightly  folic acid, 1,000 mcg, Oral, Daily  hydroCHLOROthiazide Oral, 12.5 mg, Oral, Daily  insulin lispro, 2 Units, Subcutaneous, TID With Meals  metFORMIN, 1,000 mg, Oral, BID With Meals  pantoprazole, 40 mg, Oral, Q AM  rosuvastatin, 20 mg, Oral, Nightly  sennosides-docusate, 2 tablet, Oral, Nightly  sodium chloride, 3 mL, Intravenous, Q12H  tobramycin-dexamethasone, 1 drop, Right Eye, Q2H While Awake  valsartan, 160 mg, Oral, Q24H        lactated ringers, 9 mL/hr, Last Rate: Stopped (12/08/20 1541)      PRN meds  •  acetaminophen **OR** acetaminophen  •  diphenhydrAMINE  •  Glycerin-Hypromellose-  •  HYDROmorphone **AND** naloxone  •  ketorolac  •  ondansetron **OR** ondansetron  •  oxyCODONE  •  sodium chloride        Acquired buried penis    Morbid obesity (CMS/[facility])    Type 2 diabetes mellitus with hyperglycemia (CMS/[facility])    Essential hypertension, benign        Assessment/Plan:  Brief Hospital Course to date:  Ray London is a 45 y.o. male with past medical history of morbid obesity who was hospitalized after surgery for acquired buried penis, and received surgical intervention on 12/8 for buried penis release.  Hospital medicine has been consulted to assist with management of diabetes and essential hypertension and it appears patient has accidentally scratched his eye postoperatively and may have corneal abrasion.  For which  ophthalmology was called and they have started the patient on TobraDex drops     Buried penis:  Management per urology.  Status post surgical intervention on      Possible corneal abrasion:  TobraDex drops     Consult ophthalmology has placed orders for drops     Essential hypertension:  Blood pressures are doing well, valsartan and hydrochlorothiazide.     Type 2 diabetes mellitus:  Prandial and basal insulin added to correction scale.  Monitor glucose and adjust further as needed.  -I have advised the patient to stop his Jardiance until peritoneal wound has healed secondary to slight increased risk for perineal infection     Morbid obesity: Weight loss recommended.  Supportive care.     DVT Prophylaxis:  LVX        Asher Page MD  20  17:21 EST        Electronically signed by Asher Page MD at 20 1734     Lyric Davenport APRN at 20 0901     Attestation signed by Zhang Smith MD at 20 1432    I have reviewed this documentation and agree.                  CC: I am ok    Patient resting in bed  NAD  A&O x 3  Left thigh dressing intact, VAC off.  Penile dressing intact, urinating spontaneously,.no dysuria.  Dressing change to be done with wife later this morning.     AVSS  WBC 11.8  Hgb 15  Cr 1.05    Plan:  Ambulate  Dressing change with wife today, per instructions left by Dr. Conteh.  Possible discharge home on       Electronically signed by Zhang Smith MD at 20 1432     Terry Conteh MD at 200        [facility] Update  2020  21:21 EST      Wound vac removed 2020, 9 pm    Plan for twice daily Dressing changes (and as needed)  Okay for showers 1-2 times daily  Anticipate Discharge  Morning     Buried Penis Dressing Instructions:      Penis Dressin. Remove dressing gently  2. Apply thin film of Bacitracin ointment to shaft of penis  3. Wrap Curad Oil Emulsion Dressing (3x8in) gently around shaft of  "penis once without any tightness.  4. Wrap Medline Conforming Stretch Gauze Bandage around shaft of penis 3 times without minimal tightness.  5. Wrap penis with Coban dressing around shaft of penis (3M Coban Self-Adherent Wrap 3\" x 5yards) 3 times, careful not to make it too tight  6. Apply \"Penis [place] Dressing\"   - Bolster penis with fluffed Kerlix gauze roll ([employer] 3324 Kerlix Bandage Roll, 100% coton, 6-ply 41/2\" x 4 1/8 yard) - do not wrap penis   - Apply Mesh underwear (Mesh high Waist knit underwear) to secure dressing in place    Thigh Dressin. Remove previous dressing, gently  2. Re-apply dry gauze (4x4\" or similar) to thigh skin graft harvest site  3. Apply Abd Pad (Medline Sterile Abdominal pad 5inch x 9 inch) and secure to leg with Paper Tape      Where to get supplies    1. Bacitracin ointment - Prescription given to you by Dr. Conteh    2. Curad Oil Emulsion Dressing (3x8in) - can be purchased online at [employer] for about $21 for a Box of 24    3. Medline Conforming Stretch Gauze Bandage (Flex-Trek Premium 36-Pack 3 inch Conforming stretch gauze bandage Rolls) - Can be purchased online at [employer] for $13 for  36-pack of 3 inch bandage rolls    4. Coban dressing (3M Coban Self-Adherent Wrap 3\" x 5yards) - Prescription given to you by Dr. Conteh - Can be purchased online at [employer] for $20-$40     5.   Kerlix Gauze Roll ([employer] 3324 Kerlix Bandage Roll, 100% cotton, 6-ply 41/2\" x 4 1/8 yard) - Can be purchased online at [employer] for #12    6.   Mesh underwear ( 5 pack Mesh high Waist knit underwear) - can be purchased online at [employer] for $13    7.   Dry gauze - can be found at any pharmacy    8.   Medline Sterile Abdominal pad 5inch x 9 inch, 2 packs of 25 count - can be purchased online at [employer] for $15            Terry Conteh MD  [facility]  General & Reconstructive Urology  Office: [phone]  Fax: [phone]      Electronically signed by Terry Conteh, " MD at 204            Discharge Summary      Lyric Davenport APRN at 20 1027     Attestation signed by Zhang Smith MD at 20 1432    Reviewed                  Urology Discharge Note    Name:  Ray London  Age:  45 y.o.  Sex:  male  :  1975  MRN:  [id number]    Date of Admission: 2020   Date of Discharge:  2020    Admitting Diagnosis: Buried Penis   Discharge Diagnosis: Buried penis s/p repair, scrotoplasty and skin graft     Presenting Problem  Active Hospital Problems    Diagnosis  POA   • Morbid obesity (CMS/[facility]) [E66.01]  Yes   • Type 2 diabetes mellitus with hyperglycemia (CMS/[facility]) [E11.65]  Yes   • Essential hypertension, benign [I10]  Yes   • 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         Hospital Course  Patient is a 45 y.o. male presented with a buried penis and underwent a repair, scrotoplasty and skin graft.  He is voiding spontaneously and taking PO. He is able to ambulate. His wife has participated in his dressing change and is to do BID changes upon discharge. He is afebrile and VSS. .      Procedures Performed    Procedure(s):  BURIED PENIS REPAIR, SPLIT THICKNESS SKIN GRAFT, CYSTOSCOPY, SCROTOPLASTY, APPLICATION OF NEGATIVE PRESSURE DRESSING  -------------------       Consults:   Consults     Date and Time Order Name Status Description    2020 1006 Inpatient Ophthalmology Consult      2020 1338 Inpatient Hospitalist Consult Completed           Pertinent Test Results:   Lab Results (last 24 hours)     Procedure Component Value Units Date/Time    POC Glucose Once [[id number]]  (Abnormal) Collected: 20    Specimen: Blood Updated: 20     Glucose 158 mg/dL     POC Glucose Once [[id number]]  (Abnormal) Collected: 20    Specimen: Blood Updated: 20     Glucose 156 mg/dL     POC Glucose Once [[id number]]  (Abnormal) Collected: 20  "1650    Specimen: Blood Updated: 12/12/20 1651     Glucose 205 mg/dL     POC Glucose Once [[id number]]  (Abnormal) Collected: 12/12/20 1143    Specimen: Blood Updated: 12/12/20 1159     Glucose 148 mg/dL         Imaging Results (Last 72 Hours)     ** No results found for the last 72 hours. **          Condition on Discharge:  Stable    Vital Signs     Vitals:    12/12/20 1557 12/12/20 1945 12/13/20 0325 12/13/20 0719   BP: 132/85 129/84 146/95 123/81   BP Location: Left arm Left arm Left arm Left arm   Patient Position: Sitting Lying Lying Sitting   Pulse: 82 69 72 64   Resp: 16 20 18 16   Temp: 98.4 °F (36.9 °C) 97.9 °F (36.6 °C) 96.7 °F (35.9 °C) 96.7 °F (35.9 °C)   TempSrc: Oral Oral Oral Oral   SpO2: 95% 96% 93% 97%   Weight:       Height:           Physical Exam:   Vital signs: /81 (BP Location: Left arm, Patient Position: Sitting)   Pulse 64   Temp 96.7 °F (35.9 °C) (Oral)   Resp 16   Ht 182.9 cm (72\")   Wt (!) 174 kg (384 lb 3 oz)   SpO2 97%   BMI 52.11 kg/m²   97%     General: Well developed, well nourished, alert and oriented x 3    Appears stated age. No apparent distress.    HEENT: Moist mucous membranes of the oral mucosa & nasal mucosa.    Lips are not cyanotic.    Extraocular movements intact    Neck:  Trachea position is mid line.    Respiratory: Respiratory rhythm & depth: Normal.    Respiratory effort:  Normal.    GI:  Nondistended. Nontender.    Skin:  Right leg graft site with gauze dressing, blood tinged.        Extremities: No clubbing & no cyanosis.    No edema    :  Phallus with graft, no necrotic tissue, pink edges and yellow/white graft.  Minimal bloody drainage.        Discharge Disposition  Home or Self Care    Discharge Medications     Discharge Medications      New Medications      Instructions Start Date   acetaminophen 325 MG tablet  Commonly known as: Tylenol   650 mg, Oral, Every 4 Hours PRN      bacitracin 500 UNIT/GM ointment   Apply to affected area daily    "   cephalexin 500 MG capsule  Commonly known as: KEFLEX   500 mg, Oral, 2 Times Daily      docusate sodium 100 MG capsule  Commonly known as: Colace   100 mg, Oral, Daily PRN      HYDROcodone-acetaminophen 5-325 MG per tablet  Commonly known as: NORCO   1 tablet, Oral, Every 6 Hours PRN         Changes to Medications      Instructions Start Date   amLODIPine 5 MG tablet  Commonly known as: NORVASC  What changed: when to take this   5 mg, Oral, Daily, For BP      [employer] D3 50 MCG (2000 UT) capsule  Generic drug: Cholecalciferol  What changed:   · how much to take  · when to take this   TAKE ONE CAPSULE BY MOUTH EVERY DAY      Dulaglutide 1.5 MG/0.5ML solution pen-injector  Commonly known as: Trulicity  What changed: additional instructions   1.5 mg, Subcutaneous, Weekly, Inject 1.5mg SC once weekly for DMII      Empagliflozin 25 MG tablet  Commonly known as: Jardiance  What changed: when to take this   25 mg, Oral, Daily, For DMII      escitalopram 20 MG tablet  Commonly known as: LEXAPRO  What changed:   · how much to take  · how to take this  · when to take this   ONE TAB DAILY FOR STRESS      omeprazole 20 MG capsule  Commonly known as: priLOSEC  What changed: when to take this   20 mg, Oral, Daily, For GERD      rosuvastatin 20 MG tablet  Commonly known as: Crestor  What changed: when to take this   20 mg, Oral, Daily, For cholesterol--new dose      valsartan-hydrochlorothiazide 160-12.5 MG per tablet  Commonly known as: DIOVAN-HCT  What changed: when to take this   1 tablet, Oral, Daily, for blood pressure.         Continue These Medications      Instructions Start Date   folic acid 1 MG tablet  Commonly known as: FOLVITE   1,000 mcg, Oral, Daily      metFORMIN 500 MG tablet  Commonly known as: Glucophage   1,000 mg, Oral, 2 Times Daily With Meals, For DMII      Stelara 90 MG/ML solution prefilled syringe Injection  Generic drug: Ustekinumab   Subcutaneous, Every 3 Months, DUE IN JANUARY         Stop These  Medications    clobetasol 0.05 % ointment  Commonly known as: TEMOVATE            Discharge Diet: Regular     Activity at Discharge: No heavy lifting, no tub baths     Follow-up Appointments  No future appointments.  Additional Instructions for the Follow-ups that You Need to Schedule     Discharge Follow-up with Specialty:   As directed      Follow Up Details: Follow-up With Ophthalmology if No Improvement in 1 Day             Prescriptions have been sent to your pharmacy by Dr. Bettencourt.   Please call if any concerns, [phone] and to confirm your follow up with Dr. Bettencourt.   Continue BID dressing changes.      Lyric Davenport, APRN  12/13/20  10:27 EST      Electronically signed by Zhang Smith MD at 12/13/20 1432       Discharge Order (From admission, onward)     Start     Ordered    12/13/20 1027  Discharge patient  Once     Expected Discharge Date: 12/13/20    Expected Discharge Time: Midday    Discharge Disposition: Home or Self Care    Physician of Record for Attribution - Please select from Treatment Team: EDDIE BETTENCOURT [[id number]]    Review needed by CMO to determine Physician of Record: No       Question Answer Comment   Physician of Record for Attribution - Please select from Treatment Team EDDIE BETTENCOURT    Review needed by CMO to determine Physician of Record No        12/13/20 1029

## 2020-12-14 NOTE — PROGRESS NOTES
Case Management Discharge Note      Final Note: [facility]  and [facility]  unable to accept patient due to staffing. Informed patient & spouse. Referral called to [facility]. Says able to accept. Awaiting authorization and will see patient this week. No additional needs noted.         Selected Continued Care - Discharged on 12/13/2020 Admission date: 12/8/2020 - Discharge disposition: Home or Self Care    Destination    No services have been selected for the patient.              Durable Medical Equipment    No services have been selected for the patient.              Dialysis/Infusion    No services have been selected for the patient.              Home Medical Care     Service Provider Selected Services Address Phone Fax Patient Preferred    [facility] [address] [place] [address] [phone] --          Therapy    No services have been selected for the patient.              Community Resources    No services have been selected for the patient.                       Final Discharge Disposition Code: 06 - home with home health care

## 2020-12-14 NOTE — OUTREACH NOTE
Call Center TCM Note      Responses   [facility] patient discharged from?  [place]   Does the patient have one of the following disease processes/diagnoses(primary or secondary)?  Other   TCM attempt successful?  Yes   Call start time  1006   Call end time  1008   Discharge diagnosis  Buried penis s/p repair, scrotoplasty and skin graft    Meds reviewed with patient/caregiver?  Yes   Is the patient having any side effects they believe may be caused by any medication additions or changes?  No   Does the patient have all medications ordered at discharge?  Yes   Is the patient taking all medications as directed (includes completed medication regime)?  Yes   Does the patient have a primary care provider?   Yes   Does the patient have an appointment with their PCP within 7 days of discharge?  Greater than 7 days   Comments regarding PCP  Hospital d/c f/u appt is on 12/22/20 at 1:30 pm    What is preventing the patient from scheduling follow up appointments within 7 days of discharge?  -- [This was the first appt patient was agreeable to ]   Nursing Interventions  Verified appointment date/time/provider   Has the patient kept scheduled appointments due by today?  N/A   What is the Home health agency?   [facility]   Psychosocial issues?  No   Did the patient receive a copy of their discharge instructions?  Yes   Nursing interventions  Reviewed instructions with patient   What is the patient's perception of their health status since discharge?  Improving   Is the patient/caregiver able to teach back signs and symptoms related to disease process for when to call PCP?  Yes   Is the patient/caregiver able to teach back signs and symptoms related to disease process for when to call 911?  Yes   Is the patient/caregiver able to teach back the hierarchy of who to call/visit for symptoms/problems? PCP, Specialist, Home health nurse, Urgent Care, ED, 911  Yes   If the patient is a current smoker, are they able to teach  back resources for cessation?  Not a smoker   TCM call completed?  Yes          Nancy Polanco RN    12/14/2020, 10:09 EST

## 2020-12-23 ENCOUNTER — OFFICE VISIT (OUTPATIENT)
Dept: FAMILY MEDICINE CLINIC | Facility: CLINIC | Age: 45
End: 2020-12-23

## 2020-12-23 VITALS
RESPIRATION RATE: 16 BRPM | TEMPERATURE: 97.5 F | SYSTOLIC BLOOD PRESSURE: 130 MMHG | HEART RATE: 80 BPM | HEIGHT: 72 IN | BODY MASS INDEX: 42.66 KG/M2 | OXYGEN SATURATION: 100 % | DIASTOLIC BLOOD PRESSURE: 60 MMHG | WEIGHT: 315 LBS

## 2020-12-23 DIAGNOSIS — Z09 HOSPITAL DISCHARGE FOLLOW-UP: Primary | ICD-10-CM

## 2020-12-23 DIAGNOSIS — E11.65 CONTROLLED TYPE 2 DIABETES MELLITUS WITH HYPERGLYCEMIA, WITH LONG-TERM CURRENT USE OF INSULIN (HCC): ICD-10-CM

## 2020-12-23 DIAGNOSIS — Z79.4 CONTROLLED TYPE 2 DIABETES MELLITUS WITH HYPERGLYCEMIA, WITH LONG-TERM CURRENT USE OF INSULIN (HCC): ICD-10-CM

## 2020-12-23 DIAGNOSIS — E78.2 MIXED HYPERLIPIDEMIA: ICD-10-CM

## 2020-12-23 PROCEDURE — 99495 TRANSJ CARE MGMT MOD F2F 14D: CPT | Performed by: PHYSICIAN ASSISTANT

## 2020-12-23 RX ORDER — NAPROXEN 500 MG/1
500 TABLET ORAL 2 TIMES DAILY WITH MEALS
Qty: 180 TABLET | Refills: 1 | Status: SHIPPED | OUTPATIENT
Start: 2020-12-23 | End: 2021-05-13 | Stop reason: HOSPADM

## 2020-12-23 NOTE — PATIENT INSTRUCTIONS
Insulin Glargine injection  What is this medicine?  INSULIN GLARGINE (IN gillespie umang ramos) is a human-made form of insulin. This drug lowers the amount of sugar in your blood. It is a long-acting insulin that is usually given once a day.  This medicine may be used for other purposes; ask your health care provider or pharmacist if you have questions.  COMMON BRAND NAME(S): BASAGLAR, Lantus, Lantus SoloStar, Semglee, Toujeo Max SoloStar, Toujeo SoloStar  What should I tell my health care provider before I take this medicine?  They need to know if you have any of these conditions:  · episodes of low blood sugar  · eye disease, vision problems  · kidney disease  · liver disease  · an unusual or allergic reaction to insulin, metacresol, other medicines, foods, dyes, or preservatives  · pregnant or trying to get pregnant  · breast-feeding  How should I use this medicine?  This medicine is for injection under the skin. Use this medicine at the same time each day. Use exactly as directed. This insulin should never be mixed in the same syringe with other insulins before injection. Do not vigorously shake before use. You will be taught how to use this medicine and how to adjust doses for activities and illness. Do not use more insulin than prescribed.  Always check the appearance of your insulin before using it. This medicine should be clear and colorless like water. Do not use it if it is cloudy, thickened, colored, or has solid particles in it.  If you use an insulin pen, be sure to take off the outer needle cover before using the dose.  It is important that you put your used needles and syringes in a special sharps container. Do not put them in a trash can. If you do not have a sharps container, call your pharmacist or healthcare provider to get one.  This drug comes with INSTRUCTIONS FOR USE. Ask your pharmacist for directions on how to use this drug. Read the information carefully. Talk to your pharmacist or health care  provider if you have questions.  Talk to your pediatrician regarding the use of this medicine in children. While this drug may be prescribed for children as young as 6 years for selected conditions, precautions do apply.  Overdosage: If you think you have taken too much of this medicine contact a poison control center or emergency room at once.  NOTE: This medicine is only for you. Do not share this medicine with others.  What if I miss a dose?  It is important not to miss a dose. Your health care professional or doctor should discuss a plan for missed doses with you. If you do miss a dose, follow their plan. Do not take double doses.  What may interact with this medicine?  · other medicines for diabetes  Many medications may cause changes in blood sugar, these include:  · alcohol containing beverages  · antiviral medicines for HIV or AIDS  · aspirin and aspirin-like drugs  · certain medicines for blood pressure, heart disease, irregular heart beat  · chromium  · diuretics  · female hormones, such as estrogens or progestins, birth control pills  · fenofibrate  · gemfibrozil  · isoniazid  · lanreotide  · male hormones or anabolic steroids  · MAOIs like Carbex, Eldepryl, Marplan, Nardil, and Parnate  · medicines for weight loss  · medicines for allergies, asthma, cold, or cough  · medicines for depression, anxiety, or psychotic disturbances  · niacin  · nicotine  · NSAIDs, medicines for pain and inflammation, like ibuprofen or naproxen  · octreotide  · pasireotide  · pentamidine  · phenytoin  · probenecid  · quinolone antibiotics such as ciprofloxacin, levofloxacin, ofloxacin  · some herbal dietary supplements  · steroid medicines such as prednisone or cortisone  · sulfamethoxazole; trimethoprim  · thyroid hormones  Some medications can hide the warning symptoms of low blood sugar (hypoglycemia). You may need to monitor your blood sugar more closely if you are taking one of these medications. These  include:  · beta-blockers, often used for high blood pressure or heart problems (examples include atenolol, metoprolol, propranolol)  · clonidine  · guanethidine  · reserpine  This list may not describe all possible interactions. Give your health care provider a list of all the medicines, herbs, non-prescription drugs, or dietary supplements you use. Also tell them if you smoke, drink alcohol, or use illegal drugs. Some items may interact with your medicine.  What should I watch for while using this medicine?  Visit your health care professional or doctor for regular checks on your progress.  Do not drive, use machinery, or do anything that needs mental alertness until you know how this medicine affects you. Alcohol may interfere with the effect of this medicine. Avoid alcoholic drinks.  A test called the HbA1C (A1C) will be monitored. This is a simple blood test. It measures your blood sugar control over the last 2 to 3 months. You will receive this test every 3 to 6 months.  Learn how to check your blood sugar. Learn the symptoms of low and high blood sugar and how to manage them.  Always carry a quick-source of sugar with you in case you have symptoms of low blood sugar. Examples include hard sugar candy or glucose tablets. Make sure others know that you can choke if you eat or drink when you develop serious symptoms of low blood sugar, such as seizures or unconsciousness. They must get medical help at once.  Tell your doctor or health care professional if you have high blood sugar. You might need to change the dose of your medicine. If you are sick or exercising more than usual, you might need to change the dose of your medicine.  Do not skip meals. Ask your doctor or health care professional if you should avoid alcohol. Many nonprescription cough and cold products contain sugar or alcohol. These can affect blood sugar.  Make sure that you have the right kind of syringe for the type of insulin you use. Try not  to change the brand and type of insulin or syringe unless your health care professional or doctor tells you to. Switching insulin brand or type can cause dangerously high or low blood sugar. Always keep an extra supply of insulin, syringes, and needles on hand. Use a syringe one time only. Throw away syringe and needle in a closed container to prevent accidental needle sticks.  Insulin pens and cartridges should never be shared. Even if the needle is changed, sharing may result in passing of viruses like hepatitis or HIV.  Each time you get a new box of pen needles, check to see if they are the same type as the ones you were trained to use. If not, ask your health care professional to show you how to use this new type properly.  Wear a medical ID bracelet or chain, and carry a card that describes your disease and details of your medicine and dosage times.  What side effects may I notice from receiving this medicine?  Side effects that you should report to your doctor or health care professional as soon as possible:  · allergic reactions like skin rash, itching or hives, swelling of the face, lips, or tongue  · breathing problems  · signs and symptoms of high blood sugar such as dizziness, dry mouth, dry skin, fruity breath, nausea, stomach pain, increased hunger or thirst, increased urination  · signs and symptoms of low blood sugar such as feeling anxious, confusion, dizziness, increased hunger, unusually weak or tired, sweating, shakiness, cold, irritable, headache, blurred vision, fast heartbeat, loss of consciousness  Side effects that usually do not require medical attention (report to your doctor or health care professional if they continue or are bothersome):  · increase or decrease in fatty tissue under the skin due to overuse of a particular injection site  · itching, burning, swelling, or rash at site where injected  This list may not describe all possible side effects. Call your doctor for medical advice  about side effects. You may report side effects to FDA at 0-229-FDA-8689.  Where should I keep my medicine?  Keep out of the reach of children.  Unopened Vials:  Lantus vials: Store in a refrigerator between 2 and 8 degrees C (36 and 46 degrees F) or at room temperature below 30 degrees C (86 degrees F). Do not freeze or use if the insulin has been frozen. Protect from light and excessive heat. If stored at room temperature, the vial must be discarded after 28 days. Throw away any unopened and unused medicine that has been stored in the refrigerator after the expiration date.  Unopened Pens:  Basaglar KwikPens: Store in a refrigerator between 2 and 8 degrees C (36 and 46 degrees F) or at room temperature below 30 degrees C (86 degrees F). Do not freeze or use if the insulin has been frozen. Protect from light and excessive heat. If stored at room temperature, the pen must be discarded after 28 days. Throw away any unopened and unused medicine that has been stored in the refrigerator after the expiration date.  Lantus Solostar Pens: Store in a refrigerator between 2 and 8 degrees C (36 and 46 degrees F) or at room temperature below 30 degrees C (86 degrees F). Do not freeze or use if the insulin has been frozen. Protect from light and excessive heat. If stored at room temperature, the pen must be discarded after 28 days. Throw away any unopened and unused medicine that has been stored in the refrigerator after the expiration date.  Semglee Pens: Store in a refrigerator between 2 and 8 degrees C (36 and 46 degrees F) or at room temperature below 30 degrees C (86 degrees F). Do not freeze or use if the insulin has been frozen. Protect from light and excessive heat. If stored at room temperature, the pen must be discarded after 28 days. Throw away any unopened and unused medicine that has been stored in the refrigerator after the expiration date.  Toujeo Solostar Pens or Toujeo Max Solostar Pens: Store in a refrigerator  between 2 and 8 degrees C (36 and 46 degrees F). Do not freeze or use if the insulin has been frozen. Protect from light and excessive heat. Throw away any unopened and unused medicine that has been stored in the refrigerator after the expiration date.  Vials that you are using:  Lantus vials: Store in a refrigerator or at room temperature below 30 degrees C (86 degrees F). Do not freeze. Keep away from heat and light. Throw the opened vial away after 28 days.  Semglee vials: Store in a refrigerator or at room temperature below 30 degrees C (86 degrees F). Do not freeze. Keep away from heat and light. Throw the opened vial away after 28 days.  Pens that you are using:  Basaglar KwikPens: Store at room temperature below 30 degrees C (86 degrees F). Do not refrigerate or freeze. Keep away from heat and light. Throw the pen away after 28 days, even if it still has insulin left in it.  Lantus Solostar Pens: Store at room temperature below 30 degrees C (86 degrees F). Do not refrigerate or freeze. Keep away from heat and light. Throw the pen away after 28 days, even if it still has insulin left in it.  Semglee Pens: Store at room temperature below 30 degrees C (86 degrees F). Do not refrigerate or freeze. Keep away from heat and light. Throw the pen away after 28 days, even if it still has insulin left in it.  Toujeo Solostar Pens or Toujeo Max Solostar Pens: Store at room temperature below 30 degrees C (86 degrees F). Do not refrigerate or freeze. Keep away from heat and light. Throw the pen away after 56 days, even if it still has insulin left in it.  NOTE: This sheet is a summary. It may not cover all possible information. If you have questions about this medicine, talk to your doctor, pharmacist, or health care provider.  © 2020 [employer] (2020-10-07 15:00:23)

## 2020-12-23 NOTE — PROGRESS NOTES
Subjective   Ray London is a 45 y.o. male.     History of Present Illness   Ray London 45 y.o. male presents today for hosptial follow up.  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  I reviewed all of the labs and diagnostic testing and noted: labs.  Normal renal labs  The patient's medications were changed: Jardiance on hold  Current outpatient and discharge medications have been reconciled for the patient.  Reviewed by: Claudette Lux PA-C    he does have a follow up appointment with a specialist:  Lab Results   Component Value Date    HGBA1C 9.10 (H) 07/23/2020     Doing well post op; slowly increasing activity; has f/u urologist  He is taking routine meds --just not Jardiance    Having to hold Jardiance d/t  surgery.  Since off glucose average 250  The following portions of the patient's history were reviewed and updated as appropriate: allergies, current medications, past family history, past medical history, past social history, past surgical history and problem list.    Review of Systems   Constitutional: Positive for activity change and appetite change. Negative for unexpected weight change.   HENT: Negative for nosebleeds and trouble swallowing.    Eyes: Negative for pain and visual disturbance.   Respiratory: Negative for chest tightness, shortness of breath and wheezing.    Cardiovascular: Negative for chest pain and palpitations.   Gastrointestinal: Negative for abdominal pain and blood in stool.   Endocrine: Negative.    Genitourinary: Negative for difficulty urinating and hematuria.   Musculoskeletal: Negative for joint swelling.   Skin: Positive for rash. Negative for color change.   Allergic/Immunologic: Negative.    Neurological: Negative for syncope and speech difficulty.   Hematological: Negative for adenopathy.   Psychiatric/Behavioral: Negative for agitation and confusion.   All other systems reviewed and are negative.      Objective   Physical Exam  Vitals  signs and nursing note reviewed.   Constitutional:       General: He is not in acute distress.     Appearance: He is well-developed. He is obese. He is not ill-appearing, toxic-appearing or diaphoretic.   HENT:      Head: Normocephalic.   Eyes:      General: No scleral icterus.     Conjunctiva/sclera: Conjunctivae normal.      Pupils: Pupils are equal, round, and reactive to light.   Neck:      Musculoskeletal: Normal range of motion and neck supple.   Cardiovascular:      Rate and Rhythm: Normal rate and regular rhythm.      Pulses: Normal pulses.      Heart sounds: Normal heart sounds. No murmur.   Pulmonary:      Effort: Pulmonary effort is normal.      Breath sounds: Normal breath sounds. No rhonchi or rales.   Musculoskeletal: Normal range of motion.      Right lower leg: Edema present.      Left lower leg: Edema present.      Comments: Trace pedal edema = bilat     Skin:     General: Skin is warm and dry.      Coloration: Skin is not jaundiced.      Findings: No rash.   Neurological:      General: No focal deficit present.      Mental Status: He is alert and oriented to person, place, and time. Mental status is at baseline.   Psychiatric:         Mood and Affect: Mood normal. Affect is not inappropriate.         Behavior: Behavior normal.         Thought Content: Thought content normal.         Judgment: Judgment normal.         Assessment/Plan   Diagnoses and all orders for this visit:    1. Hospital discharge follow-up (Primary)    2. Controlled type 2 diabetes mellitus with hyperglycemia, with long-term current use of insulin (CMS/[facility])    3. Mixed hyperlipidemia    Other orders  -     naproxen (Naprosyn) 500 MG tablet; Take 1 tablet by mouth 2 (Two) Times a Day With Meals. With food PRN pain--stop if GI upset  Dispense: 180 tablet; Refill: 1  -     Insulin Pen Needle 32G X 4 MM misc; For once daily use with daily injection  Dispense: 100 each; Refill: 3  -     Insulin Glargine (LANTUS SOLOSTAR) 100 UNIT/ML  injection pen; E 11.5; start with 20 units at HS may increase by 2 units every 3 days until fasting <150  Dispense: 5 pen; Refill: 5      Cont Metformin and Trulicity   Cont bp Rx and statin  Fu urologist  Will refill Naprosyn; normal renal labs;  Stop NSAID if GI upset or any signs tarry or blood in stools  Will add Lantus 20---go up by 2 units every 3 days until fasting <150  Get labs--update A1C  F/u 1 mo  Has meter and supplies

## 2021-01-25 RX ORDER — DULAGLUTIDE 1.5 MG/.5ML
INJECTION, SOLUTION SUBCUTANEOUS
Qty: 4 PEN | Refills: 5 | Status: SHIPPED | OUTPATIENT
Start: 2021-01-25 | End: 2021-05-26 | Stop reason: SDUPTHER

## 2021-01-26 RX ORDER — METFORMIN HYDROCHLORIDE 500 MG/1
TABLET, EXTENDED RELEASE ORAL
Qty: 120 TABLET | Refills: 5 | Status: SHIPPED | OUTPATIENT
Start: 2021-01-26 | End: 2021-05-26 | Stop reason: SDUPTHER

## 2021-04-02 ENCOUNTER — BULK ORDERING (OUTPATIENT)
Dept: CASE MANAGEMENT | Facility: OTHER | Age: 46
End: 2021-04-02

## 2021-04-02 DIAGNOSIS — Z23 IMMUNIZATION DUE: ICD-10-CM

## 2021-05-10 ENCOUNTER — APPOINTMENT (OUTPATIENT)
Dept: GENERAL RADIOLOGY | Facility: HOSPITAL | Age: 46
End: 2021-05-10

## 2021-05-10 ENCOUNTER — HOSPITAL ENCOUNTER (INPATIENT)
Facility: HOSPITAL | Age: 46
LOS: 2 days | Discharge: HOME | End: 2021-05-13
Attending: EMERGENCY MEDICINE | Admitting: SURGERY

## 2021-05-10 DIAGNOSIS — L03.116 CELLULITIS OF LEFT LOWER EXTREMITY: ICD-10-CM

## 2021-05-10 DIAGNOSIS — D72.829 LEUKOCYTOSIS, UNSPECIFIED TYPE: ICD-10-CM

## 2021-05-10 DIAGNOSIS — S91.102A OPEN WOUND OF LEFT GREAT TOE, INITIAL ENCOUNTER: ICD-10-CM

## 2021-05-10 DIAGNOSIS — E11.65 POORLY CONTROLLED DIABETES MELLITUS (HCC): Primary | ICD-10-CM

## 2021-05-10 LAB
ALBUMIN SERPL-MCNC: 4.5 G/DL (ref 3.5–5.2)
ALBUMIN/GLOB SERPL: 1.4 G/DL
ALP SERPL-CCNC: 77 U/L (ref 39–117)
ALT SERPL W P-5'-P-CCNC: 23 U/L (ref 1–41)
ANION GAP SERPL CALCULATED.3IONS-SCNC: 11.7 MMOL/L (ref 5–15)
AST SERPL-CCNC: 15 U/L (ref 1–40)
B-OH-BUTYR SERPL-SCNC: 0.23 MMOL/L (ref 0.02–0.27)
BACTERIA UR QL AUTO: ABNORMAL /HPF
BASOPHILS # BLD AUTO: 0.09 10*3/MM3 (ref 0–0.2)
BASOPHILS NFR BLD AUTO: 0.8 % (ref 0–1.5)
BILIRUB SERPL-MCNC: 0.9 MG/DL (ref 0–1.2)
BILIRUB UR QL STRIP: NEGATIVE
BUN SERPL-MCNC: 13 MG/DL (ref 6–20)
BUN/CREAT SERPL: 13.4 (ref 7–25)
CALCIUM SPEC-SCNC: 9.5 MG/DL (ref 8.6–10.5)
CHLORIDE SERPL-SCNC: 97 MMOL/L (ref 98–107)
CLARITY UR: CLEAR
CO2 SERPL-SCNC: 25.3 MMOL/L (ref 22–29)
COD CRY URNS QL: ABNORMAL /HPF
COLOR UR: ABNORMAL
CREAT SERPL-MCNC: 0.97 MG/DL (ref 0.76–1.27)
DEPRECATED RDW RBC AUTO: 38.3 FL (ref 37–54)
EOSINOPHIL # BLD AUTO: 0.13 10*3/MM3 (ref 0–0.4)
EOSINOPHIL NFR BLD AUTO: 1.1 % (ref 0.3–6.2)
ERYTHROCYTE [DISTWIDTH] IN BLOOD BY AUTOMATED COUNT: 12.3 % (ref 12.3–15.4)
ERYTHROCYTE [SEDIMENTATION RATE] IN BLOOD: 36 MM/HR (ref 0–15)
GFR SERPL CREATININE-BSD FRML MDRD: 84 ML/MIN/1.73
GLOBULIN UR ELPH-MCNC: 3.3 GM/DL
GLUCOSE BLDC GLUCOMTR-MCNC: 240 MG/DL (ref 70–130)
GLUCOSE BLDC GLUCOMTR-MCNC: 251 MG/DL (ref 70–130)
GLUCOSE SERPL-MCNC: 206 MG/DL (ref 65–99)
GLUCOSE UR STRIP-MCNC: ABNORMAL MG/DL
HCT VFR BLD AUTO: 45.6 % (ref 37.5–51)
HGB BLD-MCNC: 15.6 G/DL (ref 13–17.7)
HGB UR QL STRIP.AUTO: NEGATIVE
HOLD SPECIMEN: NORMAL
HOLD SPECIMEN: NORMAL
HYALINE CASTS UR QL AUTO: ABNORMAL /LPF
IMM GRANULOCYTES # BLD AUTO: 0.08 10*3/MM3 (ref 0–0.05)
IMM GRANULOCYTES NFR BLD AUTO: 0.7 % (ref 0–0.5)
KETONES UR QL STRIP: ABNORMAL
LEUKOCYTE ESTERASE UR QL STRIP.AUTO: ABNORMAL
LYMPHOCYTES # BLD AUTO: 2.31 10*3/MM3 (ref 0.7–3.1)
LYMPHOCYTES NFR BLD AUTO: 19.5 % (ref 19.6–45.3)
MCH RBC QN AUTO: 29.5 PG (ref 26.6–33)
MCHC RBC AUTO-ENTMCNC: 34.2 G/DL (ref 31.5–35.7)
MCV RBC AUTO: 86.4 FL (ref 79–97)
MONOCYTES # BLD AUTO: 1.02 10*3/MM3 (ref 0.1–0.9)
MONOCYTES NFR BLD AUTO: 8.6 % (ref 5–12)
NEUTROPHILS NFR BLD AUTO: 69.3 % (ref 42.7–76)
NEUTROPHILS NFR BLD AUTO: 8.24 10*3/MM3 (ref 1.7–7)
NITRITE UR QL STRIP: NEGATIVE
NRBC BLD AUTO-RTO: 0 /100 WBC (ref 0–0.2)
PH UR STRIP.AUTO: 6 [PH] (ref 5–8)
PLATELET # BLD AUTO: 233 10*3/MM3 (ref 140–450)
PMV BLD AUTO: 9.6 FL (ref 6–12)
POTASSIUM SERPL-SCNC: 4.1 MMOL/L (ref 3.5–5.2)
PROT SERPL-MCNC: 7.8 G/DL (ref 6–8.5)
PROT UR QL STRIP: ABNORMAL
RBC # BLD AUTO: 5.28 10*6/MM3 (ref 4.14–5.8)
RBC # UR: ABNORMAL /HPF
REF LAB TEST METHOD: ABNORMAL
SODIUM SERPL-SCNC: 134 MMOL/L (ref 136–145)
SP GR UR STRIP: 1.03 (ref 1–1.03)
SQUAMOUS #/AREA URNS HPF: ABNORMAL /HPF
UROBILINOGEN UR QL STRIP: ABNORMAL
WBC # BLD AUTO: 11.87 10*3/MM3 (ref 3.4–10.8)
WBC UR QL AUTO: ABNORMAL /HPF
WHOLE BLOOD HOLD SPECIMEN: NORMAL
WHOLE BLOOD HOLD SPECIMEN: NORMAL

## 2021-05-10 PROCEDURE — 80053 COMPREHEN METABOLIC PANEL: CPT

## 2021-05-10 PROCEDURE — 81001 URINALYSIS AUTO W/SCOPE: CPT

## 2021-05-10 PROCEDURE — 99284 EMERGENCY DEPT VISIT MOD MDM: CPT

## 2021-05-10 PROCEDURE — 82010 KETONE BODYS QUAN: CPT

## 2021-05-10 PROCEDURE — 82962 GLUCOSE BLOOD TEST: CPT

## 2021-05-10 PROCEDURE — 85652 RBC SED RATE AUTOMATED: CPT | Performed by: PHYSICIAN ASSISTANT

## 2021-05-10 PROCEDURE — 85025 COMPLETE CBC W/AUTO DIFF WBC: CPT

## 2021-05-10 PROCEDURE — 86140 C-REACTIVE PROTEIN: CPT | Performed by: PHYSICIAN ASSISTANT

## 2021-05-10 PROCEDURE — 73630 X-RAY EXAM OF FOOT: CPT

## 2021-05-10 RX ORDER — SODIUM CHLORIDE 0.9 % (FLUSH) 0.9 %
10 SYRINGE (ML) INJECTION AS NEEDED
Status: DISCONTINUED | OUTPATIENT
Start: 2021-05-10 | End: 2021-05-13 | Stop reason: HOSPADM

## 2021-05-11 ENCOUNTER — APPOINTMENT (OUTPATIENT)
Dept: MRI IMAGING | Facility: HOSPITAL | Age: 46
End: 2021-05-11

## 2021-05-11 PROBLEM — E11.65 POORLY CONTROLLED DIABETES MELLITUS (HCC): Status: ACTIVE | Noted: 2021-05-11

## 2021-05-11 PROBLEM — L03.116 CELLULITIS OF LEFT LOWER EXTREMITY: Status: ACTIVE | Noted: 2019-12-11

## 2021-05-11 LAB
ANION GAP SERPL CALCULATED.3IONS-SCNC: 10.3 MMOL/L (ref 5–15)
BUN SERPL-MCNC: 11 MG/DL (ref 6–20)
BUN/CREAT SERPL: 11.8 (ref 7–25)
CALCIUM SPEC-SCNC: 8.4 MG/DL (ref 8.6–10.5)
CHLORIDE SERPL-SCNC: 101 MMOL/L (ref 98–107)
CO2 SERPL-SCNC: 23.7 MMOL/L (ref 22–29)
CREAT SERPL-MCNC: 0.93 MG/DL (ref 0.76–1.27)
CRP SERPL-MCNC: 14.55 MG/DL (ref 0–0.5)
D-LACTATE SERPL-SCNC: 1.6 MMOL/L (ref 0.5–2)
DEPRECATED RDW RBC AUTO: 39.7 FL (ref 37–54)
ERYTHROCYTE [DISTWIDTH] IN BLOOD BY AUTOMATED COUNT: 12.5 % (ref 12.3–15.4)
GFR SERPL CREATININE-BSD FRML MDRD: 88 ML/MIN/1.73
GLUCOSE BLDC GLUCOMTR-MCNC: 179 MG/DL (ref 70–130)
GLUCOSE BLDC GLUCOMTR-MCNC: 214 MG/DL (ref 70–130)
GLUCOSE BLDC GLUCOMTR-MCNC: 219 MG/DL (ref 70–130)
GLUCOSE BLDC GLUCOMTR-MCNC: 219 MG/DL (ref 70–130)
GLUCOSE SERPL-MCNC: 185 MG/DL (ref 65–99)
HBA1C MFR BLD: 9.9 % (ref 4.8–5.6)
HCT VFR BLD AUTO: 40.7 % (ref 37.5–51)
HGB BLD-MCNC: 13.6 G/DL (ref 13–17.7)
MCH RBC QN AUTO: 29.3 PG (ref 26.6–33)
MCHC RBC AUTO-ENTMCNC: 33.4 G/DL (ref 31.5–35.7)
MCV RBC AUTO: 87.7 FL (ref 79–97)
PLATELET # BLD AUTO: 178 10*3/MM3 (ref 140–450)
PMV BLD AUTO: 10 FL (ref 6–12)
POTASSIUM SERPL-SCNC: 3.4 MMOL/L (ref 3.5–5.2)
POTASSIUM SERPL-SCNC: 4 MMOL/L (ref 3.5–5.2)
PROCALCITONIN SERPL-MCNC: 0.2 NG/ML (ref 0–0.25)
RBC # BLD AUTO: 4.64 10*6/MM3 (ref 4.14–5.8)
SARS-COV-2 ORF1AB RESP QL NAA+PROBE: NOT DETECTED
SODIUM SERPL-SCNC: 135 MMOL/L (ref 136–145)
WBC # BLD AUTO: 9.36 10*3/MM3 (ref 3.4–10.8)

## 2021-05-11 PROCEDURE — 25010000002 PIPERACILLIN SOD-TAZOBACTAM PER 1 G: Performed by: NURSE PRACTITIONER

## 2021-05-11 PROCEDURE — 87205 SMEAR GRAM STAIN: CPT | Performed by: PHYSICIAN ASSISTANT

## 2021-05-11 PROCEDURE — 63710000001 INSULIN LISPRO (HUMAN) PER 5 UNITS: Performed by: NURSE PRACTITIONER

## 2021-05-11 PROCEDURE — 85027 COMPLETE CBC AUTOMATED: CPT | Performed by: NURSE PRACTITIONER

## 2021-05-11 PROCEDURE — 87147 CULTURE TYPE IMMUNOLOGIC: CPT | Performed by: PHYSICIAN ASSISTANT

## 2021-05-11 PROCEDURE — 25010000002 PIPERACILLIN SOD-TAZOBACTAM PER 1 G: Performed by: PHYSICIAN ASSISTANT

## 2021-05-11 PROCEDURE — U0004 COV-19 TEST NON-CDC HGH THRU: HCPCS | Performed by: PHYSICIAN ASSISTANT

## 2021-05-11 PROCEDURE — 87040 BLOOD CULTURE FOR BACTERIA: CPT | Performed by: PHYSICIAN ASSISTANT

## 2021-05-11 PROCEDURE — 25010000002 VANCOMYCIN 10 G RECONSTITUTED SOLUTION: Performed by: INTERNAL MEDICINE

## 2021-05-11 PROCEDURE — 83605 ASSAY OF LACTIC ACID: CPT | Performed by: PHYSICIAN ASSISTANT

## 2021-05-11 PROCEDURE — A9577 INJ MULTIHANCE: HCPCS | Performed by: INTERNAL MEDICINE

## 2021-05-11 PROCEDURE — 73720 MRI LWR EXTREMITY W/O&W/DYE: CPT

## 2021-05-11 PROCEDURE — 87070 CULTURE OTHR SPECIMN AEROBIC: CPT | Performed by: PHYSICIAN ASSISTANT

## 2021-05-11 PROCEDURE — 0 GADOBENATE DIMEGLUMINE 529 MG/ML SOLUTION: Performed by: INTERNAL MEDICINE

## 2021-05-11 PROCEDURE — 63710000001 INSULIN LISPRO (HUMAN) PER 5 UNITS: Performed by: INTERNAL MEDICINE

## 2021-05-11 PROCEDURE — 25010000002 ENOXAPARIN PER 10 MG: Performed by: NURSE PRACTITIONER

## 2021-05-11 PROCEDURE — 25010000002 VANCOMYCIN 10 G RECONSTITUTED SOLUTION: Performed by: PHYSICIAN ASSISTANT

## 2021-05-11 PROCEDURE — 63710000001 INSULIN GLARGINE PER 5 UNITS: Performed by: INTERNAL MEDICINE

## 2021-05-11 PROCEDURE — 83036 HEMOGLOBIN GLYCOSYLATED A1C: CPT | Performed by: NURSE PRACTITIONER

## 2021-05-11 PROCEDURE — 80048 BASIC METABOLIC PNL TOTAL CA: CPT | Performed by: NURSE PRACTITIONER

## 2021-05-11 PROCEDURE — 84132 ASSAY OF SERUM POTASSIUM: CPT | Performed by: INTERNAL MEDICINE

## 2021-05-11 PROCEDURE — 84145 PROCALCITONIN (PCT): CPT | Performed by: NURSE PRACTITIONER

## 2021-05-11 PROCEDURE — 82962 GLUCOSE BLOOD TEST: CPT

## 2021-05-11 RX ORDER — ONDANSETRON 2 MG/ML
4 INJECTION INTRAMUSCULAR; INTRAVENOUS EVERY 6 HOURS PRN
Status: DISCONTINUED | OUTPATIENT
Start: 2021-05-11 | End: 2021-05-13 | Stop reason: HOSPADM

## 2021-05-11 RX ORDER — PANTOPRAZOLE SODIUM 40 MG/1
40 TABLET, DELAYED RELEASE ORAL EVERY MORNING
Refills: 3 | Status: DISCONTINUED | OUTPATIENT
Start: 2021-05-11 | End: 2021-05-13 | Stop reason: HOSPADM

## 2021-05-11 RX ORDER — ALUMINA, MAGNESIA, AND SIMETHICONE 2400; 2400; 240 MG/30ML; MG/30ML; MG/30ML
15 SUSPENSION ORAL EVERY 6 HOURS PRN
Status: DISCONTINUED | OUTPATIENT
Start: 2021-05-11 | End: 2021-05-13 | Stop reason: HOSPADM

## 2021-05-11 RX ORDER — POTASSIUM CHLORIDE 750 MG/1
40 TABLET, FILM COATED, EXTENDED RELEASE ORAL AS NEEDED
Status: DISCONTINUED | OUTPATIENT
Start: 2021-05-11 | End: 2021-05-13 | Stop reason: HOSPADM

## 2021-05-11 RX ORDER — DEXTROSE MONOHYDRATE 25 G/50ML
25 INJECTION, SOLUTION INTRAVENOUS
Status: DISCONTINUED | OUTPATIENT
Start: 2021-05-11 | End: 2021-05-13 | Stop reason: HOSPADM

## 2021-05-11 RX ORDER — AMLODIPINE BESYLATE 5 MG/1
5 TABLET ORAL NIGHTLY
Status: CANCELLED | OUTPATIENT
Start: 2021-05-11

## 2021-05-11 RX ORDER — PANTOPRAZOLE SODIUM 40 MG/1
40 TABLET, DELAYED RELEASE ORAL EVERY MORNING
Refills: 3 | Status: CANCELLED | OUTPATIENT
Start: 2021-05-11

## 2021-05-11 RX ORDER — ESCITALOPRAM OXALATE 20 MG/1
20 TABLET ORAL NIGHTLY
Status: DISCONTINUED | OUTPATIENT
Start: 2021-05-11 | End: 2021-05-13 | Stop reason: HOSPADM

## 2021-05-11 RX ORDER — INSULIN GLARGINE 100 [IU]/ML
30 INJECTION, SOLUTION SUBCUTANEOUS NIGHTLY
Status: DISCONTINUED | OUTPATIENT
Start: 2021-05-11 | End: 2021-05-12

## 2021-05-11 RX ORDER — INSULIN LISPRO 100 [IU]/ML
0-14 INJECTION, SOLUTION INTRAVENOUS; SUBCUTANEOUS
Status: DISCONTINUED | OUTPATIENT
Start: 2021-05-11 | End: 2021-05-13 | Stop reason: HOSPADM

## 2021-05-11 RX ORDER — SODIUM CHLORIDE 9 MG/ML
100 INJECTION, SOLUTION INTRAVENOUS CONTINUOUS
Status: DISCONTINUED | OUTPATIENT
Start: 2021-05-11 | End: 2021-05-12

## 2021-05-11 RX ORDER — ACETAMINOPHEN 325 MG/1
650 TABLET ORAL EVERY 4 HOURS PRN
Status: DISCONTINUED | OUTPATIENT
Start: 2021-05-11 | End: 2021-05-12

## 2021-05-11 RX ORDER — LORAZEPAM 0.5 MG/1
0.5 TABLET ORAL ONCE AS NEEDED
Status: COMPLETED | OUTPATIENT
Start: 2021-05-11 | End: 2021-05-11

## 2021-05-11 RX ORDER — SODIUM CHLORIDE 0.9 % (FLUSH) 0.9 %
10 SYRINGE (ML) INJECTION AS NEEDED
Status: DISCONTINUED | OUTPATIENT
Start: 2021-05-11 | End: 2021-05-13 | Stop reason: HOSPADM

## 2021-05-11 RX ORDER — NICOTINE POLACRILEX 4 MG
15 LOZENGE BUCCAL
Status: DISCONTINUED | OUTPATIENT
Start: 2021-05-11 | End: 2021-05-13 | Stop reason: HOSPADM

## 2021-05-11 RX ORDER — ROSUVASTATIN CALCIUM 20 MG/1
20 TABLET, COATED ORAL NIGHTLY
Status: DISCONTINUED | OUTPATIENT
Start: 2021-05-11 | End: 2021-05-13 | Stop reason: HOSPADM

## 2021-05-11 RX ORDER — INSULIN LISPRO 100 [IU]/ML
0-9 INJECTION, SOLUTION INTRAVENOUS; SUBCUTANEOUS
Status: DISCONTINUED | OUTPATIENT
Start: 2021-05-11 | End: 2021-05-11

## 2021-05-11 RX ORDER — ROSUVASTATIN CALCIUM 20 MG/1
20 TABLET, COATED ORAL NIGHTLY
Status: CANCELLED | OUTPATIENT
Start: 2021-05-11

## 2021-05-11 RX ORDER — POTASSIUM CHLORIDE 1.5 G/1.77G
40 POWDER, FOR SOLUTION ORAL AS NEEDED
Status: DISCONTINUED | OUTPATIENT
Start: 2021-05-11 | End: 2021-05-13 | Stop reason: HOSPADM

## 2021-05-11 RX ORDER — ONDANSETRON 4 MG/1
4 TABLET, FILM COATED ORAL EVERY 6 HOURS PRN
Status: DISCONTINUED | OUTPATIENT
Start: 2021-05-11 | End: 2021-05-13 | Stop reason: HOSPADM

## 2021-05-11 RX ORDER — ESCITALOPRAM OXALATE 20 MG/1
20 TABLET ORAL NIGHTLY
Status: CANCELLED | OUTPATIENT
Start: 2021-05-11

## 2021-05-11 RX ORDER — AMLODIPINE BESYLATE 5 MG/1
5 TABLET ORAL NIGHTLY
Status: DISCONTINUED | OUTPATIENT
Start: 2021-05-11 | End: 2021-05-13 | Stop reason: HOSPADM

## 2021-05-11 RX ORDER — INSULIN GLARGINE 100 [IU]/ML
26 INJECTION, SOLUTION SUBCUTANEOUS NIGHTLY
Status: DISCONTINUED | OUTPATIENT
Start: 2021-05-11 | End: 2021-05-11

## 2021-05-11 RX ADMIN — ENOXAPARIN SODIUM 40 MG: 40 INJECTION SUBCUTANEOUS at 08:26

## 2021-05-11 RX ADMIN — AMLODIPINE BESYLATE 5 MG: 5 TABLET ORAL at 21:47

## 2021-05-11 RX ADMIN — INSULIN LISPRO 2 UNITS: 100 INJECTION, SOLUTION INTRAVENOUS; SUBCUTANEOUS at 08:26

## 2021-05-11 RX ADMIN — ROSUVASTATIN CALCIUM 20 MG: 20 TABLET, FILM COATED ORAL at 21:47

## 2021-05-11 RX ADMIN — GADOBENATE DIMEGLUMINE 20 ML: 529 INJECTION, SOLUTION INTRAVENOUS at 20:37

## 2021-05-11 RX ADMIN — SODIUM CHLORIDE 100 ML/HR: 9 INJECTION, SOLUTION INTRAVENOUS at 04:56

## 2021-05-11 RX ADMIN — INSULIN GLARGINE 30 UNITS: 100 INJECTION, SOLUTION SUBCUTANEOUS at 21:44

## 2021-05-11 RX ADMIN — TAZOBACTAM SODIUM AND PIPERACILLIN SODIUM 4.5 G: 500; 4 INJECTION, SOLUTION INTRAVENOUS at 07:33

## 2021-05-11 RX ADMIN — TAZOBACTAM SODIUM AND PIPERACILLIN SODIUM 4.5 G: 500; 4 INJECTION, SOLUTION INTRAVENOUS at 15:35

## 2021-05-11 RX ADMIN — ESCITALOPRAM 20 MG: 20 TABLET, FILM COATED ORAL at 21:47

## 2021-05-11 RX ADMIN — INSULIN LISPRO 5 UNITS: 100 INJECTION, SOLUTION INTRAVENOUS; SUBCUTANEOUS at 17:26

## 2021-05-11 RX ADMIN — HYDROCHLOROTHIAZIDE: 12.5 CAPSULE ORAL at 09:48

## 2021-05-11 RX ADMIN — TAZOBACTAM SODIUM AND PIPERACILLIN SODIUM 4.5 G: 500; 4 INJECTION, SOLUTION INTRAVENOUS at 22:42

## 2021-05-11 RX ADMIN — LORAZEPAM 0.5 MG: 0.5 TABLET ORAL at 19:33

## 2021-05-11 RX ADMIN — POTASSIUM CHLORIDE 40 MEQ: 750 TABLET, EXTENDED RELEASE ORAL at 16:00

## 2021-05-11 RX ADMIN — VANCOMYCIN HYDROCHLORIDE 3000 MG: 10 INJECTION, POWDER, LYOPHILIZED, FOR SOLUTION INTRAVENOUS at 01:21

## 2021-05-11 RX ADMIN — VANCOMYCIN HYDROCHLORIDE 1500 MG: 10 INJECTION, POWDER, LYOPHILIZED, FOR SOLUTION INTRAVENOUS at 15:11

## 2021-05-11 RX ADMIN — POTASSIUM CHLORIDE 40 MEQ: 750 TABLET, EXTENDED RELEASE ORAL at 09:48

## 2021-05-11 RX ADMIN — TAZOBACTAM SODIUM AND PIPERACILLIN SODIUM 4.5 G: 500; 4 INJECTION, SOLUTION INTRAVENOUS at 00:22

## 2021-05-11 RX ADMIN — PANTOPRAZOLE SODIUM 40 MG: 40 TABLET, DELAYED RELEASE ORAL at 07:33

## 2021-05-11 RX ADMIN — INSULIN LISPRO 5 UNITS: 100 INJECTION, SOLUTION INTRAVENOUS; SUBCUTANEOUS at 21:44

## 2021-05-11 RX ADMIN — INSULIN LISPRO 5 UNITS: 100 INJECTION, SOLUTION INTRAVENOUS; SUBCUTANEOUS at 12:15

## 2021-05-11 RX ADMIN — SODIUM CHLORIDE 1000 ML: 9 INJECTION, SOLUTION INTRAVENOUS at 00:22

## 2021-05-12 ENCOUNTER — ANESTHESIA EVENT (OUTPATIENT)
Dept: PERIOP | Facility: HOSPITAL | Age: 46
End: 2021-05-12

## 2021-05-12 ENCOUNTER — ANESTHESIA (OUTPATIENT)
Dept: PERIOP | Facility: HOSPITAL | Age: 46
End: 2021-05-12

## 2021-05-12 PROBLEM — S91.102A OPEN WOUND OF LEFT GREAT TOE: Status: ACTIVE | Noted: 2021-05-10

## 2021-05-12 LAB
ANION GAP SERPL CALCULATED.3IONS-SCNC: 6.1 MMOL/L (ref 5–15)
BUN SERPL-MCNC: 8 MG/DL (ref 6–20)
BUN/CREAT SERPL: 7.5 (ref 7–25)
CALCIUM SPEC-SCNC: 8.3 MG/DL (ref 8.6–10.5)
CHLORIDE SERPL-SCNC: 103 MMOL/L (ref 98–107)
CO2 SERPL-SCNC: 24.9 MMOL/L (ref 22–29)
CREAT SERPL-MCNC: 1.07 MG/DL (ref 0.76–1.27)
DEPRECATED RDW RBC AUTO: 36.7 FL (ref 37–54)
ERYTHROCYTE [DISTWIDTH] IN BLOOD BY AUTOMATED COUNT: 11.9 % (ref 12.3–15.4)
GFR SERPL CREATININE-BSD FRML MDRD: 75 ML/MIN/1.73
GLUCOSE BLDC GLUCOMTR-MCNC: 155 MG/DL (ref 70–130)
GLUCOSE BLDC GLUCOMTR-MCNC: 164 MG/DL (ref 70–130)
GLUCOSE BLDC GLUCOMTR-MCNC: 188 MG/DL (ref 70–130)
GLUCOSE BLDC GLUCOMTR-MCNC: 204 MG/DL (ref 70–130)
GLUCOSE BLDC GLUCOMTR-MCNC: 212 MG/DL (ref 70–130)
GLUCOSE SERPL-MCNC: 193 MG/DL (ref 65–99)
HCT VFR BLD AUTO: 36.5 % (ref 37.5–51)
HGB BLD-MCNC: 12.5 G/DL (ref 13–17.7)
MCH RBC QN AUTO: 29.4 PG (ref 26.6–33)
MCHC RBC AUTO-ENTMCNC: 34.2 G/DL (ref 31.5–35.7)
MCV RBC AUTO: 85.9 FL (ref 79–97)
PLATELET # BLD AUTO: 179 10*3/MM3 (ref 140–450)
PMV BLD AUTO: 9.6 FL (ref 6–12)
POTASSIUM SERPL-SCNC: 4.3 MMOL/L (ref 3.5–5.2)
RBC # BLD AUTO: 4.25 10*6/MM3 (ref 4.14–5.8)
SODIUM SERPL-SCNC: 134 MMOL/L (ref 136–145)
VANCOMYCIN TROUGH SERPL-MCNC: 9.1 MCG/ML (ref 5–20)
WBC # BLD AUTO: 8.4 10*3/MM3 (ref 3.4–10.8)

## 2021-05-12 PROCEDURE — 25010000002 ONDANSETRON PER 1 MG: Performed by: NURSE PRACTITIONER

## 2021-05-12 PROCEDURE — 25010000003 CEFAZOLIN PER 500 MG: Performed by: SURGERY

## 2021-05-12 PROCEDURE — 25010000002 VANCOMYCIN 10 G RECONSTITUTED SOLUTION: Performed by: INTERNAL MEDICINE

## 2021-05-12 PROCEDURE — 63710000001 INSULIN GLARGINE PER 5 UNITS: Performed by: SURGERY

## 2021-05-12 PROCEDURE — 85027 COMPLETE CBC AUTOMATED: CPT | Performed by: INTERNAL MEDICINE

## 2021-05-12 PROCEDURE — 80202 ASSAY OF VANCOMYCIN: CPT | Performed by: INTERNAL MEDICINE

## 2021-05-12 PROCEDURE — 63710000001 INSULIN LISPRO (HUMAN) PER 5 UNITS: Performed by: SURGERY

## 2021-05-12 PROCEDURE — 82962 GLUCOSE BLOOD TEST: CPT

## 2021-05-12 PROCEDURE — 99254 IP/OBS CNSLTJ NEW/EST MOD 60: CPT | Performed by: INTERNAL MEDICINE

## 2021-05-12 PROCEDURE — 25010000002 PIPERACILLIN SOD-TAZOBACTAM PER 1 G: Performed by: NURSE PRACTITIONER

## 2021-05-12 PROCEDURE — 0JBR0ZZ EXCISION OF LEFT FOOT SUBCUTANEOUS TISSUE AND FASCIA, OPEN APPROACH: ICD-10-PCS | Performed by: SURGERY

## 2021-05-12 PROCEDURE — 25010000002 FENTANYL CITRATE (PF) 100 MCG/2ML SOLUTION: Performed by: ANESTHESIOLOGY

## 2021-05-12 PROCEDURE — 25010000002 ROPIVACAINE PER 1 MG: Performed by: ANESTHESIOLOGY

## 2021-05-12 PROCEDURE — 63710000001 INSULIN LISPRO (HUMAN) PER 5 UNITS: Performed by: INTERNAL MEDICINE

## 2021-05-12 PROCEDURE — 25010000002 MIDAZOLAM PER 1 MG: Performed by: ANESTHESIOLOGY

## 2021-05-12 PROCEDURE — 25010000002 VANCOMYCIN 1 G RECONSTITUTED SOLUTION: Performed by: SURGERY

## 2021-05-12 PROCEDURE — 25010000003 CEFTRIAXONE PER 250 MG: Performed by: INTERNAL MEDICINE

## 2021-05-12 PROCEDURE — 25010000002 PROPOFOL 10 MG/ML EMULSION: Performed by: NURSE ANESTHETIST, CERTIFIED REGISTERED

## 2021-05-12 PROCEDURE — 25010000003 LIDOCAINE 1 % SOLUTION 20 ML VIAL: Performed by: SURGERY

## 2021-05-12 PROCEDURE — 80048 BASIC METABOLIC PNL TOTAL CA: CPT | Performed by: INTERNAL MEDICINE

## 2021-05-12 RX ORDER — LIDOCAINE HYDROCHLORIDE 20 MG/ML
INJECTION, SOLUTION INFILTRATION; PERINEURAL AS NEEDED
Status: DISCONTINUED | OUTPATIENT
Start: 2021-05-12 | End: 2021-05-12 | Stop reason: SURG

## 2021-05-12 RX ORDER — FAMOTIDINE 10 MG/ML
20 INJECTION, SOLUTION INTRAVENOUS ONCE
Status: COMPLETED | OUTPATIENT
Start: 2021-05-12 | End: 2021-05-12

## 2021-05-12 RX ORDER — HYDROCODONE BITARTRATE AND ACETAMINOPHEN 7.5; 325 MG/1; MG/1
1 TABLET ORAL ONCE AS NEEDED
Status: DISCONTINUED | OUTPATIENT
Start: 2021-05-12 | End: 2021-05-12 | Stop reason: HOSPADM

## 2021-05-12 RX ORDER — NALOXONE HCL 0.4 MG/ML
0.2 VIAL (ML) INJECTION AS NEEDED
Status: DISCONTINUED | OUTPATIENT
Start: 2021-05-12 | End: 2021-05-12 | Stop reason: HOSPADM

## 2021-05-12 RX ORDER — INSULIN GLARGINE 100 [IU]/ML
35 INJECTION, SOLUTION SUBCUTANEOUS NIGHTLY
Status: DISCONTINUED | OUTPATIENT
Start: 2021-05-12 | End: 2021-05-13 | Stop reason: HOSPADM

## 2021-05-12 RX ORDER — FLUMAZENIL 0.1 MG/ML
0.2 INJECTION INTRAVENOUS AS NEEDED
Status: DISCONTINUED | OUTPATIENT
Start: 2021-05-12 | End: 2021-05-12 | Stop reason: HOSPADM

## 2021-05-12 RX ORDER — LIDOCAINE HYDROCHLORIDE 10 MG/ML
0.5 INJECTION, SOLUTION EPIDURAL; INFILTRATION; INTRACAUDAL; PERINEURAL ONCE AS NEEDED
Status: DISCONTINUED | OUTPATIENT
Start: 2021-05-12 | End: 2021-05-12 | Stop reason: HOSPADM

## 2021-05-12 RX ORDER — DIPHENHYDRAMINE HYDROCHLORIDE 50 MG/ML
12.5 INJECTION INTRAMUSCULAR; INTRAVENOUS
Status: DISCONTINUED | OUTPATIENT
Start: 2021-05-12 | End: 2021-05-12 | Stop reason: HOSPADM

## 2021-05-12 RX ORDER — SODIUM CHLORIDE, SODIUM LACTATE, POTASSIUM CHLORIDE, CALCIUM CHLORIDE 600; 310; 30; 20 MG/100ML; MG/100ML; MG/100ML; MG/100ML
9 INJECTION, SOLUTION INTRAVENOUS CONTINUOUS
Status: DISCONTINUED | OUTPATIENT
Start: 2021-05-12 | End: 2021-05-13

## 2021-05-12 RX ORDER — ONDANSETRON 2 MG/ML
4 INJECTION INTRAMUSCULAR; INTRAVENOUS ONCE AS NEEDED
Status: DISCONTINUED | OUTPATIENT
Start: 2021-05-12 | End: 2021-05-12 | Stop reason: HOSPADM

## 2021-05-12 RX ORDER — DIPHENHYDRAMINE HCL 25 MG
25 CAPSULE ORAL
Status: DISCONTINUED | OUTPATIENT
Start: 2021-05-12 | End: 2021-05-12 | Stop reason: HOSPADM

## 2021-05-12 RX ORDER — CEFTRIAXONE SODIUM 2 G/50ML
2 INJECTION, SOLUTION INTRAVENOUS EVERY 24 HOURS
Status: DISCONTINUED | OUTPATIENT
Start: 2021-05-12 | End: 2021-05-13

## 2021-05-12 RX ORDER — ACETAMINOPHEN 500 MG
1000 TABLET ORAL EVERY 6 HOURS SCHEDULED
Status: DISCONTINUED | OUTPATIENT
Start: 2021-05-12 | End: 2021-05-13 | Stop reason: HOSPADM

## 2021-05-12 RX ORDER — FENTANYL CITRATE 50 UG/ML
INJECTION, SOLUTION INTRAMUSCULAR; INTRAVENOUS
Status: COMPLETED | OUTPATIENT
Start: 2021-05-12 | End: 2021-05-12

## 2021-05-12 RX ORDER — MIDAZOLAM HYDROCHLORIDE 1 MG/ML
INJECTION INTRAMUSCULAR; INTRAVENOUS
Status: COMPLETED | OUTPATIENT
Start: 2021-05-12 | End: 2021-05-12

## 2021-05-12 RX ORDER — HYDRALAZINE HYDROCHLORIDE 20 MG/ML
5 INJECTION INTRAMUSCULAR; INTRAVENOUS
Status: DISCONTINUED | OUTPATIENT
Start: 2021-05-12 | End: 2021-05-12 | Stop reason: HOSPADM

## 2021-05-12 RX ORDER — FENTANYL CITRATE 50 UG/ML
50 INJECTION, SOLUTION INTRAMUSCULAR; INTRAVENOUS
Status: DISCONTINUED | OUTPATIENT
Start: 2021-05-12 | End: 2021-05-12 | Stop reason: HOSPADM

## 2021-05-12 RX ORDER — EPHEDRINE SULFATE 50 MG/ML
5 INJECTION, SOLUTION INTRAVENOUS ONCE AS NEEDED
Status: DISCONTINUED | OUTPATIENT
Start: 2021-05-12 | End: 2021-05-12 | Stop reason: HOSPADM

## 2021-05-12 RX ORDER — SODIUM HYPOCHLORITE 1.25 MG/ML
SOLUTION TOPICAL 2 TIMES DAILY
Status: DISCONTINUED | OUTPATIENT
Start: 2021-05-13 | End: 2021-05-13 | Stop reason: SDUPTHER

## 2021-05-12 RX ORDER — LABETALOL HYDROCHLORIDE 5 MG/ML
5 INJECTION, SOLUTION INTRAVENOUS
Status: DISCONTINUED | OUTPATIENT
Start: 2021-05-12 | End: 2021-05-12 | Stop reason: HOSPADM

## 2021-05-12 RX ORDER — OXYCODONE HYDROCHLORIDE 5 MG/1
5 TABLET ORAL EVERY 4 HOURS PRN
Status: DISCONTINUED | OUTPATIENT
Start: 2021-05-12 | End: 2021-05-13 | Stop reason: HOSPADM

## 2021-05-12 RX ORDER — ROPIVACAINE HYDROCHLORIDE 5 MG/ML
INJECTION, SOLUTION EPIDURAL; INFILTRATION; PERINEURAL
Status: COMPLETED | OUTPATIENT
Start: 2021-05-12 | End: 2021-05-12

## 2021-05-12 RX ORDER — SODIUM CHLORIDE 0.9 % (FLUSH) 0.9 %
3-10 SYRINGE (ML) INJECTION AS NEEDED
Status: DISCONTINUED | OUTPATIENT
Start: 2021-05-12 | End: 2021-05-12 | Stop reason: HOSPADM

## 2021-05-12 RX ORDER — SODIUM CHLORIDE 0.9 % (FLUSH) 0.9 %
3 SYRINGE (ML) INJECTION EVERY 12 HOURS SCHEDULED
Status: DISCONTINUED | OUTPATIENT
Start: 2021-05-12 | End: 2021-05-12 | Stop reason: HOSPADM

## 2021-05-12 RX ORDER — OXYCODONE AND ACETAMINOPHEN 7.5; 325 MG/1; MG/1
1 TABLET ORAL ONCE AS NEEDED
Status: DISCONTINUED | OUTPATIENT
Start: 2021-05-12 | End: 2021-05-12 | Stop reason: HOSPADM

## 2021-05-12 RX ORDER — PROPOFOL 10 MG/ML
VIAL (ML) INTRAVENOUS AS NEEDED
Status: DISCONTINUED | OUTPATIENT
Start: 2021-05-12 | End: 2021-05-12 | Stop reason: SURG

## 2021-05-12 RX ORDER — HYDROMORPHONE HYDROCHLORIDE 1 MG/ML
0.5 INJECTION, SOLUTION INTRAMUSCULAR; INTRAVENOUS; SUBCUTANEOUS
Status: DISCONTINUED | OUTPATIENT
Start: 2021-05-12 | End: 2021-05-12 | Stop reason: HOSPADM

## 2021-05-12 RX ORDER — VANCOMYCIN HYDROCHLORIDE 1 G/20ML
INJECTION, POWDER, LYOPHILIZED, FOR SOLUTION INTRAVENOUS AS NEEDED
Status: DISCONTINUED | OUTPATIENT
Start: 2021-05-12 | End: 2021-05-12 | Stop reason: HOSPADM

## 2021-05-12 RX ORDER — PROMETHAZINE HYDROCHLORIDE 25 MG/1
25 TABLET ORAL ONCE AS NEEDED
Status: DISCONTINUED | OUTPATIENT
Start: 2021-05-12 | End: 2021-05-12 | Stop reason: HOSPADM

## 2021-05-12 RX ORDER — MIDAZOLAM HYDROCHLORIDE 1 MG/ML
1 INJECTION INTRAMUSCULAR; INTRAVENOUS
Status: DISCONTINUED | OUTPATIENT
Start: 2021-05-12 | End: 2021-05-12 | Stop reason: HOSPADM

## 2021-05-12 RX ORDER — PROMETHAZINE HYDROCHLORIDE 25 MG/1
25 SUPPOSITORY RECTAL ONCE AS NEEDED
Status: DISCONTINUED | OUTPATIENT
Start: 2021-05-12 | End: 2021-05-12 | Stop reason: HOSPADM

## 2021-05-12 RX ADMIN — HYDROCHLOROTHIAZIDE: 12.5 CAPSULE ORAL at 08:20

## 2021-05-12 RX ADMIN — TAZOBACTAM SODIUM AND PIPERACILLIN SODIUM 4.5 G: 500; 4 INJECTION, SOLUTION INTRAVENOUS at 06:24

## 2021-05-12 RX ADMIN — FAMOTIDINE 20 MG: 10 INJECTION INTRAVENOUS at 12:30

## 2021-05-12 RX ADMIN — INSULIN LISPRO 5 UNITS: 100 INJECTION, SOLUTION INTRAVENOUS; SUBCUTANEOUS at 08:20

## 2021-05-12 RX ADMIN — VANCOMYCIN HYDROCHLORIDE 1250 MG: 10 INJECTION, POWDER, LYOPHILIZED, FOR SOLUTION INTRAVENOUS at 21:00

## 2021-05-12 RX ADMIN — PROPOFOL 50 MCG/KG/MIN: 10 INJECTION, EMULSION INTRAVENOUS at 13:10

## 2021-05-12 RX ADMIN — ROSUVASTATIN CALCIUM 20 MG: 20 TABLET, FILM COATED ORAL at 20:20

## 2021-05-12 RX ADMIN — SODIUM CHLORIDE, POTASSIUM CHLORIDE, SODIUM LACTATE AND CALCIUM CHLORIDE 9 ML/HR: 600; 310; 30; 20 INJECTION, SOLUTION INTRAVENOUS at 12:32

## 2021-05-12 RX ADMIN — ROPIVACAINE HYDROCHLORIDE 30 ML: 5 INJECTION, SOLUTION EPIDURAL; INFILTRATION; PERINEURAL at 12:30

## 2021-05-12 RX ADMIN — OXYCODONE 5 MG: 5 TABLET ORAL at 19:57

## 2021-05-12 RX ADMIN — VANCOMYCIN HYDROCHLORIDE 1500 MG: 10 INJECTION, POWDER, LYOPHILIZED, FOR SOLUTION INTRAVENOUS at 12:32

## 2021-05-12 RX ADMIN — ACETAMINOPHEN 1000 MG: 500 TABLET, FILM COATED ORAL at 17:20

## 2021-05-12 RX ADMIN — PANTOPRAZOLE SODIUM 40 MG: 40 TABLET, DELAYED RELEASE ORAL at 06:25

## 2021-05-12 RX ADMIN — FENTANYL CITRATE 50 MCG: 50 INJECTION INTRAMUSCULAR; INTRAVENOUS at 12:28

## 2021-05-12 RX ADMIN — AMLODIPINE BESYLATE 5 MG: 5 TABLET ORAL at 20:20

## 2021-05-12 RX ADMIN — ESCITALOPRAM 20 MG: 20 TABLET, FILM COATED ORAL at 20:20

## 2021-05-12 RX ADMIN — INSULIN GLARGINE 35 UNITS: 100 INJECTION, SOLUTION SUBCUTANEOUS at 20:24

## 2021-05-12 RX ADMIN — ONDANSETRON HYDROCHLORIDE 4 MG: 2 SOLUTION INTRAMUSCULAR; INTRAVENOUS at 13:10

## 2021-05-12 RX ADMIN — LIDOCAINE HYDROCHLORIDE 60 MG: 20 INJECTION, SOLUTION INFILTRATION; PERINEURAL at 13:10

## 2021-05-12 RX ADMIN — INSULIN LISPRO 5 UNITS: 100 INJECTION, SOLUTION INTRAVENOUS; SUBCUTANEOUS at 17:20

## 2021-05-12 RX ADMIN — VANCOMYCIN HYDROCHLORIDE 1500 MG: 10 INJECTION, POWDER, LYOPHILIZED, FOR SOLUTION INTRAVENOUS at 01:36

## 2021-05-12 RX ADMIN — PROPOFOL 50 MG: 10 INJECTION, EMULSION INTRAVENOUS at 13:11

## 2021-05-12 RX ADMIN — MIDAZOLAM 1 MG: 1 INJECTION INTRAMUSCULAR; INTRAVENOUS at 12:29

## 2021-05-12 RX ADMIN — CEFTRIAXONE SODIUM 2 G: 2 INJECTION, SOLUTION INTRAVENOUS at 17:20

## 2021-05-12 NOTE — ANESTHESIA PREPROCEDURE EVALUATION
Anesthesia Evaluation     Patient summary reviewed and Nursing notes reviewed   history of anesthetic complications (Chills pt relates to anesthesia):  NPO Solid Status: > 6 hours             Airway   Mallampati: III  TM distance: >3 FB  Neck ROM: full  Large neck circumference and Possible difficult intubation  Dental          Pulmonary - normal exam   (+) a smoker Former, sleep apnea,   Cardiovascular - normal exam    ECG reviewed    (+) hypertension, hyperlipidemia,   (-) angina      Neuro/Psych  (+) psychiatric history Anxiety,     GI/Hepatic/Renal/Endo    (+) obesity, morbid obesity, GERD,  diabetes mellitus,     Musculoskeletal     Abdominal    Substance History      OB/GYN          Other                          Anesthesia Plan    ASA 3     MAC       Anesthetic plan, all risks, benefits, and alternatives have been provided, discussed and informed consent has been obtained with: patient.

## 2021-05-12 NOTE — ANESTHESIA POSTPROCEDURE EVALUATION
"Patient: Ray London    Procedure Summary     Date: 05/12/21 Room / Location: [facility] OR  / [facility] MAIN OR    Anesthesia Start: 1310 Anesthesia Stop: 1354    Procedure: DEBRIDEMENT FOOT (Left ) Diagnosis:       Cellulitis of left lower extremity      Open wound of left great toe, initial encounter      (Cellulitis of left lower extremity [L03.116])      (Open wound of left great toe, initial encounter [S91.102A])    Surgeons: Chacho Verdugo MD Provider: Zac Leahy MD    Anesthesia Type: MAC ASA Status: 3          Anesthesia Type: MAC    Vitals  Vitals Value Taken Time   /95 05/12/21 1420   Temp 36.6 °C (97.8 °F) 05/12/21 1352   Pulse 67 05/12/21 1422   Resp 16 05/12/21 1400   SpO2 99 % 05/12/21 1422   Vitals shown include unvalidated device data.        Post Anesthesia Care and Evaluation    Patient location during evaluation: bedside  Patient participation: complete - patient participated  Level of consciousness: awake and alert  Pain management: adequate  Airway patency: patent  Anesthetic complications: No anesthetic complications    Cardiovascular status: acceptable  Respiratory status: acceptable  Hydration status: acceptable    Comments: /98   Pulse 67   Temp 36.6 °C (97.8 °F) (Oral)   Resp 16   Ht 182.9 cm (72.01\")   Wt (!) 185 kg (408 lb 6.4 oz)   SpO2 99%   BMI 55.32 kg/m²       "

## 2021-05-12 NOTE — ANESTHESIA PROCEDURE NOTES
Peripheral Block      Patient location during procedure: holding area  Start time: 5/12/2021 12:25 PM  Stop time: 5/12/2021 12:30 PM  Reason for block: at surgeon's request and post-op pain management  Performed by  Anesthesiologist: Zac Leahy MD  Preanesthetic Checklist  Completed: patient identified, IV checked, site marked, risks and benefits discussed, surgical consent, monitors and equipment checked, pre-op evaluation and timeout performed  Prep:  Pt Position: supine  Sterile barriers:cap and gloves  Prep: ChloraPrep  Patient monitoring: blood pressure monitoring, continuous pulse oximetry and EKG  Procedure  Sedation:yes  Performed under: local infiltration  Guidance:landmark technique  Images:still images obtained    Laterality:left  Block Type:ankle  Injection Technique:single-shot  Needle Type:long-bevel  Needle Gauge:20 G      Medications Used: ropivacaine (NAROPIN) 0.5 % injection, 30 mL  Med admintered at 5/12/2021 12:30 PM      Post Assessment  Injection Assessment: negative aspiration for heme, no paresthesia on injection and incremental injection  Patient Tolerance:comfortable throughout block  Complications:no

## 2021-05-13 ENCOUNTER — READMISSION MANAGEMENT (OUTPATIENT)
Dept: CALL CENTER | Facility: HOSPITAL | Age: 46
End: 2021-05-13

## 2021-05-13 VITALS
TEMPERATURE: 97.2 F | DIASTOLIC BLOOD PRESSURE: 90 MMHG | HEART RATE: 59 BPM | HEIGHT: 72 IN | RESPIRATION RATE: 18 BRPM | WEIGHT: 315 LBS | SYSTOLIC BLOOD PRESSURE: 144 MMHG | BODY MASS INDEX: 42.66 KG/M2 | OXYGEN SATURATION: 99 %

## 2021-05-13 DIAGNOSIS — E11.65 UNCONTROLLED TYPE 2 DIABETES MELLITUS WITH HYPERGLYCEMIA, WITHOUT LONG-TERM CURRENT USE OF INSULIN (HCC): Primary | ICD-10-CM

## 2021-05-13 LAB
ANION GAP SERPL CALCULATED.3IONS-SCNC: 8.1 MMOL/L (ref 5–15)
BACTERIA SPEC AEROBE CULT: ABNORMAL
BACTERIA SPEC AEROBE CULT: ABNORMAL
BUN SERPL-MCNC: 7 MG/DL (ref 6–20)
BUN/CREAT SERPL: 7.1 (ref 7–25)
CALCIUM SPEC-SCNC: 8.6 MG/DL (ref 8.6–10.5)
CHLORIDE SERPL-SCNC: 103 MMOL/L (ref 98–107)
CO2 SERPL-SCNC: 25.9 MMOL/L (ref 22–29)
CREAT SERPL-MCNC: 0.99 MG/DL (ref 0.76–1.27)
CRP SERPL-MCNC: 7.13 MG/DL (ref 0–0.5)
DEPRECATED RDW RBC AUTO: 38.4 FL (ref 37–54)
ERYTHROCYTE [DISTWIDTH] IN BLOOD BY AUTOMATED COUNT: 12 % (ref 12.3–15.4)
ERYTHROCYTE [SEDIMENTATION RATE] IN BLOOD: 41 MM/HR (ref 0–15)
GFR SERPL CREATININE-BSD FRML MDRD: 82 ML/MIN/1.73
GLUCOSE BLDC GLUCOMTR-MCNC: 163 MG/DL (ref 70–130)
GLUCOSE BLDC GLUCOMTR-MCNC: 211 MG/DL (ref 70–130)
GLUCOSE SERPL-MCNC: 155 MG/DL (ref 65–99)
GRAM STN SPEC: ABNORMAL
GRAM STN SPEC: ABNORMAL
HCT VFR BLD AUTO: 36.6 % (ref 37.5–51)
HGB BLD-MCNC: 12.3 G/DL (ref 13–17.7)
MCH RBC QN AUTO: 29.2 PG (ref 26.6–33)
MCHC RBC AUTO-ENTMCNC: 33.6 G/DL (ref 31.5–35.7)
MCV RBC AUTO: 86.9 FL (ref 79–97)
PLATELET # BLD AUTO: 185 10*3/MM3 (ref 140–450)
PMV BLD AUTO: 9.4 FL (ref 6–12)
POTASSIUM SERPL-SCNC: 4.3 MMOL/L (ref 3.5–5.2)
RBC # BLD AUTO: 4.21 10*6/MM3 (ref 4.14–5.8)
SODIUM SERPL-SCNC: 137 MMOL/L (ref 136–145)
STREP GROUPING: ABNORMAL
WBC # BLD AUTO: 6.61 10*3/MM3 (ref 3.4–10.8)

## 2021-05-13 PROCEDURE — 63710000001 INSULIN LISPRO (HUMAN) PER 5 UNITS: Performed by: SURGERY

## 2021-05-13 PROCEDURE — 85652 RBC SED RATE AUTOMATED: CPT | Performed by: SURGERY

## 2021-05-13 PROCEDURE — 85027 COMPLETE CBC AUTOMATED: CPT | Performed by: SURGERY

## 2021-05-13 PROCEDURE — 82962 GLUCOSE BLOOD TEST: CPT

## 2021-05-13 PROCEDURE — 80048 BASIC METABOLIC PNL TOTAL CA: CPT | Performed by: SURGERY

## 2021-05-13 PROCEDURE — 86140 C-REACTIVE PROTEIN: CPT | Performed by: SURGERY

## 2021-05-13 PROCEDURE — 25010000002 VANCOMYCIN 10 G RECONSTITUTED SOLUTION: Performed by: INTERNAL MEDICINE

## 2021-05-13 PROCEDURE — 99232 SBSQ HOSP IP/OBS MODERATE 35: CPT | Performed by: INTERNAL MEDICINE

## 2021-05-13 RX ORDER — CEPHALEXIN 500 MG/1
1000 CAPSULE ORAL EVERY 6 HOURS SCHEDULED
Status: DISCONTINUED | OUTPATIENT
Start: 2021-05-13 | End: 2021-05-13 | Stop reason: HOSPADM

## 2021-05-13 RX ORDER — SODIUM HYPOCHLORITE 1.25 MG/ML
SOLUTION TOPICAL 2 TIMES DAILY
Status: DISCONTINUED | OUTPATIENT
Start: 2021-05-13 | End: 2021-05-13 | Stop reason: HOSPADM

## 2021-05-13 RX ORDER — OXYCODONE HYDROCHLORIDE 5 MG/1
5 TABLET ORAL EVERY 4 HOURS PRN
Qty: 15 TABLET | Refills: 0 | Status: SHIPPED | OUTPATIENT
Start: 2021-05-13 | End: 2021-10-18 | Stop reason: SDDI

## 2021-05-13 RX ORDER — BLOOD-GLUCOSE METER
1 KIT MISCELLANEOUS AS NEEDED
Qty: 1 EACH | Refills: 0 | Status: SHIPPED | OUTPATIENT
Start: 2021-05-13 | End: 2022-05-13

## 2021-05-13 RX ORDER — CEPHALEXIN 500 MG/1
1000 CAPSULE ORAL EVERY 6 HOURS SCHEDULED
Qty: 56 CAPSULE | Refills: 0 | Status: SHIPPED | OUTPATIENT
Start: 2021-05-13 | End: 2021-05-20

## 2021-05-13 RX ORDER — SODIUM HYPOCHLORITE 1.25 MG/ML
SOLUTION TOPICAL
Qty: 473 ML | Refills: 0 | Status: SHIPPED | OUTPATIENT
Start: 2021-05-13 | End: 2021-10-18

## 2021-05-13 RX ADMIN — ACETAMINOPHEN 1000 MG: 500 TABLET, FILM COATED ORAL at 00:19

## 2021-05-13 RX ADMIN — ACETAMINOPHEN 1000 MG: 500 TABLET, FILM COATED ORAL at 12:02

## 2021-05-13 RX ADMIN — INSULIN LISPRO 5 UNITS: 100 INJECTION, SOLUTION INTRAVENOUS; SUBCUTANEOUS at 12:02

## 2021-05-13 RX ADMIN — INSULIN LISPRO 3 UNITS: 100 INJECTION, SOLUTION INTRAVENOUS; SUBCUTANEOUS at 08:37

## 2021-05-13 RX ADMIN — HYDROCHLOROTHIAZIDE: 12.5 CAPSULE ORAL at 08:37

## 2021-05-13 RX ADMIN — ACETAMINOPHEN 1000 MG: 500 TABLET, FILM COATED ORAL at 06:26

## 2021-05-13 RX ADMIN — DAKIN'S SOLUTION 0.125% (QUARTER STRENGTH): 0.12 SOLUTION at 08:37

## 2021-05-13 RX ADMIN — VANCOMYCIN HYDROCHLORIDE 1250 MG: 10 INJECTION, POWDER, LYOPHILIZED, FOR SOLUTION INTRAVENOUS at 05:01

## 2021-05-13 RX ADMIN — PANTOPRAZOLE SODIUM 40 MG: 40 TABLET, DELAYED RELEASE ORAL at 06:26

## 2021-05-13 NOTE — OUTREACH NOTE
Controlled Substance Refill Request for Tramadol  Problem List Complete:  No     PROVIDER TO CONSIDER COMPLETION OF PROBLEM LIST AND OVERVIEW/CONTROLLED SUBSTANCE AGREEMENT    Last Written Prescription Date:  2/9/18  Last Fill Quantity: 40,   # refills: 0    Last Office Visit with [facility] primary care provider: 9/14/17    Controlled substance agreement on file: No.     Processing:  Staff will hand deliver Rx to on-site pharmacy  PCP [place]  Thank you.     Prep Survey      Responses   [facility] patient discharged from?  [place]   Is LACE score < 7 ?  No   Emergency Room discharge w/ pulse ox?  No   Eligibility  [facility]   Date of Admission  05/10/21   Date of Discharge  05/13/21   Discharge Disposition  Home or Self Care   Discharge diagnosis  Open wound of left great toe,    debridement   Does the patient have one of the following disease processes/diagnoses(primary or secondary)?  Other   Does the patient have Home health ordered?  No   Is there a DME ordered?  No   Comments regarding appointments  f/u [facility] wound care center   Prep survey completed?  Yes          Brenda Ribeiro RN

## 2021-05-14 ENCOUNTER — TRANSITIONAL CARE MANAGEMENT TELEPHONE ENCOUNTER (OUTPATIENT)
Dept: CALL CENTER | Facility: HOSPITAL | Age: 46
End: 2021-05-14

## 2021-05-14 NOTE — OUTREACH NOTE
Call Center TCM Note      Responses   [facility] patient discharged from?  [place]   Does the patient have one of the following disease processes/diagnoses(primary or secondary)?  Other   TCM attempt successful?  No   Unsuccessful attempts  Attempt 1          Jenni Muhammad MA    5/14/2021, 14:49 EDT

## 2021-05-14 NOTE — OUTREACH NOTE
Call Center TCM Note      Responses   [facility] patient discharged from?  [place]   Does the patient have one of the following disease processes/diagnoses(primary or secondary)?  Other   TCM attempt successful?  No   Unsuccessful attempts  Attempt 2          Jenni Muhammad MA    5/14/2021, 16:22 EDT

## 2021-05-16 LAB
BACTERIA SPEC AEROBE CULT: NORMAL
BACTERIA SPEC AEROBE CULT: NORMAL

## 2021-05-17 ENCOUNTER — TRANSITIONAL CARE MANAGEMENT TELEPHONE ENCOUNTER (OUTPATIENT)
Dept: CALL CENTER | Facility: HOSPITAL | Age: 46
End: 2021-05-17

## 2021-05-17 RX ORDER — BLOOD-GLUCOSE METER
KIT MISCELLANEOUS
Qty: 1 EACH | Refills: 0 | Status: SHIPPED | OUTPATIENT
Start: 2021-05-17

## 2021-05-17 NOTE — OUTREACH NOTE
Case Management Call Center Follow-up      Responses   [facility] Call Center Tracking Reason?  Medication Affordability   Has the Call Center Case Management Follow-up issue been resolved?  Yes   Follow-up Comments  I called the [facility] pharmacy ([phone]) and they verified that the Freestyle Glucometer was not covered by his insurance.  I talked to shonna Moreno at [facility] and she said they would cover accucheck if the doctor can send a new script.  I talked to Dr. Singh and he sent over a new script.  I called and spoke to Ray and told him and left him my number in case he still has issues.          Shannon Epley, RN    5/17/2021, 15:26 EDT

## 2021-05-17 NOTE — OUTREACH NOTE
Call Center TCM Note      Responses   [facility] patient discharged from?  [place]   Does the patient have one of the following disease processes/diagnoses(primary or secondary)?  Other   TCM attempt successful?  Yes   Call start time  1203   Call end time  1208   Discharge diagnosis  Open wound of left great toe,    debridement   Person spoke with today (if not patient) and relationship  Dixie(Spouse)   Meds reviewed with patient/caregiver?  Yes   Is the patient having any side effects they believe may be caused by any medication additions or changes?  No   Does the patient have all medications ordered at discharge?  Yes   Is the patient taking all medications as directed (includes completed medication regime)?  Yes   Comments regarding appointments  Office visit with PCP RONALD Odom 5/26/21 @ 10am.   Does the patient have a primary care provider?   Yes   Does the patient have an appointment with their PCP within 7 days of discharge?  Greater than 7 days   Nursing Interventions  Verified appointment date/time/provider   Has the patient kept scheduled appointments due by today?  N/A   Has home health visited the patient within 72 hours of discharge?  N/A   Psychosocial issues?  No   Notified Case Management  -- [Spouse(Dixie) states pharmacy [facility] did not get a script for glucometer or strips. ]   Did the patient receive a copy of their discharge instructions?  Yes   Nursing interventions  Reviewed instructions with patient   What is the patient's perception of their health status since discharge?  Improving   Is the patient/caregiver able to teach back signs and symptoms related to disease process for when to call PCP?  Yes   Is the patient/caregiver able to teach back signs and symptoms related to disease process for when to call 911?  Yes   Is the patient/caregiver able to teach back the hierarchy of who to call/visit for symptoms/problems? PCP, Specialist, Home health nurse, Urgent Care, ED, 911  Yes    If the patient is a current smoker, are they able to teach back resources for cessation?  Not a smoker   TCM call completed?  Yes          Ольга Raya RN    5/17/2021, 12:13 EDT

## 2021-05-20 ENCOUNTER — OFFICE VISIT (OUTPATIENT)
Dept: WOUND CARE | Facility: HOSPITAL | Age: 46
End: 2021-05-20

## 2021-05-20 PROCEDURE — G0463 HOSPITAL OUTPT CLINIC VISIT: HCPCS

## 2021-05-21 RX ORDER — AMLODIPINE BESYLATE 5 MG/1
TABLET ORAL
Qty: 30 TABLET | Refills: 5 | Status: SHIPPED | OUTPATIENT
Start: 2021-05-21 | End: 2021-10-18 | Stop reason: SDUPTHER

## 2021-05-25 ENCOUNTER — READMISSION MANAGEMENT (OUTPATIENT)
Dept: CALL CENTER | Facility: HOSPITAL | Age: 46
End: 2021-05-25

## 2021-05-25 NOTE — OUTREACH NOTE
Medical Week 2 Survey      Responses   [facility] patient discharged from?  [place]   Does the patient have one of the following disease processes/diagnoses(primary or secondary)?  Other   Week 2 attempt successful?  Yes   Call start time  1052   Discharge diagnosis  Open wound of left great toe,    debridement   Call end time  1057   Is patient permission given to speak with other caregiver?  Yes   List who call center can speak with  wife   Medication alerts for this patient  finished abx   Meds reviewed with patient/caregiver?  Yes   Is the patient having any side effects they believe may be caused by any medication additions or changes?  No   Does the patient have all medications ordered at discharge?  Yes   Is the patient taking all medications as directed (includes completed medication regime)?  Yes   Does the patient have a primary care provider?   Yes   Has the patient kept scheduled appointments due by today?  Yes   Psychosocial issues?  No   Comments  Pt is going to wound care and has home supplies for dressings, Dakins solution. He denies pain even during the wound care. He reports redness/drainage is resolving. Glucose levels 190 in am under 160 rest of day. Pt has the boot/shoe that he wears in order to walk around. Was having nausea at home at first w/any activity but that is improving.    What is the patient's perception of their health status since discharge?  Improving   Is the patient/caregiver able to teach back the hierarchy of who to call/visit for symptoms/problems? PCP, Specialist, Home health nurse, Urgent Care, ED, 911  Yes   Week 2 Call Completed?  Yes          Sona Campos RN

## 2021-05-26 ENCOUNTER — OFFICE VISIT (OUTPATIENT)
Dept: FAMILY MEDICINE CLINIC | Facility: CLINIC | Age: 46
End: 2021-05-26

## 2021-05-26 VITALS
BODY MASS INDEX: 42.66 KG/M2 | HEIGHT: 72 IN | RESPIRATION RATE: 16 BRPM | DIASTOLIC BLOOD PRESSURE: 80 MMHG | HEART RATE: 69 BPM | TEMPERATURE: 97.5 F | SYSTOLIC BLOOD PRESSURE: 128 MMHG | OXYGEN SATURATION: 100 % | WEIGHT: 315 LBS

## 2021-05-26 DIAGNOSIS — E55.9 VITAMIN D DEFICIENCY: ICD-10-CM

## 2021-05-26 DIAGNOSIS — I10 ESSENTIAL HYPERTENSION: ICD-10-CM

## 2021-05-26 DIAGNOSIS — Z09 HOSPITAL DISCHARGE FOLLOW-UP: ICD-10-CM

## 2021-05-26 DIAGNOSIS — E11.65 TYPE 2 DIABETES MELLITUS WITH HYPERGLYCEMIA, WITH LONG-TERM CURRENT USE OF INSULIN (HCC): Primary | ICD-10-CM

## 2021-05-26 DIAGNOSIS — E78.2 MIXED HYPERLIPIDEMIA: ICD-10-CM

## 2021-05-26 DIAGNOSIS — Z79.4 TYPE 2 DIABETES MELLITUS WITH HYPERGLYCEMIA, WITH LONG-TERM CURRENT USE OF INSULIN (HCC): Primary | ICD-10-CM

## 2021-05-26 DIAGNOSIS — E53.8 FOLIC ACID DEFICIENCY: ICD-10-CM

## 2021-05-26 DIAGNOSIS — S91.102D OPEN WOUND OF LEFT GREAT TOE, SUBSEQUENT ENCOUNTER: ICD-10-CM

## 2021-05-26 DIAGNOSIS — G47.30 OBSERVED SLEEP APNEA: ICD-10-CM

## 2021-05-26 PROCEDURE — 99495 TRANSJ CARE MGMT MOD F2F 14D: CPT | Performed by: PHYSICIAN ASSISTANT

## 2021-05-26 PROCEDURE — 99214 OFFICE O/P EST MOD 30 MIN: CPT | Performed by: PHYSICIAN ASSISTANT

## 2021-05-26 RX ORDER — ESCITALOPRAM OXALATE 20 MG/1
20 TABLET ORAL NIGHTLY
Qty: 90 TABLET | Refills: 3 | Status: SHIPPED | OUTPATIENT
Start: 2021-05-26 | End: 2022-06-07

## 2021-05-26 RX ORDER — FOLIC ACID 1 MG/1
1000 TABLET ORAL DAILY
Qty: 90 TABLET | Refills: 3 | Status: SHIPPED | OUTPATIENT
Start: 2021-05-26 | End: 2022-06-07

## 2021-05-26 RX ORDER — DULAGLUTIDE 1.5 MG/.5ML
1.5 INJECTION, SOLUTION SUBCUTANEOUS WEEKLY
Qty: 4 PEN | Refills: 5 | Status: SHIPPED | OUTPATIENT
Start: 2021-05-26 | End: 2021-10-18

## 2021-05-26 RX ORDER — METFORMIN HYDROCHLORIDE 500 MG/1
1000 TABLET, EXTENDED RELEASE ORAL 2 TIMES DAILY WITH MEALS
Qty: 120 TABLET | Refills: 5 | Status: SHIPPED | OUTPATIENT
Start: 2021-05-26 | End: 2021-10-18 | Stop reason: SDUPTHER

## 2021-05-26 RX ORDER — NAPROXEN 500 MG/1
TABLET ORAL
COMMUNITY
Start: 2021-05-24 | End: 2021-08-25

## 2021-05-26 RX ORDER — OMEPRAZOLE 20 MG/1
20 CAPSULE, DELAYED RELEASE ORAL DAILY
Qty: 90 CAPSULE | Refills: 3 | Status: SHIPPED | OUTPATIENT
Start: 2021-05-26

## 2021-05-26 RX ORDER — ROSUVASTATIN CALCIUM 20 MG/1
20 TABLET, COATED ORAL NIGHTLY
Qty: 90 TABLET | Refills: 3 | Status: SHIPPED | OUTPATIENT
Start: 2021-05-26 | End: 2022-06-07

## 2021-05-26 RX ORDER — VALSARTAN AND HYDROCHLOROTHIAZIDE 160; 12.5 MG/1; MG/1
1 TABLET, FILM COATED ORAL DAILY
Qty: 30 TABLET | Refills: 5 | Status: SHIPPED | OUTPATIENT
Start: 2021-05-26 | End: 2021-10-18 | Stop reason: SDUPTHER

## 2021-05-26 RX ORDER — EMPAGLIFLOZIN 25 MG/1
25 TABLET, FILM COATED ORAL DAILY
Qty: 30 TABLET | Refills: 5 | Status: SHIPPED | OUTPATIENT
Start: 2021-05-26 | End: 2021-10-18 | Stop reason: SDUPTHER

## 2021-05-26 NOTE — PATIENT INSTRUCTIONS

## 2021-05-26 NOTE — PROGRESS NOTES
"Subjective   Ray London is a 45 y.o. male.     History of Present Illness       Ray London 45 y.o. male presents today for hosptial follow up.  he was treated 5-10 to 5-13-21 for cellulitis  .  I reviewed all of the labs and diagnostic testing and noted:    MRI foot  Has twinges of pain in toes; knows to check feet daily. Less neuropathy in feet with better glucose control.   The patient's medications were changed:;  Finished Keflex----ok probiotic -----loose stool;  Adjusted Lantus dose  Current outpatient and discharge medications have been reconciled for the patient.  Reviewed by: Claudette Lux PA-C    he does have a follow up appointment with a specialist:     Restarted Jardiance Friday  He is on in Lantus daily----diabetic nurse saw him and changed Lantus to AM.  Adjusts insulin --most recent 35 units.  Has been on 40 units.  With adding Jardiance--glucose better      He is seeing wound care for foot--great toe  Had one operation for debridement---no bone involvement  Still need to eval sleep apnea   Since the last visit, he has overall felt tired.  He has Essential Hypertension and well controlled on current medication, DMII not controlled on current regimen and will add/adjust medication, work on diet and exercise, get follow up labs, GERD controlled on PPI Rx, Hyperlipidemia with goals met with current Rx and Vitamin D deficiency and will update labs for continued management.  he has been compliant with current medications have reviewed them.  The patient denies medication side effects.  Will refill medications. /80 (BP Location: Left arm, Patient Position: Sitting, Cuff Size: Adult)   Pulse 69   Temp 97.5 °F (36.4 °C)   Resp 16   Ht 182.9 cm (72.01\")   Wt (!) 179 kg (395 lb)   SpO2 100%   BMI 53.56 kg/m²     Results for orders placed or performed during the hospital encounter of 05/10/21   Wound Culture - Wound, Foot    Specimen: Foot; Wound   Result Value Ref Range    Wound " Culture (A)      Light growth (2+) Streptococcus agalactiae (Group B)    STREP GROUPING B     Wound Culture Scant growth (1+) Normal Skin Kandace     Gram Stain Few (2+) Gram positive cocci     Gram Stain No WBCs seen    Blood Culture - Blood, Arm, Right    Specimen: Arm, Right; Blood   Result Value Ref Range    Blood Culture No growth at 5 days    Blood Culture - Blood, Wrist, Right    Specimen: Wrist, Right; Blood   Result Value Ref Range    Blood Culture No growth at 5 days    COVID-19,[facility], NP/OP SWAB IN UTM/VTM/[facility] TRANSPORT MEDIA,24 HR TAT - Swab, Nasopharynx    Specimen: Nasopharynx; Swab   Result Value Ref Range    COVID19 Not Detected Not Detected - Ref. Range   Comprehensive Metabolic Panel    Specimen: Arm, Left; Blood   Result Value Ref Range    Glucose 206 (H) 65 - 99 mg/dL    BUN 13 6 - 20 mg/dL    Creatinine 0.97 0.76 - 1.27 mg/dL    Sodium 134 (L) 136 - 145 mmol/L    Potassium 4.1 3.5 - 5.2 mmol/L    Chloride 97 (L) 98 - 107 mmol/L    CO2 25.3 22.0 - 29.0 mmol/L    Calcium 9.5 8.6 - 10.5 mg/dL    Total Protein 7.8 6.0 - 8.5 g/dL    Albumin 4.50 3.50 - 5.20 g/dL    ALT (SGPT) 23 1 - 41 U/L    AST (SGOT) 15 1 - 40 U/L    Alkaline Phosphatase 77 39 - 117 U/L    Total Bilirubin 0.9 0.0 - 1.2 mg/dL    eGFR Non African Amer 84 >60 mL/min/1.73    Globulin 3.3 gm/dL    A/G Ratio 1.4 g/dL    BUN/Creatinine Ratio 13.4 7.0 - 25.0    Anion Gap 11.7 5.0 - 15.0 mmol/L   Urinalysis With Microscopic If Indicated (No Culture) - Urine, Clean Catch    Specimen: Urine, Clean Catch   Result Value Ref Range    Color, UA Dark Yellow (A) Yellow, Straw    Appearance, UA Clear Clear    pH, UA 6.0 5.0 - 8.0    Specific Gravity, UA 1.027 1.005 - 1.030    Glucose, UA >=1000 mg/dL (3+) (A) Negative    Ketones, UA 15 mg/dL (1+) (A) Negative    Bilirubin, UA Negative Negative    Blood, UA Negative Negative    Protein, UA Trace (A) Negative    Leuk Esterase, UA Moderate (2+) (A) Negative    Nitrite, UA  Negative Negative    Urobilinogen, UA 4.0 E.U./dL (A) 0.2 - 1.0 E.U./dL   CBC Auto Differential    Specimen: Arm, Left; Blood   Result Value Ref Range    WBC 11.87 (H) 3.40 - 10.80 10*3/mm3    RBC 5.28 4.14 - 5.80 10*6/mm3    Hemoglobin 15.6 13.0 - 17.7 g/dL    Hematocrit 45.6 37.5 - 51.0 %    MCV 86.4 79.0 - 97.0 fL    MCH 29.5 26.6 - 33.0 pg    MCHC 34.2 31.5 - 35.7 g/dL    RDW 12.3 12.3 - 15.4 %    RDW-SD 38.3 37.0 - 54.0 fl    MPV 9.6 6.0 - 12.0 fL    Platelets 233 140 - 450 10*3/mm3    Neutrophil % 69.3 42.7 - 76.0 %    Lymphocyte % 19.5 (L) 19.6 - 45.3 %    Monocyte % 8.6 5.0 - 12.0 %    Eosinophil % 1.1 0.3 - 6.2 %    Basophil % 0.8 0.0 - 1.5 %    Immature Grans % 0.7 (H) 0.0 - 0.5 %    Neutrophils, Absolute 8.24 (H) 1.70 - 7.00 10*3/mm3    Lymphocytes, Absolute 2.31 0.70 - 3.10 10*3/mm3    Monocytes, Absolute 1.02 (H) 0.10 - 0.90 10*3/mm3    Eosinophils, Absolute 0.13 0.00 - 0.40 10*3/mm3    Basophils, Absolute 0.09 0.00 - 0.20 10*3/mm3    Immature Grans, Absolute 0.08 (H) 0.00 - 0.05 10*3/mm3    nRBC 0.0 0.0 - 0.2 /100 WBC   Beta Hydroxybutyrate Quantitative    Specimen: Arm, Left; Blood   Result Value Ref Range    Beta-Hydroxybutyrate Quant 0.230 0.020 - 0.270 mmol/L   Urinalysis, Microscopic Only - Urine, Clean Catch    Specimen: Urine, Clean Catch   Result Value Ref Range    RBC, UA 0-2 None Seen, 0-2 /HPF    WBC, UA 31-50 (A) None Seen, 0-2 /HPF    Bacteria, UA 2+ (A) None Seen /HPF    Squamous Epithelial Cells, UA 0-2 None Seen, 0-2 /HPF    Hyaline Casts, UA None Seen None Seen /LPF    Calcium Oxalate Crystals, UA Moderate/2+ None Seen /HPF    Methodology Manual Light Microscopy    Sedimentation Rate    Specimen: Arm, Left; Blood   Result Value Ref Range    Sed Rate 36 (H) 0 - 15 mm/hr   C-reactive Protein    Specimen: Arm, Left; Blood   Result Value Ref Range    C-Reactive Protein 14.55 (H) 0.00 - 0.50 mg/dL   Lactic Acid, Plasma    Specimen: Blood   Result Value Ref Range    Lactate 1.6 0.5 - 2.0  mmol/L   CBC (No Diff)    Specimen: Blood   Result Value Ref Range    WBC 9.36 3.40 - 10.80 10*3/mm3    RBC 4.64 4.14 - 5.80 10*6/mm3    Hemoglobin 13.6 13.0 - 17.7 g/dL    Hematocrit 40.7 37.5 - 51.0 %    MCV 87.7 79.0 - 97.0 fL    MCH 29.3 26.6 - 33.0 pg    MCHC 33.4 31.5 - 35.7 g/dL    RDW 12.5 12.3 - 15.4 %    RDW-SD 39.7 37.0 - 54.0 fl    MPV 10.0 6.0 - 12.0 fL    Platelets 178 140 - 450 10*3/mm3   Basic Metabolic Panel    Specimen: Blood   Result Value Ref Range    Glucose 185 (H) 65 - 99 mg/dL    BUN 11 6 - 20 mg/dL    Creatinine 0.93 0.76 - 1.27 mg/dL    Sodium 135 (L) 136 - 145 mmol/L    Potassium 3.4 (L) 3.5 - 5.2 mmol/L    Chloride 101 98 - 107 mmol/L    CO2 23.7 22.0 - 29.0 mmol/L    Calcium 8.4 (L) 8.6 - 10.5 mg/dL    eGFR Non African Amer 88 >60 mL/min/1.73    BUN/Creatinine Ratio 11.8 7.0 - 25.0    Anion Gap 10.3 5.0 - 15.0 mmol/L   Hemoglobin A1c    Specimen: Blood   Result Value Ref Range    Hemoglobin A1C 9.90 (H) 4.80 - 5.60 %   Procalcitonin    Specimen: Blood   Result Value Ref Range    Procalcitonin 0.20 0.00 - 0.25 ng/mL   Potassium    Specimen: Blood   Result Value Ref Range    Potassium 4.0 3.5 - 5.2 mmol/L   CBC (No Diff)    Specimen: Blood   Result Value Ref Range    WBC 8.40 3.40 - 10.80 10*3/mm3    RBC 4.25 4.14 - 5.80 10*6/mm3    Hemoglobin 12.5 (L) 13.0 - 17.7 g/dL    Hematocrit 36.5 (L) 37.5 - 51.0 %    MCV 85.9 79.0 - 97.0 fL    MCH 29.4 26.6 - 33.0 pg    MCHC 34.2 31.5 - 35.7 g/dL    RDW 11.9 (L) 12.3 - 15.4 %    RDW-SD 36.7 (L) 37.0 - 54.0 fl    MPV 9.6 6.0 - 12.0 fL    Platelets 179 140 - 450 10*3/mm3   Basic Metabolic Panel    Specimen: Blood   Result Value Ref Range    Glucose 193 (H) 65 - 99 mg/dL    BUN 8 6 - 20 mg/dL    Creatinine 1.07 0.76 - 1.27 mg/dL    Sodium 134 (L) 136 - 145 mmol/L    Potassium 4.3 3.5 - 5.2 mmol/L    Chloride 103 98 - 107 mmol/L    CO2 24.9 22.0 - 29.0 mmol/L    Calcium 8.3 (L) 8.6 - 10.5 mg/dL    eGFR Non African Amer 75 >60 mL/min/1.73     BUN/Creatinine Ratio 7.5 7.0 - 25.0    Anion Gap 6.1 5.0 - 15.0 mmol/L   Vancomycin, Trough    Specimen: Blood   Result Value Ref Range    Vancomycin Trough 9.10 5.00 - 20.00 mcg/mL   Sedimentation Rate    Specimen: Blood   Result Value Ref Range    Sed Rate 41 (H) 0 - 15 mm/hr   C-reactive Protein    Specimen: Blood   Result Value Ref Range    C-Reactive Protein 7.13 (H) 0.00 - 0.50 mg/dL   CBC (No Diff)    Specimen: Blood   Result Value Ref Range    WBC 6.61 3.40 - 10.80 10*3/mm3    RBC 4.21 4.14 - 5.80 10*6/mm3    Hemoglobin 12.3 (L) 13.0 - 17.7 g/dL    Hematocrit 36.6 (L) 37.5 - 51.0 %    MCV 86.9 79.0 - 97.0 fL    MCH 29.2 26.6 - 33.0 pg    MCHC 33.6 31.5 - 35.7 g/dL    RDW 12.0 (L) 12.3 - 15.4 %    RDW-SD 38.4 37.0 - 54.0 fl    MPV 9.4 6.0 - 12.0 fL    Platelets 185 140 - 450 10*3/mm3   Basic Metabolic Panel    Specimen: Blood   Result Value Ref Range    Glucose 155 (H) 65 - 99 mg/dL    BUN 7 6 - 20 mg/dL    Creatinine 0.99 0.76 - 1.27 mg/dL    Sodium 137 136 - 145 mmol/L    Potassium 4.3 3.5 - 5.2 mmol/L    Chloride 103 98 - 107 mmol/L    CO2 25.9 22.0 - 29.0 mmol/L    Calcium 8.6 8.6 - 10.5 mg/dL    eGFR Non African Amer 82 >60 mL/min/1.73    BUN/Creatinine Ratio 7.1 7.0 - 25.0    Anion Gap 8.1 5.0 - 15.0 mmol/L   POC Glucose Once    Specimen: Blood   Result Value Ref Range    Glucose 251 (H) 70 - 130 mg/dL   POC Glucose Once    Specimen: Blood   Result Value Ref Range    Glucose 240 (H) 70 - 130 mg/dL   POC Glucose Once    Specimen: Blood   Result Value Ref Range    Glucose 179 (H) 70 - 130 mg/dL   POC Glucose Once    Specimen: Blood   Result Value Ref Range    Glucose 219 (H) 70 - 130 mg/dL   POC Glucose Once    Specimen: Blood   Result Value Ref Range    Glucose 214 (H) 70 - 130 mg/dL   POC Glucose Once    Specimen: Blood   Result Value Ref Range    Glucose 219 (H) 70 - 130 mg/dL   POC Glucose Once    Specimen: Blood   Result Value Ref Range    Glucose 212 (H) 70 - 130 mg/dL   POC Glucose Once     Specimen: Blood   Result Value Ref Range    Glucose 164 (H) 70 - 130 mg/dL   POC Glucose Once    Specimen: Blood   Result Value Ref Range    Glucose 155 (H) 70 - 130 mg/dL   POC Glucose Once    Specimen: Blood   Result Value Ref Range    Glucose 204 (H) 70 - 130 mg/dL   POC Glucose Once    Specimen: Blood   Result Value Ref Range    Glucose 188 (H) 70 - 130 mg/dL   POC Glucose Once    Specimen: Blood   Result Value Ref Range    Glucose 163 (H) 70 - 130 mg/dL   POC Glucose Once    Specimen: Blood   Result Value Ref Range    Glucose 211 (H) 70 - 130 mg/dL   Light Blue Top   Result Value Ref Range    Extra Tube hold for add-on    Green Top (Gel)   Result Value Ref Range    Extra Tube Hold for add-ons.    Lavender Top   Result Value Ref Range    Extra Tube hold for add-on    Gold Top - SST   Result Value Ref Range    Extra Tube Hold for add-ons.          The following portions of the patient's history were reviewed and updated as appropriate: allergies, current medications, past family history, past medical history, past social history, past surgical history and problem list.    Review of Systems   Constitutional: Negative for activity change, appetite change and unexpected weight change.   HENT: Negative for nosebleeds and trouble swallowing.    Eyes: Negative for pain and visual disturbance.   Respiratory: Negative for chest tightness, shortness of breath and wheezing.    Cardiovascular: Negative for chest pain and palpitations.   Gastrointestinal: Negative for abdominal pain and blood in stool.   Endocrine: Negative.    Genitourinary: Negative for difficulty urinating and hematuria.   Musculoskeletal: Negative for joint swelling.   Skin: Positive for wound. Negative for color change and rash.   Allergic/Immunologic: Negative.    Neurological: Negative for syncope and speech difficulty.   Hematological: Negative for adenopathy.   Psychiatric/Behavioral: Negative for agitation and confusion.   All other systems  reviewed and are negative.      Objective   Physical Exam  Vitals and nursing note reviewed.   Constitutional:       General: He is not in acute distress.     Appearance: He is well-developed. He is obese. He is not diaphoretic.   HENT:      Head: Normocephalic.   Eyes:      Conjunctiva/sclera: Conjunctivae normal.      Pupils: Pupils are equal, round, and reactive to light.   Neck:      Vascular: No carotid bruit.   Cardiovascular:      Rate and Rhythm: Normal rate and regular rhythm.      Pulses: Normal pulses.      Heart sounds: Normal heart sounds. No murmur heard.     Pulmonary:      Effort: Pulmonary effort is normal.      Breath sounds: Normal breath sounds.   Musculoskeletal:         General: Normal range of motion.      Cervical back: Normal range of motion and neck supple.      Right lower leg: Edema present.      Left lower leg: Edema present.   Skin:     General: Skin is warm and dry.      Findings: Rash present.      Comments: See  Photo left great toe   Neurological:      Mental Status: He is alert and oriented to person, place, and time.   Psychiatric:         Mood and Affect: Affect is not inappropriate.         Behavior: Behavior normal.         Thought Content: Thought content normal.         Judgment: Judgment normal.         Assessment/Plan   There are no diagnoses linked to this encounter.    Labs due August and f/u      See wound care  Refer endocrine  Refer for sleep med  Sees derm for psoriasis  Plan, aRy London, was seen today.  he was seen for HTN and continue medication, DMII and adjusted/added medication, Hyperlipidemia and will continue current medication and Vitamin D deficiency and will update labs .  Cont Lexapro for anxiety--works  Labs August  Adjust Lantus --sliding scale

## 2021-06-01 ENCOUNTER — OFFICE VISIT (OUTPATIENT)
Dept: WOUND CARE | Facility: HOSPITAL | Age: 46
End: 2021-06-01

## 2021-06-01 PROCEDURE — G0463 HOSPITAL OUTPT CLINIC VISIT: HCPCS

## 2021-06-03 ENCOUNTER — READMISSION MANAGEMENT (OUTPATIENT)
Dept: CALL CENTER | Facility: HOSPITAL | Age: 46
End: 2021-06-03

## 2021-06-03 NOTE — OUTREACH NOTE
Medical Week 3 Survey      Responses   [facility] patient discharged from?  [place]   Does the patient have one of the following disease processes/diagnoses(primary or secondary)?  Other   Week 3 attempt successful?  No   Unsuccessful attempts  Attempt 1          April Serra RN

## 2021-06-14 ENCOUNTER — OFFICE VISIT (OUTPATIENT)
Dept: WOUND CARE | Facility: HOSPITAL | Age: 46
End: 2021-06-14

## 2021-06-14 PROCEDURE — G0463 HOSPITAL OUTPT CLINIC VISIT: HCPCS

## 2021-06-24 ENCOUNTER — OFFICE VISIT (OUTPATIENT)
Dept: WOUND CARE | Facility: HOSPITAL | Age: 46
End: 2021-06-24

## 2021-06-28 ENCOUNTER — OFFICE VISIT (OUTPATIENT)
Dept: WOUND CARE | Facility: HOSPITAL | Age: 46
End: 2021-06-28

## 2021-06-28 ENCOUNTER — APPOINTMENT (OUTPATIENT)
Dept: WOUND CARE | Facility: HOSPITAL | Age: 46
End: 2021-06-28

## 2021-07-02 ENCOUNTER — TELEPHONE (OUTPATIENT)
Dept: FAMILY MEDICINE CLINIC | Facility: CLINIC | Age: 46
End: 2021-07-02

## 2021-07-02 NOTE — PAYOR COMM NOTE
"P: [phone]  F: [phone]    INITIAL CLINICAL FOR REVIEW    STARTED OBSERVATION CHANGED TO INPATIENT 12/10/2020    REF #[id number]        Ray London (45 y.o. Male)     Date of Birth Social Security Number Address Home Phone MRN    1975  [id number]  [place] [id number] [phone] [id number]    Latter-day Marital Status          Roman Catholic        Admission Date Admission Type Admitting Provider Attending Provider Department, Room/Bed    20 Elective Terry Conteh MD Witten, Jonathan L, MD [address],     Discharge Date Discharge Disposition Discharge Destination                       Attending Provider: Terry Conteh MD    Allergies: No Known Allergies    Isolation: None   Infection: None   Code Status: CPR    Ht: 182.9 cm (72\")   Wt: 174 kg (384 lb 3 oz)    Admission Cmt: None   Principal Problem: None                Active Insurance as of 2020     Primary Coverage     Payor Plan Insurance Group Employer/Plan Group    [employer] [facility] [employer] [facility] PPO [id number]     Payor Plan Address Payor Plan Phone Number Payor Plan Fax Number Effective Dates    [address] [phone]  2020 - None Entered    [place] [address]       Subscriber Name Subscriber Birth Date Member ID       BOWEN LONDON 1988 [id number]                 Emergency Contacts      (Rel.) Home Phone Work Phone Mobile Phone    Bowen London (Spouse) [phone] -- [phone]               History & Physical      Terry Conteh MD at 20 0700               [facility] History & Physical      Patient Identification:  NAME:  Ray London  Age:  45 y.o.   Sex:  male   :  1975   MRN:  [id number]       Chief complaint: Planned Procedure    History of present illness:  Ray London is a 45 year old man with history of acquired buried penis.  No changes since last seen in " clinic.      Past medical history:  Past Medical History:   Diagnosis Date   • Anxiety    • Depression    • Diabetes mellitus (CMS/HCC)    • ED (erectile dysfunction)    • Erectile dysfunction    • Folic acid deficiency    • GERD (gastroesophageal reflux disease)    • History of cellulitis     RT LEG KNEE TO FOOT   • Hyperlipidemia    • Hypertension    • Obesity    • Psoriasis    • Sleep apnea     NO CPAP USE   • Visual impairment        Past surgical history:  Past Surgical History:   Procedure Laterality Date   • ADENOIDECTOMY     • TONSILLECTOMY         Allergies:  Patient has no known allergies.    Home medications:  Medications Prior to Admission   Medication Sig Dispense Refill Last Dose   • amLODIPine (NORVASC) 5 MG tablet Take 1 tablet by mouth Daily. For BP (Patient taking differently: Take 5 mg by mouth Every Night. For BP) 30 tablet 5 12/7/2020 at 2330   • clobetasol (TEMOVATE) 0.05 % ointment Apply 1 application topically to the appropriate area as directed As Needed.   Past Month at Unknown time   • [facility] D3 50 MCG (2000 UT) capsule TAKE ONE CAPSULE BY MOUTH EVERY DAY (Patient taking differently: Take 2,000 Units by mouth Every Night.) 90 capsule 3 Past Month at Unknown time   • Empagliflozin (Jardiance) 25 MG tablet Take 25 mg by mouth Daily. For DMII (Patient taking differently: Take 25 mg by mouth Every Night. For DMII) 30 tablet 5 12/7/2020 at 2330   • escitalopram (LEXAPRO) 20 MG tablet ONE TAB DAILY FOR STRESS (Patient taking differently: Take 20 mg by mouth Every Night. ONE TAB DAILY FOR STRESS) 90 tablet 3 12/7/2020 at 2330   • folic acid (FOLVITE) 1 MG tablet Take 1 tablet by mouth Daily. 90 tablet 3 12/7/2020 at 2330   • metFORMIN (Glucophage) 500 MG tablet Take 2 tablets by mouth 2 (Two) Times a Day With Meals. For DMII 360 tablet 1 12/7/2020 at 2330   • omeprazole (priLOSEC) 20 MG capsule TAKE 1 CAPSULE BY MOUTH DAILY. FOR GERD (Patient taking differently: Take 20 mg by mouth Every Night. For  GERD) 90 capsule 3 2020 at 2330   • rosuvastatin (Crestor) 20 MG tablet Take 1 tablet by mouth Daily. For cholesterol--new dose (Patient taking differently: Take 20 mg by mouth Every Night. For cholesterol--new dose) 30 tablet 11 2020 at 2330   • valsartan-hydrochlorothiazide (DIOVAN-HCT) 160-12.5 MG per tablet Take 1 tablet by mouth Daily. for blood pressure. (Patient taking differently: Take 1 tablet by mouth Every Night. for blood pressure.) 30 tablet 5 2020 at 2330   • Dulaglutide (Trulicity) 1.5 MG/0.5ML solution pen-injector Inject 1.5 mg under the skin into the appropriate area as directed 1 (One) Time Per Week. Inject 1.5mg SC once weekly for DMII (Patient taking differently: Inject 1.5 mg under the skin into the appropriate area as directed 1 (One) Time Per Week. Inject 1.5mg SC once weekly for DMII ON WEDNESDAY) 4 pen 5 2020   • STELARA 90 MG/ML solution prefilled syringe Injection Inject  under the skin into the appropriate area as directed Every 3 (Three) Months. DUE IN JANUARY   10/2020        Hospital medications:  ceFAZolin, 3 g, Intravenous, Once  sodium chloride, 3 mL, Intravenous, Q12H      lactated ringers, 9 mL/hr, Last Rate: 9 mL/hr (20 0559)      fentanyl  •  lidocaine PF 1%  •  midazolam  •  sodium chloride    Family history:  Family History   Problem Relation Age of Onset   • Hypertension Mother    • Anxiety disorder Mother    • Diabetes Maternal Grandmother    • Cancer Maternal Grandmother    • Alcohol abuse Father    • Cancer Maternal Aunt    • Alcohol abuse Paternal Grandfather    • Alcohol abuse Maternal Uncle    • Malig Hyperthermia Neg Hx        Social history:  Social History     Tobacco Use   • Smoking status: Former Smoker     Packs/day: 0.50     Years: 12.00     Pack years: 6.00     Types: Cigarettes     Start date:      Quit date: 10/11/2011     Years since quittin.1   • Smokeless tobacco: Never Used   Substance Use Topics   • Alcohol use: No   •  Drug use: No       REVIEW OF SYSTEMS:  Constitutional - Negative for fevers/chills  Eyes/Ears/Nose/Mouth/Throat - Negative for changes in vision  Cardiovascular - Negative for chest pain, dysrhythmia  Respiratory - Negative for dyspnea  Gastrointestinal - Negative for nausea or vomiting  Genitourinary - Negative for dysuria  Hematologic/Lymphatic - Negative for bruising  Skin - Negative for erythema  Endocrine - history of diabetes    Objective:  TMax 24 hours:   Temp (24hrs), Av.4 °F (36.9 °C), Min:98.4 °F (36.9 °C), Max:98.4 °F (36.9 °C)      Vitals Ranges:   Temp:  [98.4 °F (36.9 °C)] 98.4 °F (36.9 °C)  Heart Rate:  [86-88] 86  Resp:  [18-20] 18  BP: (150)/(92) 150/92    Intake/Output Last 3 shifts:  No intake/output data recorded.     Physical Exam:    General Appearance:    Alert, cooperative, NAD   HEENT:    No trauma, pupils reactive, hearing intact   Back:     No CVA tenderness   Lungs:     Respirations unlabored, no wheezing    Heart:    RRR, intact peripheral pulses   Abdomen:     Soft, NDNT, no masses, no guarding   :    Testes descended bilaterally, no nodules.  Penis normal.      No scrotal or penile rashes noted   Extremities:   No edema, no deformity   Lymphatic:   No neck or groin LAD   Skin:   No bleeding, bruising or rashes   Neuro/Psych:   Orientation intact, mood/affect pleasant, no focal findings       Results review:   I reviewed the patient's new clinical results.    Data review:  Lab Results (last 24 hours)     Procedure Component Value Units Date/Time    POC Glucose Once [[id number]]  (Abnormal) Collected: 20    Specimen: Blood Updated: 20     Glucose 180 mg/dL            Imaging:  Imaging Results (Last 24 Hours)     ** No results found for the last 24 hours. **             Assessment:       * No active hospital problems. *    Acquired buried penis    We discussed the benefits/risks/expectations and he desires surgical intervention. Risks include bleeding,  infection, urinary tract infection/sepsis, damage to adjacent tissues including the penis/testicles/scrotum, chronic pain, numbness, erectile dysfunction, decreased sensation, loss of skin, additional procedures, stroke, heart attack, death.    Plan:     Proceed with Buried penis repair    Terry Conteh MD  12/08/20  07:00 EST    Electronically signed by Terry Conteh MD at 12/08/20 0701           {Outbreak/Travel/Exposure Documentation......;  Question Available Choices Patient Response   Outbreak Screen: Do you currently have a new onset of the following symptoms?        Fever/Chils, Cough, Shortness of air, Loss of taste or smell, No, Unknown  No (12/08/20 0557)   Outbreak Screen: In the last 14 days, have you had contact with anyone who is ill, has show any of the symptoms listed above and/or has been diagnosis with the 2019 Novel Coronavirus? This includes any immediate household members but excludes any patients with whom you have been in contact within your normal work duties wearing proper PPE, if you are a healthcare worker.  Yes, No, Unknown              No (12/08/20 0557)   Outbreak Screen: Who was notified?    Free text  (not recorded)   Travel Screen: Have you traveled in the last month? If so, to what country have you traveled? If [place] what state? Yes, No, Unknown  List of all countries  List of all States No (12/08/20 0557)  (not recorded)  (not recorded)   Infection Risk: Do you currently have the following symptoms?  (If cough is selected, the Tuberculosis Screen is performed.) Cough, Fever, Rash, No No (12/08/20 0557)   Tuberculosis Screen: Do you have any of the following Tuberculosis Risks?  · Have you lived or spent time with anyone who had or may have TB?  · Have you lived in or visited any of the following areas for more than one month: [place], [place], [place], [place] or [place], [place] or [place]?  · Do you have HIV/AIDS?  · Have you lived in or worked in a nursing  home, homeless shelter, correctional facility, or substance abuse treatment facility?   · No    If Yes do you have any of the following symptoms? Yes responses display to the right    If Yes, symptoms listed are:  Cough greater than or equal to 3 weeks, Loss of appetite, Unexplained weight loss, Night sweats, Bloody sputum or hemoptysis, Hoarseness, Fever, Fatigue, Chest pain, No (not recorded)  (not recorded)   Exposure Screen: Have you been exposed to any of these contagious diseases in the last month? Measles, Chickenpox, Meningitis, Pertussis, Whooping Cough, No No (12/08/20 0557)       Vital Signs (last day)     Date/Time   Temp   Temp src   Pulse   Resp   BP   Patient Position   SpO2    12/11/20 0811   97.8 (36.6)   Oral   76   18   144/95   Lying   96    12/11/20 0326   100 (37.8)   Oral   72   18   125/74   Lying   95    12/10/20 1930   98.9 (37.2)   Oral   83   18   112/70   Lying   95    12/10/20 1513   98 (36.7)   Oral   77   16   120/72   Lying   95    12/10/20 1138   97.5 (36.4)   Oral   68   16   120/83   Lying   96    12/10/20 0733   97.3 (36.3)   Oral   75   18   126/81   Lying   98    12/10/20 0326   98 (36.7)   Oral   75   18   135/82   Lying   97    12/10/20 0113   --   --   --   --   --   --   95    12/10/20 0109   --   --   --   --   --   --   90    12/10/20 0106   --   --   --   --   --   --   (!) 85              Oxygen Therapy (last day)     Date/Time   SpO2   Device (Oxygen Therapy)   Flow (L/min)   Oxygen Concentration (%)   ETCO2 (mmHg)    12/11/20 0811   96   room air   --   --   --    12/11/20 0326   95   nasal cannula   2   --   --    12/10/20 2114   --   nasal cannula   2   --   --    12/10/20 1930   95   nasal cannula   2   --   --    12/10/20 1513   95   nasal cannula   2   --   --    12/10/20 1138   96   nasal cannula   2   --   --    12/10/20 0733   98   nasal cannula   2   --   --    12/10/20 0326   97   nasal cannula   2   --   --    12/10/20 0113   95   nasal cannula   2   --    --    12/10/20 0109   90   room air   --   --   --    12/10/20 0106   (!) 85   room air   --   --   --              Lines, Drains & Airways    Active LDAs     Name:   Placement date:   Placement time:   Site:   Days:    Peripheral IV 12/09/20 Left Forearm   12/09/20    --    Forearm   2    Urethral Catheter   12/08/20 0827     3                  Current Facility-Administered Medications   Medication Dose Route Frequency Provider Last Rate Last Admin   • acetaminophen (TYLENOL) tablet 650 mg  650 mg Oral Q4H PRN Terry Conteh MD        Or   • acetaminophen (TYLENOL) suppository 650 mg  650 mg Rectal Q4H PRN Terry Conteh MD       • amLODIPine (NORVASC) tablet 5 mg  5 mg Oral Nightly Terry Conteh MD   5 mg at 12/10/20 2114   • diphenhydrAMINE (BENADRYL) capsule 25 mg  25 mg Oral Q6H PRN Terry Conteh MD   25 mg at 12/10/20 2115   • enoxaparin (LOVENOX) syringe 40 mg  40 mg Subcutaneous Daily Terry Conteh MD   40 mg at 12/10/20 0841   • erythromycin (ROMYCIN) ophthalmic ointment   Both Eyes Q12H Leydi Danielson MD   Given at 12/10/20 2114   • escitalopram (LEXAPRO) tablet 20 mg  20 mg Oral Nightly Terry Conteh MD   20 mg at 12/10/20 2114   • folic acid (FOLVITE) tablet 1,000 mcg  1,000 mcg Oral Daily Terry Conteh MD   1,000 mcg at 12/10/20 2114   • Glycerin-Hypromellose- (ARTIFICIAL TEARS) 0.2-0.2-1 % ophthalmic solution solution 1 drop  1 drop Both Eyes Q1H PRN Terry Conteh MD   1 drop at 12/08/20 1847   • hydroCHLOROthiazide (MICROZIDE) capsule 12.5 mg  12.5 mg Oral Daily Asher Block MD   12.5 mg at 12/10/20 0831   • HYDROmorphone (DILAUDID) injection 0.5 mg  0.5 mg Intravenous Q2H PRN Terry Conteh MD   0.5 mg at 12/11/20 0523    And   • naloxone (NARCAN) injection 0.1 mg  0.1 mg Intravenous Q5 Min PRN Terry Conthe MD       • insulin glargine (LANTUS) injection 5 Units  5 Units Subcutaneous Asher Erickson MD    5 Units at 12/10/20 0841   • insulin lispro (humaLOG) injection 2 Units  2 Units Subcutaneous TID With Meals Asher Block MD   2 Units at 12/10/20 1741   • ketorolac (ACULAR) 0.5 % ophthalmic solution 1 drop  1 drop Both Eyes TID PRN Fei Kunz MD   1 drop at 12/09/20 1547   • lactated ringers infusion  9 mL/hr Intravenous Continuous Kartik Galeano MD   Stopped at 12/08/20 1541   • metFORMIN (GLUCOPHAGE) tablet 1,000 mg  1,000 mg Oral BID With Meals Terry Conteh MD   1,000 mg at 12/10/20 2114   • ondansetron (ZOFRAN) tablet 4 mg  4 mg Oral Q6H PRN Terry Conteh MD        Or   • ondansetron (ZOFRAN) injection 4 mg  4 mg Intravenous Q6H PRN Terry Conteh MD       • oxyCODONE (ROXICODONE) immediate release tablet 5 mg  5 mg Oral Q4H PRN Terry Conteh MD   5 mg at 12/11/20 0650   • pantoprazole (PROTONIX) EC tablet 40 mg  40 mg Oral Q AM Asher Block MD   40 mg at 12/11/20 0520   • rosuvastatin (CRESTOR) tablet 20 mg  20 mg Oral Nightly Terry Conteh MD   20 mg at 12/10/20 2114   • sennosides-docusate (PERICOLACE) 8.6-50 MG per tablet 2 tablet  2 tablet Oral Nightly Terry Conteh MD   2 tablet at 12/10/20 2114   • sodium chloride 0.9 % flush 10 mL  10 mL Intravenous PRN Terry Conteh MD       • sodium chloride 0.9 % flush 3 mL  3 mL Intravenous Q12H Terry Conteh MD   3 mL at 12/10/20 2122   • tobramycin-dexamethasone (TOBRADEX) 0.3-0.1 % ophthalmic suspension 1 drop  1 drop Right Eye Q2H While Awake Fei Kunz MD   1 drop at 12/11/20 0520   • valsartan (DIOVAN) tablet 160 mg  160 mg Oral Q24H Asher Block MD   160 mg at 12/10/20 0831       Orders (active)      Start     Ordered    12/09/20 1600  tobramycin-dexamethasone (TOBRADEX) 0.3-0.1 % ophthalmic suspension 1 drop  Every 2 Hours While Awake      12/09/20 1511    12/09/20 1510  ketorolac (ACULAR) 0.5 % ophthalmic solution 1 drop  3 Times Daily PRN       12/09/20 1511    12/09/20 1100  POC Glucose Finger 4x Daily AC & at Bedtime  4 Times Daily Before Meals & at Bedtime      12/09/20 0721    12/09/20 1006  Inpatient Ophthalmology Consult  Once     Specialty:  Ophthalmology  Provider:  Fei Kunz MD    12/09/20 1005    12/09/20 0900  enoxaparin (LOVENOX) syringe 40 mg  Daily      12/08/20 1338    12/09/20 0900  hydroCHLOROthiazide (MICROZIDE) capsule 12.5 mg  Daily      12/09/20 0600    12/09/20 0900  valsartan (DIOVAN) tablet 160 mg  Every 24 Hours Scheduled      12/09/20 0600    12/09/20 0800  insulin lispro (humaLOG) injection 2 Units  3 Times Daily With Meals      12/09/20 0600    12/09/20 0700  pantoprazole (PROTONIX) EC tablet 40 mg  Every Early Morning      12/09/20 0600    12/09/20 0700  insulin glargine (LANTUS) injection 5 Units  Every Morning      12/09/20 0601    12/09/20 0000  Instruct Patient Not to Rub Eye      12/09/20 1005    12/09/20 0000  Instruct Patient - No Eye Patch is Necessary      12/09/20 1005    12/08/20 2100  amLODIPine (NORVASC) tablet 5 mg  Nightly      12/08/20 1338    12/08/20 2100  escitalopram (LEXAPRO) tablet 20 mg  Nightly      12/08/20 1338    12/08/20 2100  metFORMIN (GLUCOPHAGE) tablet 1,000 mg  2 Times Daily With Meals      12/08/20 1338    12/08/20 2100  rosuvastatin (CRESTOR) tablet 20 mg  Nightly      12/08/20 1338    12/08/20 2100  sennosides-docusate (PERICOLACE) 8.6-50 MG per tablet 2 tablet  Nightly      12/08/20 1338    12/08/20 2100  erythromycin (ROMYCIN) ophthalmic ointment  Every 12 Hours      12/08/20 2005 12/08/20 2007  Assess Need for Indwelling Urinary Catheter - Follow Removal Protocol  Continuous     Comments: Indwelling Urinary Catheter Removal Criteria  Discontinue Indwelling Urinary Catheter Unless One of the Following is Present:  Urinary Retention or Obstruction  Chronic Urinary Catheter Use  End of Life  Critical Illness with Strict I/O   Tract or Abdominal Surgery  Stage 3/4  Sacral / Perineal Wound  Required Activity Restriction: Trauma  Required Activity Restriction: Spine Surgery  If Patient is Being Followed by Urology Contact Them PRIOR to Removal  Do Not Remove Indwelling Urinary Catheter Order is Present with a CLINICAL REASON to Maintain the Catheter. Provider is Required to Include a Clinical Reason to Maintain a Urinary Catheter    Patient Admitted With Indwelling Urinary Catheter (Not Placed at [facility])  Assess for Continued Need & Document Medical Necessity  If Infection is Suspected, Contact the Provider        12/08/20 2006 12/08/20 2007  Urinary Catheter Care  Every Shift      12/08/20 2006 12/08/20 1900  ceFAZolin in Sodium Chloride (ANCEF) IVPB solution 3 g  Every 8 Hours      12/08/20 1338    12/08/20 1500  folic acid (FOLVITE) tablet 1,000 mcg  Daily      12/08/20 1338    12/08/20 1430  sodium chloride 0.9 % flush 3 mL  Every 12 Hours Scheduled      12/08/20 1338    12/08/20 1424  Glycerin-Hypromellose- (ARTIFICIAL TEARS) 0.2-0.2-1 % ophthalmic solution solution 1 drop  Every 1 Hour PRN      12/08/20 1425    12/08/20 1400  Strict Intake and Output  Every Hour      12/08/20 1338    12/08/20 1338  Code Status and Medical Interventions:  Continuous      12/08/20 1338    12/08/20 1338  Advance Diet as Tolerated  Until Discontinued      12/08/20 1338    12/08/20 1338  Maintain Sequential Compression Device  Continuous      12/08/20 1338    12/08/20 1338  Vital Signs  Per Hospital Policy      12/08/20 1338    12/08/20 1338  Activity - Strict Bed Rest  Until Discontinued     Comments: Okay to log-roll from side to side, minimal movement of knees/legs    12/08/20 1338    12/08/20 1338  Diet Regular; Consistent Carbohydrate  Diet Effective Now      12/08/20 1338    12/08/20 1338  Insert Peripheral IV  Once      12/08/20 1338    12/08/20 1338  Saline Lock & Maintain IV Access  Continuous      12/08/20 1338    12/08/20 1338  Wound Vac  Weekly     Comments:  Dr. Conteh placed Wound vac in OR. Please do not remove without talking to Dr. Terry Conteh    12/08/20 1338    12/08/20 1337  sodium chloride 0.9 % flush 10 mL  As Needed      12/08/20 1338    12/08/20 1337  acetaminophen (TYLENOL) tablet 650 mg  Every 4 Hours PRN      12/08/20 1338    12/08/20 1337  acetaminophen (TYLENOL) suppository 650 mg  Every 4 Hours PRN      12/08/20 1338    12/08/20 1337  oxyCODONE (ROXICODONE) immediate release tablet 5 mg  Every 4 Hours PRN      12/08/20 1338    12/08/20 1337  HYDROmorphone (DILAUDID) injection 0.5 mg  Every 2 Hours PRN      12/08/20 1338    12/08/20 1337  naloxone (NARCAN) injection 0.1 mg  Every 5 Minutes PRN      12/08/20 1338    12/08/20 1337  ondansetron (ZOFRAN) tablet 4 mg  Every 6 Hours PRN      12/08/20 1338    12/08/20 1337  ondansetron (ZOFRAN) injection 4 mg  Every 6 Hours PRN      12/08/20 1338    12/08/20 1337  diphenhydrAMINE (BENADRYL) capsule 25 mg  Every 6 Hours PRN      12/08/20 1338    12/08/20 1158  Pulse Oximetry, Continuous  Continuous      12/08/20 1157    12/08/20 0615  lactated ringers infusion  Continuous      12/08/20 0613    12/08/20 0000  Instruct Patient Not to Rub Eye      12/08/20 1909    12/08/20 0000  Instruct Patient - No Eye Patch is Necessary      12/08/20 1909    12/08/20 0000  Discharge Follow-up with Specialty:      12/08/20 1909    Unscheduled  Oxygen Therapy- Nasal Cannula; Titrate for SPO2: per policy  Continuous PRN      12/08/20 1157    Unscheduled  Irrigate Indwelling Urinary Catheter  As Needed     Comments: As needed for any clogging or to check patency.    12/08/20 1338    Unscheduled  Wound Care  As Needed      12/08/20 1338    Unscheduled  Wound Care  As Needed      12/08/20 1338                   Operative/Procedure Notes      Terry Conteh MD at 12/08/20 0701  Version 1 of 1       OPERATIVE NOTE:     Date:  12/8/2020    Attending Physician: Dr. Terry Conteh MD  Assistant(s): None      Prior to the  beginning of the procedure the team paused to verify the patient's identity, as well as the procedure to be performed and the correct side/site. All equipment required was ready and available. The patient was positioned appropriately.      Preoperative Diagnosis:   1. Acquired Buried Penis  2. Lichen Sclerosus  3. Morbid obesity     Postoperative Diagnosis: Same     Procedures Performed:   1. Release of Buried Penis  2. Cystoscopy  3. Scrotoplasty  4. Split Thickness Skin graft, 81 cm^2  5. Application of wound vac     Anesthesia: General with ET tube     Indication: Ray London is a 45 year old man, morbidly obese, who presented in clinic with a buried penis and skin changes consistent with lichen sclerosus.  He also has a history of problematic Psoriasis. This greatly affects his quality of life, ability to protrude his penis or retract the foreskin, and thus his ability to practice good hygiene, and ability to achieve erection satisfactory of sexual activity. He desires repair.  He understands the risks/benefits/expectations associated with the procedure, including bleeding, infection, damage to adjacent tissues, graft failure, recurrence of lichen sclerosus, and that he may require future procedures to obtain the outcome he desires. We also discussed that he would need to remain as an inpatient with strict bed rest for 5 days while the skin graft is healing and minimize walking for one month after his procedure to promote wound healing.      Findings:   - Severe Lichen Sclerosus affecting penile skin, scrotal skin, and inferior mons pubis  - Glans skin healthy  - Urethra widely patent, some proximal bulbar narrowing, accommodated 16 Fr scope easily  - Bladder lumen normal, ureteral orifices in orthotopic position  - Left anterior thigh skin graft harvest site  - Wound vac on 75 mmHg negative pressure     Procedure in detail:  The patient was consented for surgery and taken to the operative theater. SCD's  were placed on the patient before the induction of anesthesia. The patient was placed in a supine position and underwent general anesthesia with endotracheal tube. He was prepped and draped in a sterile fashion in the supine position, including the left thigh for access as the donor skin graft site. He did not have a preference for which thigh was used. A surgical pause was initiated which identified appropriate patient and procedure and perioperative antibiotics (IV Ancef) were given.      He had a severely scarred and phimotic foreskin that was entirely covering his penis. We were unable to place a Marcano catheter since we could not visualize the urethral meatus. The phimotic foreskin was grasped and a dorsal slit was performed to the point that the glans could be seen, and a 3-0 Prolene was placed as a glans stitch. Using this to place the penis on stretch, we noted that the proximal shaft skin had been affected heavily by scarring / Lichen Sclerosus and was part of the skin opened by the dorsal slit.  We made a circumferential incision 5mm proximal to the corona of the glans and excised the shaft skin so as not to leave it with lymphedema or lack of blood supply. The shaft skin was degloved past the penoscrotal junction, leaving the dartos fascia attached to the penis as a healthy graft bed for grafting later.  Hemostasis was obtained with Bipolar Electrocautery and care was taken to protect the neurovascular bundle.       The 16 Fr flexible cystoscope was advanced down the urethra and there were no signs of lichen sclerosus but there was narrowing at the proximal bulbar urethra. The bladder wall was smooth and the ureteral orifices were orthotopic in position. There were no stones or lesions. The scope was withdrawn and a 16 Fr urethral Marcano catheter was placed, drained, and capped.     Due to the patients morbid obesity and distribution of suprapubic adipose tissue, we marked the mons pubis anterior to the  penis for reduction to facilitate exposure of the base of the penis. The skin was resected and electrocautery was used for hemostasis. The Colles' fascia overlying the midline pubic symphysis was incised with cautery and a series of 4 separate interrupted 3-0 PDS Sutures were placed in the tough overlying fascia over the penis and then fixed to the surrounding mons/scrotal skin. Once satisfied with the positioning, these were tied, bringing the mons skin down to the level of the base of the penis. Next, the lateral skin edges and ventral skin edge of the scrotum, were tacked down to the base of the penis using interrupted 3-0 Vicryl. The penis was measured in circumference, 9cm, and length, 9cm, for skin graft harvest.       Attention was turned to the left thigh where the skin graft would be harvested. The skin was marked for a total of 81cm^2. Using the [employer] Dermatome set at thickness = 18 thousandths of an inch, we harvested a 9cm by 9cm split thickness skin graft. A lidocaine/epinephrine-soaked Telfa was placed over the donor site until the end of the procedure. The skin graft was cleansed with saline and then spread over a towel and and basin. The graft was fenestrated using a #15 scalpel to allow for drainage after quilting into place.     The graft was secured to the base of the penis and the surrounding skin with interrupted 3-0 Polysorb suture, careful not to disrupt the neurovascular bundle or the urethra. The penis was cleansed with saline and dried, then we sprayed tisseal two-component fibrin sealant on the shaft of the penis penis as well as the underside of the graft before placing it over the dorsal side of the penis. After about 60 seconds of stabilization, we placed interrupted 5-0 Polysorb tacking sutures along the corona and the ventral aspect of the penis, and at the margin of preputial skin proximal to the glans. The excess graft material over the glans was excised to conform to the edge of  the preputial collar.  The graft was then quilted with addition 5-0 Polysorb to facilitate good apposition of the graft and the underlying dartos layer. This completed the skin graft.     We proceeded to make our dressing for both the grafted penis as well as the donor site. The Telfa was lifted off the left thigh donor site and it was inspected for good hemostasis. The area was dried and Mastisol was applied to the skin surrounding the donor site to facilitate food adhesion of the dressing. A large tegaderm was placed over the donor site. We then dried the skin surrounding the penis and applied Mastisol to assisted in adhesion of the wound vac dressing. Small strips of adhesive plastic were placed circumferentially on the skin surrounding the base of the penis. The penis was coated in a film of bacitracin ointment followed by two layers of Adaptic non-adhering gauze. Next, rings of black wound-vac sponge were placed from the base of the penis to the corona and secured in place with staples from sponge to sponge. Black sponge was placed over the glans, as well. Two tongue depressors were placed on the lateral sides of the penis to serve as a scaffold for the sponge during negative pressure, preventing inward compression in the longitudinal axis of the penis. The glans stitch was removed. The penis was then covered entirely with adhesive plastic and the track-pad was placed dorsally. The vacuum was applied at negative 75 mmHg and there were no leaks. The urethral Marcano catheter was connected to gravity drainage.   .  The patient was awakened, extubated, and transferred to the post-anesthesia care unit in good condition.      Estimated Blood Loss: 100 mL     Fluids: See anesthesia record     Specimens: Penile shaft skin     Complications: None     Drains:   1. 16 Fr urethral Marcano catheter  2. Wound vac to continuous suction     Plan:   - Admit to Hospital, Urology service  - Plan inpatient stay of 5-6 days  -  Bedrest, SCDs  - Diab diet   - Wound vac on continuous 75 mmhg negative pressure  - Hospitalist consult for co-management of Diabetes and HTN  - Call Dr. Terry Conteh for any Wound vac issues     I was present and scrubbed for the entire procedure.     Terry Conteh MD  [facility] Urology  General & Reconstructive Urology  Office: [phone]  Fax: [phone]      Electronically signed by Terry Conteh MD at 20 1225          Physician Progress Notes       Terry Conteh MD at 20 0651            [facility] UROLOGY DAILY PROGRESS NOTE    Patient Identification  Name: Ray London  Age: 45 y.o.  Sex: male  :  1975  MRN: [id number]    Date: 2020             Subjective:  Interval History:   Doing well  Pain controlled    Objective:    Scheduled Meds:amLODIPine, 5 mg, Oral, Nightly  enoxaparin, 40 mg, Subcutaneous, Daily  erythromycin, , Both Eyes, Q12H  escitalopram, 20 mg, Oral, Nightly  folic acid, 1,000 mcg, Oral, Daily  hydroCHLOROthiazide Oral, 12.5 mg, Oral, Daily  insulin glargine, 5 Units, Subcutaneous, QAM  insulin lispro, 2 Units, Subcutaneous, TID With Meals  metFORMIN, 1,000 mg, Oral, BID With Meals  pantoprazole, 40 mg, Oral, Q AM  rosuvastatin, 20 mg, Oral, Nightly  sennosides-docusate, 2 tablet, Oral, Nightly  sodium chloride, 3 mL, Intravenous, Q12H  tobramycin-dexamethasone, 1 drop, Right Eye, Q2H While Awake  valsartan, 160 mg, Oral, Q24H      Continuous Infusions:lactated ringers, 9 mL/hr, Last Rate: Stopped (20 1541)      PRN Meds:•  acetaminophen **OR** acetaminophen  •  diphenhydrAMINE  •  Glycerin-Hypromellose-  •  HYDROmorphone **AND** naloxone  •  ketorolac  •  ondansetron **OR** ondansetron  •  oxyCODONE  •  sodium chloride    Vital signs in last 24 hours:  Temp:  [97.3 °F (36.3 °C)-100 °F (37.8 °C)] 100 °F (37.8 °C)  Heart Rate:  [68-83] 72  Resp:  [16-18] 18  BP: (112-126)/(70-83) 125/74    Intake/Output:    Intake/Output Summary (Last 24  "hours) at 12/11/2020 0651  Last data filed at 12/11/2020 0326  Gross per 24 hour   Intake 240 ml   Output 3675 ml   Net -3435 ml       Exam:  /74 (BP Location: Right arm, Patient Position: Lying)   Pulse 72   Temp 100 °F (37.8 °C) (Oral)   Resp 18   Ht 182.9 cm (72\")   Wt (!) 174 kg (384 lb 3 oz)   SpO2 95%   BMI 52.11 kg/m²     General Appearance:    Alert, cooperative, no distress, appears stated age   Back:     Symmetric, no CVA tenderness   Lungs:     Clear to auscultation bilaterally, respirations unlabored   Heart:    Regular rate and rhythm, strong peripheral pulses   Abdomen:    Obese, Soft   Genitalia:   Wound vac in place, holding suction   Extremities:   Extremities normal, atraumatic, no cyanosis or edema   Skin:   Skin color, texture, turgor normal, no rashes or lesions        Data Review:  All labs (24hrs):   Recent Results (from the past 24 hour(s))   POC Glucose Once    Collection Time: 12/10/20  7:36 AM    Specimen: Blood   Result Value Ref Range    Glucose 140 (H) 70 - 130 mg/dL   POC Glucose Once    Collection Time: 12/10/20 11:37 AM    Specimen: Blood   Result Value Ref Range    Glucose 148 (H) 70 - 130 mg/dL   POC Glucose Once    Collection Time: 12/10/20  4:19 PM    Specimen: Blood   Result Value Ref Range    Glucose 151 (H) 70 - 130 mg/dL   POC Glucose Once    Collection Time: 12/10/20  8:30 PM    Specimen: Blood   Result Value Ref Range    Glucose 182 (H) 70 - 130 mg/dL        Assessment:    Acquired buried penis    Morbid obesity (CMS/HCC)    Type 2 diabetes mellitus with hyperglycemia (CMS/HCC)    Essential hypertension, benign    Buried penis  S/p Buried penis repair with split thickness skin graft on 12/8/2020    Plan:    - Wound vac removal tonight by Dr. Terry Conteh  - plan for dressing changes/teaching Saturday-Sunday  - Anticipate Discharge Sunday    Terry Conteh MD  12/11/2020  06:51 EST    Electronically signed by Terry Conteh MD at 12/11/20 0652   "   Asher Page MD at 12/10/20 1628              [facility]     LOS: 0 days     Subjective:    CC:No chief complaint on file.    DIET:  Diet Order   Procedures   • Diet Regular; Consistent Carbohydrate   Severe right eye pain which is now improving  No soa  No cp  Patient's wound pain is reasonably controlled      Objective:    Vital Signs:  Temp:  [97.3 °F (36.3 °C)-99.2 °F (37.3 °C)] 98 °F (36.7 °C)  Heart Rate:  [68-80] 77  Resp:  [16-18] 16  BP: (120-135)/(72-83) 120/72    SpO2:  [85 %-98 %] 95 %  on  Flow (L/min):  [2] 2;   Device (Oxygen Therapy): nasal cannula  Body mass index is 52.11 kg/m².    Physical Exam  Constitutional:       Appearance: He is well-developed.   HENT:      Head: Normocephalic and atraumatic.   Eyes:      General:         Right eye: No discharge.         Left eye: No discharge.      Conjunctiva/sclera: Conjunctivae normal.      Pupils: Pupils are equal, round, and reactive to light.   Cardiovascular:      Rate and Rhythm: Normal rate and regular rhythm.      Heart sounds: No murmur. No friction rub.   Pulmonary:      Effort: Pulmonary effort is normal.      Breath sounds: Normal breath sounds.   Abdominal:      General: Bowel sounds are normal. There is no distension.      Palpations: Abdomen is soft.      Tenderness: There is no abdominal tenderness.   Skin:     General: Skin is warm and dry.   Neurological:      General: No focal deficit present.      Mental Status: He is alert.   Psychiatric:         Mood and Affect: Mood normal.         Behavior: Behavior normal.         Results Review:    Glucose   Date Value Ref Range Status   12/09/2020 174 (H) 65 - 99 mg/dL Final     Results from last 7 days   Lab Units 12/09/20  0802   WBC 10*3/mm3 11.87*   HEMOGLOBIN g/dL 15.0   HEMATOCRIT % 45.0   PLATELETS 10*3/mm3 205     Results from last 7 days   Lab Units 12/09/20  0802   SODIUM mmol/L 133*   POTASSIUM mmol/L 4.1   CHLORIDE mmol/L 98   CO2 mmol/L 24.9   BUN  mg/dL 16   CREATININE mg/dL 1.02   CALCIUM mg/dL 8.4*   GLUCOSE mg/dL 174*         Results from last 7 days   Lab Units 12/09/20  0802   MAGNESIUM mg/dL 2.1         Cultures:  No results found for: BLOODCX, URINECX, WOUNDCX, MRSACX, RESPCX, STOOLCX    I have reviewed daily medications and changes in CPOE    Scheduled meds  amLODIPine, 5 mg, Oral, Nightly  enoxaparin, 40 mg, Subcutaneous, Daily  erythromycin, , Both Eyes, Q12H  escitalopram, 20 mg, Oral, Nightly  folic acid, 1,000 mcg, Oral, Daily  hydroCHLOROthiazide Oral, 12.5 mg, Oral, Daily  insulin glargine, 5 Units, Subcutaneous, QAM  insulin lispro, 2 Units, Subcutaneous, TID With Meals  metFORMIN, 1,000 mg, Oral, BID With Meals  pantoprazole, 40 mg, Oral, Q AM  rosuvastatin, 20 mg, Oral, Nightly  sennosides-docusate, 2 tablet, Oral, Nightly  sodium chloride, 3 mL, Intravenous, Q12H  tobramycin-dexamethasone, 1 drop, Right Eye, Q2H While Awake  valsartan, 160 mg, Oral, Q24H        lactated ringers, 9 mL/hr, Last Rate: Stopped (12/08/20 1541)      PRN meds  •  acetaminophen **OR** acetaminophen  •  diphenhydrAMINE  •  Glycerin-Hypromellose-  •  HYDROmorphone **AND** naloxone  •  ketorolac  •  ondansetron **OR** ondansetron  •  oxyCODONE  •  sodium chloride        Acquired buried penis    Morbid obesity (CMS/[facility])    Type 2 diabetes mellitus with hyperglycemia (CMS/[facility])    Essential hypertension, benign        Assessment/Plan:  Brief Hospital Course to date:  Ray London is a 45 y.o. male with past medical history of morbid obesity who was hospitalized after surgery for acquired buried penis, and received surgical intervention on 12/8 for buried penis release.  Hospital medicine has been consulted to assist with management of diabetes and essential hypertension and it appears patient has accidentally scratched his eye postoperatively and may have corneal abrasion.  For which ophthalmology was called and they have started the patient on TobraDex  drops     Buried penis:  Management per urology.  Status post surgical intervention on      Possible corneal abrasion:  TobraDex drops     Consult ophthalmology has placed orders for drops     Essential hypertension:  Blood pressures are doing well, valsartan and hydrochlorothiazide.     Type 2 diabetes mellitus:  Prandial and basal insulin added to correction scale.  Monitor glucose and adjust further as needed.  -I have advised the patient to stop his Jardiance until peritoneal wound has healed secondary to slight increased risk for perineal infection     Morbid obesity: Weight loss recommended.  Supportive care.     DVT Prophylaxis:  LVX        Asher Page MD  12/10/20  16:28 EST        Electronically signed by Asher Page MD at 12/10/20 1841     Zac Jacobs Jr., MD at 20        POD1 s/p release of buried penis    S: Comfortable, currently on bedrest. Pain well controlled. No nausea. Reports right corneal abrasion after surgery.    AVSS  Good UO  Abd soft  Phallus dressed  VAC dressing holding suction  Hidalgo intact, urine yellow and clear    Hb 15.0, Cr 1.02, WBC 11.9    Plan:  - continue bedrest  - VAC to suction  - continue hidalgo    Electronically signed by Zac Jacobs Jr., MD at 20     Asher Block MD at 20 1315              [facility] Medicine Services  PROGRESS NOTE    Patient Name: Ray London  : 1975  MRN: [id number]    Date of Admission: 2020  Primary Care Physician: Claudette Lux, CHARLES    Subjective   Subjective     CC:  Follow-up consult for medical management diabetes and hypertension    HPI:  Patient complaining of eye pain today, thinks he may have scratched his eye after the surgery accidentally.  Asking for numbing drops.  No other new complaints.    Review of Systems  No current fevers or chills  No current shortness of breath or cough  No current nausea, vomiting, or diarrhea  No current chest pain or  palpitations      Objective   Objective     Vital Signs:   Temp:  [97.3 °F (36.3 °C)-99.2 °F (37.3 °C)] 98.7 °F (37.1 °C)  Heart Rate:  [78-87] 78  Resp:  [16-18] 16  BP: (122-140)/(77-87) 122/81        Physical Exam:  Constitutional:Awake, alert  HENT: Right eye with some erythema on the conjunctiva, no obvious globe defect, NCAT, mucous membranes moist, neck supple  Respiratory: Clear to auscultation bilaterally, respiratory effort normal, nonlabored breathing   Cardiovascular: RRR, normal radial pulses  Gastrointestinal: Positive bowel sounds, soft, nontender, nondistended  Musculoskeletal: Morbid obesity, minimal lower extremity edema, BMI is 52  Psychiatric: Appropriate affect, cooperative, conversational  Neurologic: No slurred speech or facial droop, follows commands  Skin: No rashes or jaundice, warm      Results Reviewed:  Results from last 7 days   Lab Units 12/09/20  0802   WBC 10*3/mm3 11.87*   HEMOGLOBIN g/dL 15.0   HEMATOCRIT % 45.0   PLATELETS 10*3/mm3 205     Results from last 7 days   Lab Units 12/09/20  0802   SODIUM mmol/L 133*   POTASSIUM mmol/L 4.1   CHLORIDE mmol/L 98   CO2 mmol/L 24.9   BUN mg/dL 16   CREATININE mg/dL 1.02   GLUCOSE mg/dL 174*   CALCIUM mg/dL 8.4*     Estimated Creatinine Clearance: 151.6 mL/min (by C-G formula based on SCr of 1.02 mg/dL).    Microbiology Results Abnormal     None          Imaging Results (Last 24 Hours)     ** No results found for the last 24 hours. **               I have reviewed the medications:  Scheduled Meds:amLODIPine, 5 mg, Oral, Nightly  ceFAZolin, 3 g, Intravenous, Q8H  enoxaparin, 40 mg, Subcutaneous, Daily  erythromycin, , Both Eyes, Q12H  escitalopram, 20 mg, Oral, Nightly  folic acid, 1,000 mcg, Oral, Daily  hydroCHLOROthiazide Oral, 12.5 mg, Oral, Daily  influenza vaccine, 0.5 mL, Intramuscular, Once  insulin glargine, 5 Units, Subcutaneous, QAM  insulin lispro, 2 Units, Subcutaneous, TID With Meals  metFORMIN, 1,000 mg, Oral, BID With  Meals  pantoprazole, 40 mg, Oral, Q AM  rosuvastatin, 20 mg, Oral, Nightly  sennosides-docusate, 2 tablet, Oral, Nightly  sodium chloride, 3 mL, Intravenous, Q12H  valsartan, 160 mg, Oral, Q24H      Continuous Infusions:lactated ringers, 9 mL/hr, Last Rate: Stopped (12/08/20 1541)      PRN Meds:.•  acetaminophen **OR** acetaminophen  •  diphenhydrAMINE  •  Glycerin-Hypromellose-  •  HYDROmorphone **AND** naloxone  •  ondansetron **OR** ondansetron  •  oxyCODONE  •  sodium chloride    Assessment/Plan   Assessment & Plan     Active Hospital Problems    Diagnosis  POA   • 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        Brief Hospital Course to date:  Ray London is a 45 y.o. male with past medical history of morbid obesity who was hospitalized after surgery for acquired buried penis, and received surgical intervention on 12/8 for buried penis release.  Hospital medicine has been consulted to assist with management of diabetes and essential hypertension and it appears patient has accidentally scratched his eye postoperatively and may have corneal abrasion.    Buried penis:  Management per urology.  Status post surgical intervention on 12/8    Possible corneal abrasion:  Add numbing drops today.  One-time dose per pharmacist.  Continue your with erythromycin ointment.  Pharmacist recommended ketorolac drops if pain continues.  Consult ophthalmology.    Essential hypertension:  Controlled, valsartan and hydrochlorothiazide.    Type 2 diabetes mellitus:  Prandial and basal insulin added to correction scale.  Monitor glucose and adjust further as needed.    Morbid obesity: Weight loss recommended.  Supportive care.    DVT Prophylaxis:  LVX    CODE STATUS:   Code Status and Medical Interventions:   Ordered at: 12/08/20 1338     Level Of Support Discussed With:    Patient     Code Status:    CPR     Medical Interventions (Level of Support Prior to  Arrest):    Full       Asher Block MD  20       Electronically signed by Asher Block MD at 20 1321          Consult Notes       Leydi Danielson MD at 20 1956      Consult Orders    1. Inpatient Hospitalist Consult [[id number]] ordered by Terry Conteh MD at 20 1214               CONSULT NOTE    INTERNAL MEDICINE   [facility]       Patient Identification:  Name: Ray London  Age: 45 y.o.  Sex: male  :  1975  MRN: [id number]             Date of Consultation:  20          Primary Care Physician: Claudette Lux PA-C                               Requesting Physician: nayla conteh  Reason for Consultation:diabetes, hypertension    Chief Complaint:  45 year old gentleman who was admitted following surgery by dr conteh; he has a history of diabetes and hypertension and we are asked to see him for perioperative management; he is complaining of pain of his right eye following surgery; he feels like there is something in it    History of Present Illness:   As above        Current Meds:     Current Facility-Administered Medications:   •  acetaminophen (TYLENOL) tablet 650 mg, 650 mg, Oral, Q4H PRN **OR** acetaminophen (TYLENOL) suppository 650 mg, 650 mg, Rectal, Q4H PRN, Terry Conteh MD  •  amLODIPine (NORVASC) tablet 5 mg, 5 mg, Oral, Nightly, Terry Conteh MD  •  ceFAZolin in Sodium Chloride (ANCEF) IVPB solution 3 g, 3 g, Intravenous, Q8H, Terry Conteh MD, Last Rate: 200 mL/hr at 20, 3 g at 20  •  diphenhydrAMINE (BENADRYL) capsule 25 mg, 25 mg, Oral, Q6H PRN, Terry Conteh MD  •  [START ON 2020] enoxaparin (LOVENOX) syringe 40 mg, 40 mg, Subcutaneous, Daily, Terry Conteh MD  •  escitalopram (LEXAPRO) tablet 20 mg, 20 mg, Oral, Nightly, Terry Conteh MD  •  folic acid (FOLVITE) tablet 1,000 mcg, 1,000 mcg, Oral, Daily, Terry Conteh MD  •   Glycerin-Hypromellose- (ARTIFICIAL TEARS) 0.2-0.2-1 % ophthalmic solution solution 1 drop, 1 drop, Both Eyes, Q1H PRN, Terry Conteh MD, 1 drop at 12/08/20 1847  •  HYDROmorphone (DILAUDID) injection 0.5 mg, 0.5 mg, Intravenous, Q2H PRN, 0.5 mg at 12/08/20 1844 **AND** naloxone (NARCAN) injection 0.1 mg, 0.1 mg, Intravenous, Q5 Min PRN, Terry Conteh MD  •  [START ON 12/9/2020] influenza vac split quad (FLUZONE,FLUARIX,AFLURIA,FLULAVAL) injection 0.5 mL, 0.5 mL, Intramuscular, Once, Terry Conteh MD  •  lactated ringers infusion, 9 mL/hr, Intravenous, Continuous, Kartik Galeano MD, Stopped at 12/08/20 1541  •  metFORMIN (GLUCOPHAGE) tablet 1,000 mg, 1,000 mg, Oral, BID With Meals, Terry Conteh MD  •  ondansetron (ZOFRAN) tablet 4 mg, 4 mg, Oral, Q6H PRN **OR** ondansetron (ZOFRAN) injection 4 mg, 4 mg, Intravenous, Q6H PRN, Terry Conteh MD  •  oxyCODONE (ROXICODONE) immediate release tablet 5 mg, 5 mg, Oral, Q4H PRN, Terry Conteh MD, 5 mg at 12/08/20 1457  •  rosuvastatin (CRESTOR) tablet 20 mg, 20 mg, Oral, Nightly, Terry Conteh MD  •  sennosides-docusate (PERICOLACE) 8.6-50 MG per tablet 2 tablet, 2 tablet, Oral, Nightly, Terry Conteh MD  •  sodium chloride 0.9 % flush 10 mL, 10 mL, Intravenous, PRN, Terry Conteh MD  •  sodium chloride 0.9 % flush 3 mL, 3 mL, Intravenous, Q12H, Terry Conteh MD  Allergies:  No Known Allergies      ; right Review of Systems  See history of present illness and past medical history.  Patient denies headache, dizziness, syncope, falls, trauma, change in vision, change in hearing, change in taste, changes in weight, changes in appetite, focal weakness, numbness, or paresthesia.  Patient denies chest pain, palpitations, dyspnea, orthopnea, PND, cough, sinus pressure, rhinorrhea, epistaxis, hemoptysis, nausea, vomiting,hematemesis, diarrhea, constipation or hematchezia.  Denies cold or heat intolerance,  "polydipsia, polyuria, polyphagia. Denies hematuria, pyuria, dysuria, hesitancy, frequency or urgency. Denies consumption of raw and under cooked meats foods or change in water source.  Denies fever, chills, sweats, night sweats.  Denies missing any routine medications. Remainder of ROS is negative.      Vitals:   /80 (BP Location: Left arm, Patient Position: Lying)   Pulse 85   Temp 97.4 °F (36.3 °C) (Oral)   Resp 18   Ht 182.9 cm (72\")   Wt (!) 174 kg (384 lb 3 oz)   SpO2 94%   BMI 52.11 kg/m²   I/O:     Intake/Output Summary (Last 24 hours) at 12/8/2020 1956  Last data filed at 12/8/2020 1909  Gross per 24 hour   Intake 1550 ml   Output 2050 ml   Net -500 ml     Exam:  General Appearance:    Alert, cooperative, no distress, appears stated age   Head:    Normocephalic, without obvious abnormality, atraumatic   Eyes:    PERRL, conjunctivae/corneas clear, EOM's intact, both eyes; right sclerae icteric   Ears:    Normal external ear canals, both ears   Nose:   Nares normal, septum midline, mucosa normal, no drainage    or sinus tenderness   Throat:   Lips, tongue, gums normal; oral mucosa pink and moist   Neck:   Supple, symmetrical, trachea midline, no adenopathy;     thyroid:  no enlargement/tenderness/nodules; no carotid    bruit or JVD   Back:     Symmetric, no curvature, ROM normal, no CVA tenderness   Lungs:     Decreased breath sounds bilaterally, respirations unlabored   Chest Wall:    No tenderness or deformity    Heart:    Regular rate and rhythm, S1 and S2 normal, no murmur, rub   or gallop   Abdomen:     Soft, nontender, bowel sounds active all four quadrants,     no masses, no hepatomegaly, no splenomegaly   Extremities:   Extremities normal, atraumatic, no cyanosis or edema   Pulses:   Pulses palpable in all extremities; symmetric all extremities   Skin:   Skin color normal, Skin is warm and dry,  no rashes or palpable lesions   Neurologic:   CNII-XII intact, motor strength grossly intact, " sensation grossly intact to light touch, no focal deficits noted       Data Review:  Labs in chart were reviewed.  No results found for: WBC, HGB, HCT, PLT  No results found for: NA, K, CL, CO2, BUN, CREATININE, GLUCOSE  No results found for: CALCIUM, MG, PHOS  No results found for: AST, ALT, ALKPHOS  No results found for: APTT, INR            Past Medical History:   Diagnosis Date   • Anxiety    • Depression    • Diabetes mellitus (CMS/HCC)    • ED (erectile dysfunction)    • Erectile dysfunction    • Folic acid deficiency    • GERD (gastroesophageal reflux disease)    • History of cellulitis     RT LEG KNEE TO FOOT   • Hyperlipidemia    • Hypertension    • Obesity    • Psoriasis    • Sleep apnea     NO CPAP USE   • Visual impairment        Assessment:  Active Hospital Problems    Diagnosis  POA   • 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   diabetes  Hypertension  Right eye pain  Obesity  Sleep apnea    Plan:  Will order sliding scale insulin and accu checks  Monitor blood pressure and adjust medications as indicated  Corneal abrasion protocol minus numbing drops for eye pain  Check labs in the am  Thanks for involving us in her care    Leydi Danielson MD   12/8/2020  19:56 EST      Electronically signed by Leydi Danielson MD at 12/08/20 2001           Nano Kelly, RN   Registered Nurse      Plan of Care   Signed   Date of Service:  12/11/20 0105   Creation Time:  12/11/20 0105            Signed                Problem: Adult Inpatient Plan of Care  Goal: Plan of Care Review  Outcome: Ongoing, Progressing  Flowsheets  Taken 12/11/2020 0103 by Nano Kelly, RN  Outcome Summary: VITALS AS DOCUMENTED. WOUND VAC @ 76 CONTINUOUS. MINIMAL SS DRAINAGE UNDER TEGADERM TO LEFT THIGH SKIN GRAFT SITE. REPORTS RIGHT EYE BEING LESS RED AND LESS PAINFUL TODAY. PAIN MEDICATION PRN. WILL MONITOR.  Taken 12/10/2020 1656 by Amanda Davis, RN  Plan of  Care Reviewed With: patient                  Amanda Davis RN   Registered Nurse      Plan of Care   Signed   Date of Service:  12/10/20 1657   Creation Time:  12/10/20 1657            Signed             Goal Outcome Evaluation:  Plan of Care Reviewed With: patient  Progress: no change  Outcome Summary: eyes drops given fili 2hrs as ordered, pain is improving, given dilaudid x 2 for penile and eye pain, wound vac patent, logged rolling, continue to monitor                  Lela Basurto CSW      Case Management   Progress Notes   Signed   Date of Service:  12/10/20 0935   Creation Time:  12/10/20 0935            Signed             Discharge Planning Assessment  [facility]     Patient Name: Ray London             MRN: [id number]  Today's Date: 12/10/2020                   Admit Date: 12/8/2020              Discharge Plan      Row Name 12/10/20 0932           Plan     Plan  Home with spouse; follow for wound vac needs     Patient/Family in Agreement with Plan  yes     Plan Comments  CCP met with patient at bedside. CCP role explained and face sheet verified. Patient’s PCP is Dr. Lux. Patient lives with his spouse, in a second floor condo (handrails present at the steps). Patient is independent with his ADLs and does not use DME. Patient has not used home health or been to SNF. Patient has glucometer and checks his blood sugar once a day. Patient’s preferred pharmacy is [facility]; patient denies having trouble obtaining his medications. Patient plans to return home with assistance from his wife. Patient currently on bedrest and has wound vac. CCP will follow for skilled needs and assist as needed. Racquel NGUYEN          MSK XR negative - No fracture, No dislocation, No foreign body

## 2021-07-02 NOTE — TELEPHONE ENCOUNTER
Caller: Dixie London    Relationship: Emergency Contact    Best call back number: [phone]    What medications are you currently taking:   Current Outpatient Medications on File Prior to Visit   Medication Sig Dispense Refill   • acetaminophen (Tylenol) 325 MG tablet Take 2 tablets by mouth Every 4 (Four) Hours As Needed for Moderate Pain . 100 tablet 2   • amLODIPine (NORVASC) 5 MG tablet TAKE 1 TABLET BY MOUTH DAILY FOR BLOOD PRESSURE 30 tablet 5   • bacitracin 500 UNIT/GM ointment Apply to affected area daily 30 g 0   • [facility] D3 50 MCG (2000 UT) capsule TAKE ONE CAPSULE BY MOUTH EVERY DAY (Patient taking differently: Take 2,000 Units by mouth Every Night.) 90 capsule 3   • docusate sodium (Colace) 100 MG capsule Take 1 capsule by mouth Daily As Needed for Constipation. 30 capsule 1   • Dulaglutide (Trulicity) 1.5 MG/0.5ML solution pen-injector Inject 1.5 mg under the skin into the appropriate area as directed 1 (One) Time Per Week. 4 pen 5   • Empagliflozin (Jardiance) 25 MG tablet Take 25 mg by mouth Daily. For DMII 30 tablet 5   • escitalopram (LEXAPRO) 20 MG tablet Take 1 tablet by mouth Every Night. ONE TAB DAILY FOR STRESS 90 tablet 3   • folic acid (FOLVITE) 1 MG tablet Take 1 tablet by mouth Daily. 90 tablet 3   • glucose blood test strip Use 3 times daily for blood sugar checks 100 each 0   • glucose monitor monitoring kit Please provide Accucheck glucometer kit.  Use as directed. 1 each 0   • glucose monitoring kit (FREESTYLE) monitoring kit 1 each As Needed (For glucose monitoring). 1 each 0   • Insulin Glargine (LANTUS SOLOSTAR) 100 UNIT/ML injection pen E 11.5; start with 35 units at HS may increase by 2 units every 3 days until fasting <150 5 pen 5   • Insulin Pen Needle 32G X 4 MM misc For once daily use with daily injection 100 each 3   • Lancets (accu-chek soft touch) lancets Use 3 times daily for blood sugar checks 100 each 0   • metFORMIN ER (GLUCOPHAGE-XR) 500 MG 24 hr tablet Take 2  tablets by mouth 2 (Two) Times a Day With Meals. 120 tablet 5   • naproxen (NAPROSYN) 500 MG tablet      • omeprazole (priLOSEC) 20 MG capsule Take 1 capsule by mouth Daily. For GERD 90 capsule 3   • oxyCODONE (ROXICODONE) 5 MG immediate release tablet Take 1 tablet by mouth Every 4 (Four) Hours As Needed for Moderate Pain . 15 tablet 0   • rosuvastatin (Crestor) 20 MG tablet Take 1 tablet by mouth Every Night. For cholesterol--new dose 90 tablet 3   • sodium hypochlorite (DAKIN'S 1/4 STRENGTH) 0.125 % solution topical solution 0.125% One fourths strength Dakin's solution dressing changes to left great toe twice daily. 473 mL 0   • STELARA 90 MG/ML solution prefilled syringe Injection Inject  under the skin into the appropriate area as directed Every 3 (Three) Months. DUE IN JANUARY     • valsartan-hydrochlorothiazide (DIOVAN-HCT) 160-12.5 MG per tablet Take 1 tablet by mouth Daily. for blood pressure. 30 tablet 5     No current facility-administered medications on file prior to visit.        When did you start taking these medications: EARLY LAST WEEK    Which medication are you concerned about:CEPHALEXIN    Who prescribed you this medication: [facility]    What are your concerns: PATIENT IS HAVING MAJOR DIARRHEA, NAUSEA, CHILLS.    How long have you been taking these medications: SINCE EARLY LAST WEEK    How long have you had these concerns: IN THE LAST FEW DAYS.  PATIENTS WIFE THOUGH MAYBE PATIENT HAD GOTTEN A STOMACH BUG BUT IT HAS LASTED LONGER THAN A NORMAL INFECTION.  PATIENTS WIFE QUESTIONED IF PATIENT SHOULD CONTINUE TAKING THE CEPHALEXIN OR IF HE SHOULD STOP?  PATIENT WAS GIVEN THE ANTIBIOTIC DUE TO AN INFECTION IN TOE AFTER SURGERY.

## 2021-07-06 NOTE — TELEPHONE ENCOUNTER
----- Message from Ray London sent at 7/6/2021  1:08 PM EDT -----  Regarding: Prescription Question  Contact: [phone]  Could you please send a new prescription for my blood sugar test strips? Whoever wrote the original prescription only wrote it for one fill.    Thanks.

## 2021-07-12 ENCOUNTER — OFFICE VISIT (OUTPATIENT)
Dept: WOUND CARE | Facility: HOSPITAL | Age: 46
End: 2021-07-12

## 2021-07-12 PROCEDURE — G0463 HOSPITAL OUTPT CLINIC VISIT: HCPCS

## 2021-08-02 ENCOUNTER — APPOINTMENT (OUTPATIENT)
Dept: WOUND CARE | Facility: HOSPITAL | Age: 46
End: 2021-08-02

## 2021-08-09 ENCOUNTER — OFFICE VISIT (OUTPATIENT)
Dept: WOUND CARE | Facility: HOSPITAL | Age: 46
End: 2021-08-09

## 2021-08-23 ENCOUNTER — APPOINTMENT (OUTPATIENT)
Dept: WOUND CARE | Facility: HOSPITAL | Age: 46
End: 2021-08-23

## 2021-08-25 ENCOUNTER — APPOINTMENT (OUTPATIENT)
Dept: GENERAL RADIOLOGY | Facility: HOSPITAL | Age: 46
End: 2021-08-25

## 2021-08-25 ENCOUNTER — HOSPITAL ENCOUNTER (EMERGENCY)
Facility: HOSPITAL | Age: 46
Discharge: LEFT BEFORE DISCHARGE | End: 2021-08-25

## 2021-08-25 VITALS
HEART RATE: 76 BPM | RESPIRATION RATE: 16 BRPM | TEMPERATURE: 98 F | DIASTOLIC BLOOD PRESSURE: 95 MMHG | HEIGHT: 72 IN | OXYGEN SATURATION: 98 % | BODY MASS INDEX: 53.57 KG/M2 | SYSTOLIC BLOOD PRESSURE: 151 MMHG

## 2021-08-25 PROCEDURE — 99211 OFF/OP EST MAY X REQ PHY/QHP: CPT

## 2021-08-25 RX ORDER — NAPROXEN 500 MG/1
TABLET ORAL
Qty: 60 TABLET | Refills: 0 | Status: SHIPPED | OUTPATIENT
Start: 2021-08-25 | End: 2021-09-23

## 2021-08-25 NOTE — ED NOTES
Pt ambulatory to triage from home with c/o left toe pain - states noticed bruising to left second toe Saturday, tonight states entire toe appears bruised, tip is red and skin is peeling.  Pt is diabetic.  Pt wearing mask in triage.  Triage personnel wore appropriate PPE       Mary Alice Sims RN  08/25/21 [id number]

## 2021-08-30 ENCOUNTER — LAB REQUISITION (OUTPATIENT)
Dept: LAB | Facility: HOSPITAL | Age: 46
End: 2021-08-30

## 2021-08-30 ENCOUNTER — OFFICE VISIT (OUTPATIENT)
Dept: WOUND CARE | Facility: HOSPITAL | Age: 46
End: 2021-08-30

## 2021-08-30 DIAGNOSIS — S91.302A UNSPECIFIED OPEN WOUND, LEFT FOOT, INITIAL ENCOUNTER: ICD-10-CM

## 2021-08-30 PROCEDURE — 87070 CULTURE OTHR SPECIMN AEROBIC: CPT | Performed by: NURSE PRACTITIONER

## 2021-08-30 PROCEDURE — 87147 CULTURE TYPE IMMUNOLOGIC: CPT | Performed by: NURSE PRACTITIONER

## 2021-08-30 PROCEDURE — 87075 CULTR BACTERIA EXCEPT BLOOD: CPT | Performed by: NURSE PRACTITIONER

## 2021-08-30 PROCEDURE — 87205 SMEAR GRAM STAIN: CPT | Performed by: NURSE PRACTITIONER

## 2021-08-31 RX ORDER — ACETAMINOPHEN 160 MG
2000 TABLET,DISINTEGRATING ORAL NIGHTLY
Qty: 90 CAPSULE | Refills: 3 | Status: SHIPPED | OUTPATIENT
Start: 2021-08-31

## 2021-09-02 LAB
BACTERIA SPEC AEROBE CULT: ABNORMAL
BACTERIA SPEC AEROBE CULT: ABNORMAL
GRAM STN SPEC: ABNORMAL

## 2021-09-04 LAB — BACTERIA SPEC ANAEROBE CULT: NORMAL

## 2021-09-13 ENCOUNTER — APPOINTMENT (OUTPATIENT)
Dept: WOUND CARE | Facility: HOSPITAL | Age: 46
End: 2021-09-13

## 2021-09-20 ENCOUNTER — TELEPHONE (OUTPATIENT)
Dept: FAMILY MEDICINE CLINIC | Facility: CLINIC | Age: 46
End: 2021-09-20

## 2021-09-20 ENCOUNTER — OFFICE VISIT (OUTPATIENT)
Dept: WOUND CARE | Facility: HOSPITAL | Age: 46
End: 2021-09-20

## 2021-09-20 DIAGNOSIS — E11.65 TYPE 2 DIABETES MELLITUS WITH HYPERGLYCEMIA, WITH LONG-TERM CURRENT USE OF INSULIN (HCC): Primary | ICD-10-CM

## 2021-09-20 DIAGNOSIS — Z79.4 TYPE 2 DIABETES MELLITUS WITH HYPERGLYCEMIA, WITH LONG-TERM CURRENT USE OF INSULIN (HCC): Primary | ICD-10-CM

## 2021-09-20 PROCEDURE — 11055 PARING/CUTG B9 HYPRKER LES 1: CPT

## 2021-09-20 NOTE — TELEPHONE ENCOUNTER
----- Message from Ray London sent at 9/20/2021  8:27 AM EDT -----  Regarding: RE:endocrine  Contact: [phone]  Please place another referral

## 2021-09-23 RX ORDER — NAPROXEN 500 MG/1
TABLET ORAL
Qty: 60 TABLET | Refills: 0 | Status: SHIPPED | OUTPATIENT
Start: 2021-09-23 | End: 2021-10-18 | Stop reason: SDUPTHER

## 2021-09-27 ENCOUNTER — OFFICE VISIT (OUTPATIENT)
Dept: WOUND CARE | Facility: HOSPITAL | Age: 46
End: 2021-09-27

## 2021-10-04 ENCOUNTER — APPOINTMENT (OUTPATIENT)
Dept: WOUND CARE | Facility: HOSPITAL | Age: 46
End: 2021-10-04

## 2021-10-11 ENCOUNTER — OFFICE VISIT (OUTPATIENT)
Dept: WOUND CARE | Facility: HOSPITAL | Age: 46
End: 2021-10-11

## 2021-10-11 PROCEDURE — G0463 HOSPITAL OUTPT CLINIC VISIT: HCPCS

## 2021-10-18 ENCOUNTER — OFFICE VISIT (OUTPATIENT)
Dept: FAMILY MEDICINE CLINIC | Facility: CLINIC | Age: 46
End: 2021-10-18

## 2021-10-18 VITALS
HEIGHT: 72 IN | RESPIRATION RATE: 16 BRPM | TEMPERATURE: 97.5 F | BODY MASS INDEX: 42.66 KG/M2 | OXYGEN SATURATION: 100 % | HEART RATE: 68 BPM | SYSTOLIC BLOOD PRESSURE: 125 MMHG | WEIGHT: 315 LBS | DIASTOLIC BLOOD PRESSURE: 81 MMHG

## 2021-10-18 DIAGNOSIS — E78.2 MIXED HYPERLIPIDEMIA: ICD-10-CM

## 2021-10-18 DIAGNOSIS — Z79.4 TYPE 2 DIABETES MELLITUS WITH HYPERGLYCEMIA, WITH LONG-TERM CURRENT USE OF INSULIN (HCC): Primary | ICD-10-CM

## 2021-10-18 DIAGNOSIS — L40.9 PSORIASIS: ICD-10-CM

## 2021-10-18 DIAGNOSIS — E66.01 MORBID OBESITY (HCC): ICD-10-CM

## 2021-10-18 DIAGNOSIS — Z12.11 SCREEN FOR COLON CANCER: ICD-10-CM

## 2021-10-18 DIAGNOSIS — K21.9 GASTROESOPHAGEAL REFLUX DISEASE WITHOUT ESOPHAGITIS: ICD-10-CM

## 2021-10-18 DIAGNOSIS — F41.1 GAD (GENERALIZED ANXIETY DISORDER): ICD-10-CM

## 2021-10-18 DIAGNOSIS — E55.9 VITAMIN D DEFICIENCY: ICD-10-CM

## 2021-10-18 DIAGNOSIS — E11.65 TYPE 2 DIABETES MELLITUS WITH HYPERGLYCEMIA, WITH LONG-TERM CURRENT USE OF INSULIN (HCC): Primary | ICD-10-CM

## 2021-10-18 DIAGNOSIS — I10 ESSENTIAL HYPERTENSION, BENIGN: ICD-10-CM

## 2021-10-18 DIAGNOSIS — E53.8 FOLIC ACID DEFICIENCY: ICD-10-CM

## 2021-10-18 PROCEDURE — 99214 OFFICE O/P EST MOD 30 MIN: CPT | Performed by: PHYSICIAN ASSISTANT

## 2021-10-18 RX ORDER — NAPROXEN 500 MG/1
500 TABLET ORAL 2 TIMES DAILY WITH MEALS
Qty: 60 TABLET | Refills: 5 | Status: SHIPPED | OUTPATIENT
Start: 2021-10-18

## 2021-10-18 RX ORDER — METFORMIN HYDROCHLORIDE 500 MG/1
1000 TABLET, EXTENDED RELEASE ORAL 2 TIMES DAILY WITH MEALS
Qty: 120 TABLET | Refills: 5 | Status: SHIPPED | OUTPATIENT
Start: 2021-10-18 | End: 2022-08-24 | Stop reason: SDUPTHER

## 2021-10-18 RX ORDER — DULAGLUTIDE 3 MG/.5ML
3 INJECTION, SOLUTION SUBCUTANEOUS WEEKLY
Qty: 4 PEN | Refills: 5 | Status: SHIPPED | OUTPATIENT
Start: 2021-10-18 | End: 2021-11-24

## 2021-10-18 RX ORDER — AMLODIPINE BESYLATE 5 MG/1
5 TABLET ORAL DAILY
Qty: 30 TABLET | Refills: 5 | Status: SHIPPED | OUTPATIENT
Start: 2021-10-18 | End: 2022-08-24 | Stop reason: SDUPTHER

## 2021-10-18 RX ORDER — VALSARTAN AND HYDROCHLOROTHIAZIDE 160; 12.5 MG/1; MG/1
1 TABLET, FILM COATED ORAL DAILY
Qty: 30 TABLET | Refills: 5 | Status: SHIPPED | OUTPATIENT
Start: 2021-10-18 | End: 2022-08-24 | Stop reason: SDUPTHER

## 2021-10-18 NOTE — PROGRESS NOTES
"Subjective   Ray London is a 46 y.o. male.     History of Present Illness    Since the last visit, he has overall felt fairly well.  He has Essential Hypertension and well controlled on current medication, GERD controlled on PPI Rx, Hyperlipidemia with goals met with current Rx and Vitamin D deficiency and will update labs for continued management.  he has been compliant with current medications have reviewed them.  The patient denies medication side effects.  Will refill medications. /81 (BP Location: Left arm, Patient Position: Sitting, Cuff Size: Adult)   Pulse 68   Temp 97.5 °F (36.4 °C)   Resp 16   Ht 182.9 cm (72.01\")   Wt (!) 179 kg (394 lb)   SpO2 100%   BMI 53.42 kg/m²   130s/70-80  Results for orders placed or performed in visit on 08/30/21   Wound Culture - Wound, Toe, Left    Specimen: Toe, Left; Wound   Result Value Ref Range    Wound Culture (A)      Light growth (2+) Streptococcus agalactiae (Group B)    Wound Culture Light growth (2+) Normal Skin Kandace     Gram Stain No WBCs or organisms seen    Anaerobic Culture - Wound, Toe, Left    Specimen: Toe, Left; Wound   Result Value Ref Range    Anaerobic Culture No anaerobes isolated at 5 days    Long term use of NSAIDs can lead to heart disease and strokes, as well as GI bleeding/ulcers, and cause kidney disease. Advise labs to monitor renal functions to be done at least every 6 months.      Did require surgery left big toe---wound---did finally heal    Derm---psoriasis  Glucose better 170-180  I will raise dose Trulicity   Ray London male 46 y.o., /81 (BP Location: Left arm, Patient Position: Sitting, Cuff Size: Adult)   Pulse 68   Temp 97.5 °F (36.4 °C)   Resp 16   Ht 182.9 cm (72.01\")   Wt (!) 179 kg (394 lb)   SpO2 100%   BMI 53.42 kg/m²   who presents today for follow up of Anxiety.  He reports medication is working well, patient desires to continue on Rx, and needs refill. Onset of symptoms was approximately " several years ago.  He denies current suicidal and homicidal ideation. Risk factors are lifestyle of multiple roles and chronic illness.  Previous treatment includes current Rx. He complains of the following medication side effects: none. The patient declines to go to counseling..      The following portions of the patient's history were reviewed and updated as appropriate: allergies, current medications, past family history, past medical history, past social history, past surgical history and problem list.    Review of Systems   Constitutional: Negative for activity change, appetite change and unexpected weight change.   HENT: Negative for nosebleeds and trouble swallowing.    Eyes: Negative for pain and visual disturbance.   Respiratory: Negative for chest tightness, shortness of breath and wheezing.    Cardiovascular: Negative for chest pain and palpitations.   Gastrointestinal: Negative for abdominal pain and blood in stool.   Endocrine: Negative.    Genitourinary: Negative for difficulty urinating and hematuria.   Musculoskeletal: Negative for joint swelling.   Skin: Positive for color change and rash.   Allergic/Immunologic: Negative.    Neurological: Negative for syncope and speech difficulty.   Hematological: Negative for adenopathy.   Psychiatric/Behavioral: Negative for agitation and confusion.   All other systems reviewed and are negative.      Objective   Physical Exam  Vitals and nursing note reviewed.   Constitutional:       General: He is not in acute distress.     Appearance: He is well-developed. He is obese. He is not diaphoretic.   HENT:      Head: Normocephalic.      Right Ear: External ear normal.      Left Ear: External ear normal.   Eyes:      General: No scleral icterus.     Conjunctiva/sclera: Conjunctivae normal.      Pupils: Pupils are equal, round, and reactive to light.   Neck:      Vascular: No carotid bruit.   Cardiovascular:      Rate and Rhythm: Normal rate and regular rhythm.       Pulses: Normal pulses.      Heart sounds: Normal heart sounds. No murmur heard.      Pulmonary:      Effort: Pulmonary effort is normal.      Breath sounds: Normal breath sounds. No rales.   Musculoskeletal:         General: Normal range of motion.      Cervical back: Normal range of motion and neck supple.      Right lower leg: Edema present.      Left lower leg: Edema present.      Comments: Also venous stasis dermatitis--brown macules tib/fib--wear stockings   Skin:     General: Skin is warm and dry.      Findings: Lesion and rash present.   Neurological:      General: No focal deficit present.      Mental Status: He is alert and oriented to person, place, and time.   Psychiatric:         Mood and Affect: Mood normal. Affect is not inappropriate.         Behavior: Behavior normal.         Thought Content: Thought content normal.         Judgment: Judgment normal.         Assessment/Plan   Diagnoses and all orders for this visit:    1. Type 2 diabetes mellitus with hyperglycemia, without long-term current use of insulin (HCC) (Primary)    2. Essential hypertension, benign    3. Morbid obesity (HCC)    4. Folic acid deficiency    5. Vitamin D deficiency    6. Mixed hyperlipidemia    7. Psoriasis    8. KERI (generalized anxiety disorder)    9. Gastroesophageal reflux disease without esophagitis      Raise dose Trulicity ---need better glucose control  Still want him to see DR Valente-----for DM  Cont folic acid  Cont Lexarpo--works for anxiety  On Lantus 50 units daily--may go down if dropping d/t increase Trulicity dose  Long term use of NSAIDs can lead to heart disease and strokes, as well as GI bleeding/ulcers, and cause kidney disease. Advise labs to monitor renal functions to be done at least every 6 months.  Plan, Ray London, was seen today.  he was seen for HTN and continue medication, DMII and adjusted/added medication, GERD and will continue on PPI medication, Hyperlipidemia and will continue  current medication and Vitamin D deficiency and will update labs .  Cologuard  See derm psoriasis

## 2021-10-18 NOTE — PATIENT INSTRUCTIONS
Diabetes Mellitus and Nutrition, Adult  When you have diabetes, or diabetes mellitus, it is very important to have healthy eating habits because your blood sugar (glucose) levels are greatly affected by what you eat and drink. Eating healthy foods in the right amounts, at about the same times every day, can help you:  · Control your blood glucose.  · Lower your risk of heart disease.  · Improve your blood pressure.  · Reach or maintain a healthy weight.  What can affect my meal plan?  Every person with diabetes is different, and each person has different needs for a meal plan. Your health care provider may recommend that you work with a dietitian to make a meal plan that is best for you. Your meal plan may vary depending on factors such as:  · The calories you need.  · The medicines you take.  · Your weight.  · Your blood glucose, blood pressure, and cholesterol levels.  · Your activity level.  · Other health conditions you have, such as heart or kidney disease.  How do carbohydrates affect me?  Carbohydrates, also called carbs, affect your blood glucose level more than any other type of food. Eating carbs naturally raises the amount of glucose in your blood. Carb counting is a method for keeping track of how many carbs you eat. Counting carbs is important to keep your blood glucose at a healthy level, especially if you use insulin or take certain oral diabetes medicines.  It is important to know how many carbs you can safely have in each meal. This is different for every person. Your dietitian can help you calculate how many carbs you should have at each meal and for each snack.  How does alcohol affect me?  Alcohol can cause a sudden decrease in blood glucose (hypoglycemia), especially if you use insulin or take certain oral diabetes medicines. Hypoglycemia can be a life-threatening condition. Symptoms of hypoglycemia, such as sleepiness, dizziness, and confusion, are similar to symptoms of having too much  "alcohol.  · Do not drink alcohol if:  ? Your health care provider tells you not to drink.  ? You are pregnant, may be pregnant, or are planning to become pregnant.  · If you drink alcohol:  ? Do not drink on an empty stomach.  ? Limit how much you use to:  § 0-1 drink a day for women.  § 0-2 drinks a day for men.  ? Be aware of how much alcohol is in your drink. In the [place] one drink equals one 12 oz bottle of beer (355 mL), one 5 oz glass of wine (148 mL), or one 1½ oz glass of hard liquor (44 mL).  ? Keep yourself hydrated with water, diet soda, or unsweetened iced tea.  § Keep in mind that regular soda, juice, and other mixers may contain a lot of sugar and must be counted as carbs.  What are tips for following this plan?    Reading food labels  · Start by checking the serving size on the \"Nutrition Facts\" label of packaged foods and drinks. The amount of calories, carbs, fats, and other nutrients listed on the label is based on one serving of the item. Many items contain more than one serving per package.  · Check the total grams (g) of carbs in one serving. You can calculate the number of servings of carbs in one serving by dividing the total carbs by 15. For example, if a food has 30 g of total carbs per serving, it would be equal to 2 servings of carbs.  · Check the number of grams (g) of saturated fats and trans fats in one serving. Choose foods that have a low amount or none of these fats.  · Check the number of milligrams (mg) of salt (sodium) in one serving. Most people should limit total sodium intake to less than 2,300 mg per day.  · Always check the nutrition information of foods labeled as \"low-fat\" or \"nonfat.\" These foods may be higher in added sugar or refined carbs and should be avoided.  · Talk to your dietitian to identify your daily goals for nutrients listed on the label.  Shopping  · Avoid buying canned, pre-made, or processed foods. These foods tend to be high in fat, sodium, and added " sugar.  · Shop around the outside edge of the grocery store. This is where you will most often find fresh fruits and vegetables, bulk grains, fresh meats, and fresh dairy.  Cooking  · Use low-heat cooking methods, such as baking, instead of high-heat cooking methods like deep frying.  · Cook using healthy oils, such as olive, canola, or sunflower oil.  · Avoid cooking with butter, cream, or high-fat meats.  Meal planning  · Eat meals and snacks regularly, preferably at the same times every day. Avoid going long periods of time without eating.  · Eat foods that are high in fiber, such as fresh fruits, vegetables, beans, and whole grains. Talk with your dietitian about how many servings of carbs you can eat at each meal.  · Eat 4-6 oz (112-168 g) of lean protein each day, such as lean meat, chicken, fish, eggs, or tofu. One ounce (oz) of lean protein is equal to:  ? 1 oz (28 g) of meat, chicken, or fish.  ? 1 egg.  ? ¼ cup (62 g) of tofu.  · Eat some foods each day that contain healthy fats, such as avocado, nuts, seeds, and fish.  What foods should I eat?  Fruits  Berries. Apples. Oranges. Peaches. Apricots. Plums. Grapes. [place]. Papaya. Pomegranate. Kiwi. Cherries.  Vegetables  Lettuce. Spinach. Leafy greens, including kale, chard, moncho greens, and mustard greens. Beets. Cauliflower. Cabbage. Broccoli. Carrots. Green beans. Tomatoes. Peppers. Onions. Cucumbers. [place] sprouts.  Grains  Whole grains, such as whole-wheat or whole-grain bread, crackers, tortillas, cereal, and pasta. Unsweetened oatmeal. Quinoa. Brown or wild rice.  Meats and other proteins  Seafood. Poultry without skin. Lean cuts of poultry and beef. Tofu. Nuts. Seeds.  Dairy  Low-fat or fat-free dairy products such as milk, yogurt, and cheese.  The items listed above may not be a complete list of foods and beverages you can eat. Contact a dietitian for more information.  What foods should I avoid?  Fruits  Fruits canned with  syrup.  Vegetables  Canned vegetables. Frozen vegetables with butter or cream sauce.  Grains  Refined white flour and flour products such as bread, pasta, snack foods, and cereals. Avoid all processed foods.  Meats and other proteins  Fatty cuts of meat. Poultry with skin. Breaded or fried meats. Processed meat. Avoid saturated fats.  Dairy  Full-fat yogurt, cheese, or milk.  Beverages  Sweetened drinks, such as soda or iced tea.  The items listed above may not be a complete list of foods and beverages you should avoid. Contact a dietitian for more information.  Questions to ask a health care provider  · Do I need to meet with a diabetes educator?  · Do I need to meet with a dietitian?  · What number can I call if I have questions?  · When are the best times to check my blood glucose?  Where to find more information:  · American Diabetes Association: diabetes.org  · Academy of Nutrition and Dietetics: www.eatright.org  · National [place] of Diabetes and Digestive and Kidney Diseases: www.niddk.nih.gov  · Association of Diabetes Care and Education Specialists: www.diabeteseducator.org  Summary  · It is important to have healthy eating habits because your blood sugar (glucose) levels are greatly affected by what you eat and drink.  · A healthy meal plan will help you control your blood glucose and maintain a healthy lifestyle.  · Your health care provider may recommend that you work with a dietitian to make a meal plan that is best for you.  · Keep in mind that carbohydrates (carbs) and alcohol have immediate effects on your blood glucose levels. It is important to count carbs and to use alcohol carefully.  This information is not intended to replace advice given to you by your health care provider. Make sure you discuss any questions you have with your health care provider.  Document Revised: 11/24/2020 Document Reviewed: 11/24/2020  [employer] Patient Education © 2021 [employer]      Hypertension, Adult  High  "blood pressure (hypertension) is when the force of blood pumping through the arteries is too strong. The arteries are the blood vessels that carry blood from the heart throughout the body. Hypertension forces the heart to work harder to pump blood and may cause arteries to become narrow or stiff. Untreated or uncontrolled hypertension can cause a heart attack, heart failure, a stroke, kidney disease, and other problems.  A blood pressure reading consists of a higher number over a lower number. Ideally, your blood pressure should be below 120/80. The first (\"top\") number is called the systolic pressure. It is a measure of the pressure in your arteries as your heart beats. The second (\"bottom\") number is called the diastolic pressure. It is a measure of the pressure in your arteries as the heart relaxes.  What are the causes?  The exact cause of this condition is not known. There are some conditions that result in or are related to high blood pressure.  What increases the risk?  Some risk factors for high blood pressure are under your control. The following factors may make you more likely to develop this condition:  · Smoking.  · Having type 2 diabetes mellitus, high cholesterol, or both.  · Not getting enough exercise or physical activity.  · Being overweight.  · Having too much fat, sugar, calories, or salt (sodium) in your diet.  · Drinking too much alcohol.  Some risk factors for high blood pressure may be difficult or impossible to change. Some of these factors include:  · Having chronic kidney disease.  · Having a family history of high blood pressure.  · Age. Risk increases with age.  · Race. You may be at higher risk if you are .  · Gender. Men are at higher risk than women before age 45. After age 65, women are at higher risk than men.  · Having obstructive sleep apnea.  · Stress.  What are the signs or symptoms?  High blood pressure may not cause symptoms. Very high blood pressure " (hypertensive crisis) may cause:  · Headache.  · Anxiety.  · Shortness of breath.  · Nosebleed.  · Nausea and vomiting.  · Vision changes.  · Severe chest pain.  · Seizures.  How is this diagnosed?  This condition is diagnosed by measuring your blood pressure while you are seated, with your arm resting on a flat surface, your legs uncrossed, and your feet flat on the floor. The cuff of the blood pressure monitor will be placed directly against the skin of your upper arm at the level of your heart. It should be measured at least twice using the same arm. Certain conditions can cause a difference in blood pressure between your right and left arms.  Certain factors can cause blood pressure readings to be lower or higher than normal for a short period of time:  · When your blood pressure is higher when you are in a health care provider's office than when you are at home, this is called white coat hypertension. Most people with this condition do not need medicines.  · When your blood pressure is higher at home than when you are in a health care provider's office, this is called masked hypertension. Most people with this condition may need medicines to control blood pressure.  If you have a high blood pressure reading during one visit or you have normal blood pressure with other risk factors, you may be asked to:  · Return on a different day to have your blood pressure checked again.  · Monitor your blood pressure at home for 1 week or longer.  If you are diagnosed with hypertension, you may have other blood or imaging tests to help your health care provider understand your overall risk for other conditions.  How is this treated?  This condition is treated by making healthy lifestyle changes, such as eating healthy foods, exercising more, and reducing your alcohol intake. Your health care provider may prescribe medicine if lifestyle changes are not enough to get your blood pressure under control, and if:  · Your systolic  blood pressure is above 130.  · Your diastolic blood pressure is above 80.  Your personal target blood pressure may vary depending on your medical conditions, your age, and other factors.  Follow these instructions at home:  Eating and drinking    · Eat a diet that is high in fiber and potassium, and low in sodium, added sugar, and fat. An example eating plan is called the DASH (Dietary Approaches to Stop Hypertension) diet. To eat this way:  ? Eat plenty of fresh fruits and vegetables. Try to fill one half of your plate at each meal with fruits and vegetables.  ? Eat whole grains, such as whole-wheat pasta, brown rice, or whole-grain bread. Fill about one fourth of your plate with whole grains.  ? Eat or drink low-fat dairy products, such as skim milk or low-fat yogurt.  ? Avoid fatty cuts of meat, processed or cured meats, and poultry with skin. Fill about one fourth of your plate with lean proteins, such as fish, chicken without skin, beans, eggs, or tofu.  ? Avoid pre-made and processed foods. These tend to be higher in sodium, added sugar, and fat.  · Reduce your daily sodium intake. Most people with hypertension should eat less than 1,500 mg of sodium a day.  · Do not drink alcohol if:  ? Your health care provider tells you not to drink.  ? You are pregnant, may be pregnant, or are planning to become pregnant.  · If you drink alcohol:  ? Limit how much you use to:  § 0-1 drink a day for women.  § 0-2 drinks a day for men.  ? Be aware of how much alcohol is in your drink. In the [place] one drink equals one 12 oz bottle of beer (355 mL), one 5 oz glass of wine (148 mL), or one 1½ oz glass of hard liquor (44 mL).    Lifestyle    · Work with your health care provider to maintain a healthy body weight or to lose weight. Ask what an ideal weight is for you.  · Get at least 30 minutes of exercise most days of the week. Activities may include walking, swimming, or biking.  · Include exercise to strengthen your muscles  (resistance exercise), such as Pilates or lifting weights, as part of your weekly exercise routine. Try to do these types of exercises for 30 minutes at least 3 days a week.  · Do not use any products that contain nicotine or tobacco, such as cigarettes, e-cigarettes, and chewing tobacco. If you need help quitting, ask your health care provider.  · Monitor your blood pressure at home as told by your health care provider.  · Keep all follow-up visits as told by your health care provider. This is important.    Medicines  · Take over-the-counter and prescription medicines only as told by your health care provider. Follow directions carefully. Blood pressure medicines must be taken as prescribed.  · Do not skip doses of blood pressure medicine. Doing this puts you at risk for problems and can make the medicine less effective.  · Ask your health care provider about side effects or reactions to medicines that you should watch for.  Contact a health care provider if you:  · Think you are having a reaction to a medicine you are taking.  · Have headaches that keep coming back (recurring).  · Feel dizzy.  · Have swelling in your ankles.  · Have trouble with your vision.  Get help right away if you:  · Develop a severe headache or confusion.  · Have unusual weakness or numbness.  · Feel faint.  · Have severe pain in your chest or abdomen.  · Vomit repeatedly.  · Have trouble breathing.  Summary  · Hypertension is when the force of blood pumping through your arteries is too strong. If this condition is not controlled, it may put you at risk for serious complications.  · Your personal target blood pressure may vary depending on your medical conditions, your age, and other factors. For most people, a normal blood pressure is less than 120/80.  · Hypertension is treated with lifestyle changes, medicines, or a combination of both. Lifestyle changes include losing weight, eating a healthy, low-sodium diet, exercising more, and  limiting alcohol.  This information is not intended to replace advice given to you by your health care provider. Make sure you discuss any questions you have with your health care provider.  Document Revised: 08/28/2019 Document Reviewed: 08/28/2019  [employer] Patient Education © 2021 [employer]

## 2021-10-20 LAB
25(OH)D3+25(OH)D2 SERPL-MCNC: 24.8 NG/ML (ref 30–100)
ALBUMIN SERPL-MCNC: 4.6 G/DL (ref 4–5)
ALBUMIN/CREAT UR: 17 MG/G CREAT (ref 0–29)
ALBUMIN/GLOB SERPL: 1.7 {RATIO} (ref 1.2–2.2)
ALP SERPL-CCNC: 77 IU/L (ref 44–121)
ALT SERPL-CCNC: 34 IU/L (ref 0–44)
AST SERPL-CCNC: 18 IU/L (ref 0–40)
BASOPHILS # BLD AUTO: 0.1 X10E3/UL (ref 0–0.2)
BASOPHILS NFR BLD AUTO: 1 %
BILIRUB SERPL-MCNC: 0.3 MG/DL (ref 0–1.2)
BUN SERPL-MCNC: 13 MG/DL (ref 6–24)
BUN/CREAT SERPL: 12 (ref 9–20)
C PEPTIDE SERPL-MCNC: 5.9 NG/ML (ref 1.1–4.4)
CALCIUM SERPL-MCNC: 9 MG/DL (ref 8.7–10.2)
CHLORIDE SERPL-SCNC: 98 MMOL/L (ref 96–106)
CHOLEST SERPL-MCNC: 136 MG/DL (ref 100–199)
CO2 SERPL-SCNC: 25 MMOL/L (ref 20–29)
CREAT SERPL-MCNC: 1.07 MG/DL (ref 0.76–1.27)
CREAT UR-MCNC: 69.1 MG/DL
EOSINOPHIL # BLD AUTO: 0.3 X10E3/UL (ref 0–0.4)
EOSINOPHIL NFR BLD AUTO: 3 %
ERYTHROCYTE [DISTWIDTH] IN BLOOD BY AUTOMATED COUNT: 13.2 % (ref 11.6–15.4)
FOLATE SERPL-MCNC: 8.1 NG/ML
GLOBULIN SER CALC-MCNC: 2.7 G/DL (ref 1.5–4.5)
GLUCOSE SERPL-MCNC: 243 MG/DL (ref 65–99)
HBA1C MFR BLD: 9.8 % (ref 4.8–5.6)
HCT VFR BLD AUTO: 48.5 % (ref 37.5–51)
HDLC SERPL-MCNC: 36 MG/DL
HGB BLD-MCNC: 15.7 G/DL (ref 13–17.7)
IMM GRANULOCYTES # BLD AUTO: 0.1 X10E3/UL (ref 0–0.1)
IMM GRANULOCYTES NFR BLD AUTO: 1 %
LDLC SERPL CALC-MCNC: 62 MG/DL (ref 0–99)
LYMPHOCYTES # BLD AUTO: 2.6 X10E3/UL (ref 0.7–3.1)
LYMPHOCYTES NFR BLD AUTO: 27 %
MCH RBC QN AUTO: 28 PG (ref 26.6–33)
MCHC RBC AUTO-ENTMCNC: 32.4 G/DL (ref 31.5–35.7)
MCV RBC AUTO: 87 FL (ref 79–97)
MICROALBUMIN UR-MCNC: 11.6 UG/ML
MONOCYTES # BLD AUTO: 0.6 X10E3/UL (ref 0.1–0.9)
MONOCYTES NFR BLD AUTO: 7 %
NEUTROPHILS # BLD AUTO: 5.6 X10E3/UL (ref 1.4–7)
NEUTROPHILS NFR BLD AUTO: 61 %
PLATELET # BLD AUTO: 269 X10E3/UL (ref 150–450)
POTASSIUM SERPL-SCNC: 4.3 MMOL/L (ref 3.5–5.2)
PROT SERPL-MCNC: 7.3 G/DL (ref 6–8.5)
RBC # BLD AUTO: 5.6 X10E6/UL (ref 4.14–5.8)
SODIUM SERPL-SCNC: 137 MMOL/L (ref 134–144)
T3FREE SERPL-MCNC: 3.7 PG/ML (ref 2–4.4)
T4 FREE SERPL-MCNC: 1.26 NG/DL (ref 0.82–1.77)
TRIGL SERPL-MCNC: 236 MG/DL (ref 0–149)
TSH SERPL DL<=0.005 MIU/L-ACNC: 3.62 UIU/ML (ref 0.45–4.5)
VIT B12 SERPL-MCNC: 650 PG/ML (ref 232–1245)
VLDLC SERPL CALC-MCNC: 38 MG/DL (ref 5–40)
WBC # BLD AUTO: 9.3 X10E3/UL (ref 3.4–10.8)

## 2021-11-24 RX ORDER — DULAGLUTIDE 3 MG/.5ML
3 INJECTION, SOLUTION SUBCUTANEOUS WEEKLY
Qty: 1 PEN | Refills: 2 | Status: SHIPPED | OUTPATIENT
Start: 2021-11-24 | End: 2022-07-18

## 2021-12-30 RX ORDER — INSULIN GLARGINE 100 [IU]/ML
INJECTION, SOLUTION SUBCUTANEOUS
Qty: 15 PEN | Refills: 3 | Status: SHIPPED | OUTPATIENT
Start: 2021-12-30 | End: 2022-05-18

## 2022-02-09 ENCOUNTER — TELEMEDICINE (OUTPATIENT)
Dept: FAMILY MEDICINE CLINIC | Facility: CLINIC | Age: 47
End: 2022-02-09

## 2022-02-09 DIAGNOSIS — U09.9 POST COVID-19 CONDITION, UNSPECIFIED: Primary | ICD-10-CM

## 2022-02-09 DIAGNOSIS — R53.83 OTHER FATIGUE: ICD-10-CM

## 2022-02-09 DIAGNOSIS — R06.02 SHORTNESS OF BREATH: ICD-10-CM

## 2022-02-09 DIAGNOSIS — J01.40 ACUTE PANSINUSITIS, RECURRENCE NOT SPECIFIED: ICD-10-CM

## 2022-02-09 DIAGNOSIS — R05.8 COUGH PRODUCTIVE OF YELLOW SPUTUM: ICD-10-CM

## 2022-02-09 PROCEDURE — 99214 OFFICE O/P EST MOD 30 MIN: CPT

## 2022-02-09 RX ORDER — AMOXICILLIN AND CLAVULANATE POTASSIUM 875; 125 MG/1; MG/1
1 TABLET, FILM COATED ORAL 2 TIMES DAILY
Qty: 20 TABLET | Refills: 0 | Status: SHIPPED | OUTPATIENT
Start: 2022-02-09 | End: 2022-02-19

## 2022-02-09 RX ORDER — BENZONATATE 100 MG/1
100 CAPSULE ORAL 3 TIMES DAILY PRN
Qty: 30 CAPSULE | Refills: 1 | Status: SHIPPED | OUTPATIENT
Start: 2022-02-09

## 2022-02-09 RX ORDER — ALBUTEROL SULFATE 90 UG/1
2 AEROSOL, METERED RESPIRATORY (INHALATION) EVERY 4 HOURS PRN
Qty: 8 G | Refills: 1 | Status: SHIPPED | OUTPATIENT
Start: 2022-02-09

## 2022-02-09 NOTE — PATIENT INSTRUCTIONS
Sinusitis, Adult  Sinusitis is inflammation of your sinuses. Sinuses are hollow spaces in the bones around your face. Your sinuses are located:  · Around your eyes.  · In the middle of your forehead.  · Behind your nose.  · In your cheekbones.  Mucus normally drains out of your sinuses. When your nasal tissues become inflamed or swollen, mucus can become trapped or blocked. This allows bacteria, viruses, and fungi to grow, which leads to infection. Most infections of the sinuses are caused by a virus.  Sinusitis can develop quickly. It can last for up to 4 weeks (acute) or for more than 12 weeks (chronic). Sinusitis often develops after a cold.  What are the causes?  This condition is caused by anything that creates swelling in the sinuses or stops mucus from draining. This includes:  · Allergies.  · Asthma.  · Infection from bacteria or viruses.  · Deformities or blockages in your nose or sinuses.  · Abnormal growths in the nose (nasal polyps).  · Pollutants, such as chemicals or irritants in the air.  · Infection from fungi (rare).  What increases the risk?  You are more likely to develop this condition if you:  · Have a weak body defense system (immune system).  · Do a lot of swimming or diving.  · Overuse nasal sprays.  · Smoke.  What are the signs or symptoms?  The main symptoms of this condition are pain and a feeling of pressure around the affected sinuses. Other symptoms include:  · Stuffy nose or congestion.  · Thick drainage from your nose.  · Swelling and warmth over the affected sinuses.  · Headache.  · Upper toothache.  · A cough that may get worse at night.  · Extra mucus that collects in the throat or the back of the nose (postnasal drip).  · Decreased sense of smell and taste.  · Fatigue.  · A fever.  · Sore throat.  · Bad breath.  How is this diagnosed?  This condition is diagnosed based on:  · Your symptoms.  · Your medical history.  · A physical exam.  · Tests to find out if your condition is  acute or chronic. This may include:  ? Checking your nose for nasal polyps.  ? Viewing your sinuses using a device that has a light (endoscope).  ? Testing for allergies or bacteria.  ? Imaging tests, such as an MRI or CT scan.  In rare cases, a bone biopsy may be done to rule out more serious types of fungal sinus disease.  How is this treated?  Treatment for sinusitis depends on the cause and whether your condition is chronic or acute.  · If caused by a virus, your symptoms should go away on their own within 10 days. You may be given medicines to relieve symptoms. They include:  ? Medicines that shrink swollen nasal passages (topical intranasal decongestants).  ? Medicines that treat allergies (antihistamines).  ? A spray that eases inflammation of the nostrils (topical intranasal corticosteroids).  ? Rinses that help get rid of thick mucus in your nose (nasal saline washes).  · If caused by bacteria, your health care provider may recommend waiting to see if your symptoms improve. Most bacterial infections will get better without antibiotic medicine. You may be given antibiotics if you have:  ? A severe infection.  ? A weak immune system.  · If caused by narrow nasal passages or nasal polyps, you may need to have surgery.  Follow these instructions at home:  Medicines  · Take, use, or apply over-the-counter and prescription medicines only as told by your health care provider. These may include nasal sprays.  · If you were prescribed an antibiotic medicine, take it as told by your health care provider. Do not stop taking the antibiotic even if you start to feel better.  Hydrate and humidify    · Drink enough fluid to keep your urine pale yellow. Staying hydrated will help to thin your mucus.  · Use a cool mist humidifier to keep the humidity level in your home above 50%.  · Inhale steam for 10-15 minutes, 3-4 times a day, or as told by your health care provider. You can do this in the bathroom while a hot shower is  running.  · Limit your exposure to cool or dry air.    Rest  · Rest as much as possible.  · Sleep with your head raised (elevated).  · Make sure you get enough sleep each night.  General instructions    · Apply a warm, moist washcloth to your face 3-4 times a day or as told by your health care provider. This will help with discomfort.  · Wash your hands often with soap and water to reduce your exposure to germs. If soap and water are not available, use hand .  · Do not smoke. Avoid being around people who are smoking (secondhand smoke).  · Keep all follow-up visits as told by your health care provider. This is important.    Contact a health care provider if:  · You have a fever.  · Your symptoms get worse.  · Your symptoms do not improve within 10 days.  Get help right away if:  · You have a severe headache.  · You have persistent vomiting.  · You have severe pain or swelling around your face or eyes.  · You have vision problems.  · You develop confusion.  · Your neck is stiff.  · You have trouble breathing.  Summary  · Sinusitis is soreness and inflammation of your sinuses. Sinuses are hollow spaces in the bones around your face.  · This condition is caused by nasal tissues that become inflamed or swollen. The swelling traps or blocks the flow of mucus. This allows bacteria, viruses, and fungi to grow, which leads to infection.  · If you were prescribed an antibiotic medicine, take it as told by your health care provider. Do not stop taking the antibiotic even if you start to feel better.  · Keep all follow-up visits as told by your health care provider. This is important.  This information is not intended to replace advice given to you by your health care provider. Make sure you discuss any questions you have with your health care provider.  Document Revised: 05/20/2019 Document Reviewed: 05/20/2019  [employer] Patient Education © 2021 [employer]    Shortness of Breath, Adult  Shortness of breath is when  a person has trouble breathing enough air or when a person feels like she or he is having trouble breathing in enough air. Shortness of breath could be a sign of a medical problem.  Follow these instructions at home:    · Pay attention to any changes in your symptoms.  · Do not use any products that contain nicotine or tobacco, such as cigarettes, e-cigarettes, and chewing tobacco.  · Do not smoke. Smoking is a common cause of shortness of breath. If you need help quitting, ask your health care provider.  · Avoid things that can irritate your airways, such as:  ? Mold.  ? Dust.  ? Air pollution.  ? Chemical fumes.  ? Things that can cause allergy symptoms (allergens), if you have allergies.  · Keep your living space clean and free of mold and dust.  · Rest as needed. Slowly return to your usual activities.  · Take over-the-counter and prescription medicines only as told by your health care provider. This includes oxygen therapy and inhaled medicines.  · Keep all follow-up visits as told by your health care provider. This is important.  Contact a health care provider if:  · Your condition does not improve as soon as expected.  · You have a hard time doing your normal activities, even after you rest.  · You have new symptoms.  Get help right away if:  · Your shortness of breath gets worse.  · You have shortness of breath when you are resting.  · You feel light-headed or you faint.  · You have a cough that is not controlled with medicines.  · You cough up blood.  · You have pain with breathing.  · You have pain in your chest, arms, shoulders, or abdomen.  · You have a fever.  · You cannot walk up stairs or exercise the way that you normally do.  These symptoms may represent a serious problem that is an emergency. Do not wait to see if the symptoms will go away. Get medical help right away. Call your local emergency services (911 in the [place] Do not drive yourself to the hospital.  Summary  · Shortness of breath is  when a person has trouble breathing enough air. It can be a sign of a medical problem.  · Avoid things that irritate your lungs, such as smoking, pollution, mold, and dust.  · Pay attention to changes in your symptoms and contact your health care provider if you have a hard time completing daily activities because of shortness of breath.  This information is not intended to replace advice given to you by your health care provider. Make sure you discuss any questions you have with your health care provider.  Document Revised: 05/20/2019 Document Reviewed: 05/20/2019  [employer] Patient Education © 2021 [employer]

## 2022-02-09 NOTE — PROGRESS NOTES
Mode of Visit: Video  You have chosen to receive care through a telehealth visit.  Do you consent to use a video/audio connection for your medical care today? Yes   The visit included audio and video interaction. No technical issues occurred during this visit.    Subjective       Chief Complaint   Patient presents with   • Shortness of Breath   • Cough   • Fatigue   • Loss of tase and smell         HPI:        Ray is a 46 y.o. male who presents to  [facility] today.    Ray London 46 y.o. male who presents for evaluation of cough, shortness of air, fatigue, and tested positive for Covid-19. Symptoms include congestion, post nasal drip, productive cough, cough described as productive of yellow sputum, sinus pain, fatigue, and shortness of breath.  Onset of symptoms was 2 weeks ago, stable since that time. Patient denies wheezing, hemoptysis, pleuritic chest pain, fever, ear drainage, eye discharge, diarrhea, vomiting, vertigo, sneezing, chills, sweats, epistaxis, high fever, and joint pain.   Evaluation to date: none Treatment to date:  OTC cough suppressant.     The patient tested positive for Covid-19 on 01/28/2022.      Symptom onset: 01/27/2022  Treatment to date: [facility]    Review of Systems   Constitutional: Positive for fatigue. Negative for appetite change, chills and fever.   HENT: Positive for congestion (head), postnasal drip and sinus pressure. Negative for dental problem, drooling, ear discharge, ear pain, facial swelling, hearing loss, mouth sores, nosebleeds, rhinorrhea, sneezing, sore throat, tinnitus, trouble swallowing and voice change.    Eyes: Negative for visual disturbance.   Respiratory: Positive for cough (productive, yellow sputum) and shortness of breath. Negative for chest tightness and wheezing.    Cardiovascular: Negative for chest pain, palpitations and leg swelling.   Gastrointestinal: Negative for abdominal pain,  constipation, diarrhea, nausea and vomiting.   Genitourinary: Negative for dysuria, frequency and urgency.   Musculoskeletal: Negative for gait problem, myalgias and neck pain.   Skin: Negative for rash.   Neurological: Positive for dizziness. Negative for syncope, weakness and light-headedness.   Psychiatric/Behavioral: Negative for dysphoric mood. The patient is not nervous/anxious.             PE:   Objective   There were no vitals filed for this visit.     There is no height or weight on file to calculate BMI.    Physical Exam   Constitutional: He appears well-developed and well-nourished. No distress.   HENT:   Head: Normocephalic.   Nose: Right sinus exhibits maxillary sinus tenderness and frontal sinus tenderness. Left sinus exhibits maxillary sinus tenderness and frontal sinus tenderness.   Neck: Neck normal appearance.  Pulmonary/Chest: Effort normal.  No respiratory distress.  No respiratory distress during video visit. Pulmonary effort appeared normal.   Neurological: He is alert.   Skin: He is not diaphoretic.   Psychiatric: He has a normal mood and affect.             The following data was reviewed by: PHILIP Loving on 02/09/2022:  SARS COV2 (COVID19) AND INFLUENZA A AND B, NAAT (01/28/2022 11:02)                A/P:     Assessment/Plan   Diagnoses and all orders for this visit:    1. Post covid-19 condition, unspecified (Primary)  -     amoxicillin-clavulanate (Augmentin) 875-125 MG per tablet; Take 1 tablet by mouth 2 (Two) Times a Day for 10 days.  Dispense: 20 tablet; Refill: 0  -     benzonatate (Tessalon Perles) 100 MG capsule; Take 1 capsule by mouth 3 (Three) Times a Day As Needed for Cough.  Dispense: 30 capsule; Refill: 1  -     albuterol sulfate  (90 Base) MCG/ACT inhaler; Inhale 2 puffs Every 4 (Four) Hours As Needed for Wheezing or Shortness of Air.  Dispense: 8 g; Refill: 1    2. Acute pansinusitis, recurrence not specified  -     amoxicillin-clavulanate (Augmentin) 875-125  MG per tablet; Take 1 tablet by mouth 2 (Two) Times a Day for 10 days.  Dispense: 20 tablet; Refill: 0    3. Cough productive of yellow sputum  -     benzonatate (Tessalon Perles) 100 MG capsule; Take 1 capsule by mouth 3 (Three) Times a Day As Needed for Cough.  Dispense: 30 capsule; Refill: 1  -     albuterol sulfate  (90 Base) MCG/ACT inhaler; Inhale 2 puffs Every 4 (Four) Hours As Needed for Wheezing or Shortness of Air.  Dispense: 8 g; Refill: 1    4. Shortness of breath  -     benzonatate (Tessalon Perles) 100 MG capsule; Take 1 capsule by mouth 3 (Three) Times a Day As Needed for Cough.  Dispense: 30 capsule; Refill: 1  -     albuterol sulfate  (90 Base) MCG/ACT inhaler; Inhale 2 puffs Every 4 (Four) Hours As Needed for Wheezing or Shortness of Air.  Dispense: 8 g; Refill: 1    5. Other fatigue        I spent 24 minutes caring for Ray on this date of service. This time includes time spent by me in the following activities:preparing for the visit, reviewing tests, obtaining and/or reviewing a separately obtained history, performing a medically appropriate examination and/or evaluation , counseling and educating the patient/family/caregiver, ordering medications, tests, or procedures and documenting information in the medical record     Follow up:   Return if symptoms worsen or fail to improve.      -Take all medications as prescribed and until completed.  -Covid test was positive on 01/28/2022.  -Monitor for fever and take Tylenol as needed.  Drink plenty of fluids and get plenty of rest.  -Use cool-mist humidifier as needed.  -Seek immediate medical attention for fever unrelieved by Tylenol, chest pain, shortness of breath, sharp back pain, or any other worsening signs or symptoms.  -He has completed required amount of quarantine.  -I recommend an over-the-counter vitamin regimen to boost your immune system of the following: Vitamin D3 5,000 IU daily, vitamin C 500-1,000 mg twice daily,  Quercetin 250 mg twice daily, Zinc 100 mg/day, and Melatonin 5-10 mg before bedtime.  You can purchase a pulse oximeter at any local pharmacy to monitor your oxygen saturations.  Call 911 if you have shortness of breath, sharp chest pain, sharp pain in your back, or a fever that will not come down by Tylenol.  -The patient verbalized understanding of all instructions given today.

## 2022-03-21 ENCOUNTER — PRIOR AUTHORIZATION (OUTPATIENT)
Dept: FAMILY MEDICINE CLINIC | Facility: CLINIC | Age: 47
End: 2022-03-21

## 2022-05-18 DIAGNOSIS — I10 PRIMARY HYPERTENSION: ICD-10-CM

## 2022-05-18 DIAGNOSIS — E78.2 MIXED HYPERLIPIDEMIA: Primary | ICD-10-CM

## 2022-05-18 DIAGNOSIS — Z79.4 TYPE 2 DIABETES MELLITUS WITH HYPERGLYCEMIA, WITH LONG-TERM CURRENT USE OF INSULIN: ICD-10-CM

## 2022-05-18 DIAGNOSIS — E11.65 TYPE 2 DIABETES MELLITUS WITH HYPERGLYCEMIA, WITH LONG-TERM CURRENT USE OF INSULIN: ICD-10-CM

## 2022-05-18 DIAGNOSIS — E55.9 VITAMIN D DEFICIENCY: ICD-10-CM

## 2022-05-18 RX ORDER — INSULIN GLARGINE 100 [IU]/ML
INJECTION, SOLUTION SUBCUTANEOUS
Qty: 4 PEN | Refills: 3 | Status: SHIPPED | OUTPATIENT
Start: 2022-05-18 | End: 2022-08-24 | Stop reason: SDUPTHER

## 2022-06-07 ENCOUNTER — REFERRAL TRIAGE (OUTPATIENT)
Dept: CASE MANAGEMENT | Facility: OTHER | Age: 47
End: 2022-06-07

## 2022-06-07 DIAGNOSIS — E11.65 UNCONTROLLED TYPE 2 DIABETES MELLITUS WITH HYPERGLYCEMIA, WITHOUT LONG-TERM CURRENT USE OF INSULIN: ICD-10-CM

## 2022-06-07 DIAGNOSIS — E66.01 MORBID OBESITY: ICD-10-CM

## 2022-06-07 DIAGNOSIS — I10 PRIMARY HYPERTENSION: ICD-10-CM

## 2022-06-07 DIAGNOSIS — E78.2 MIXED HYPERLIPIDEMIA: ICD-10-CM

## 2022-06-07 DIAGNOSIS — E11.65 TYPE 2 DIABETES MELLITUS WITH HYPERGLYCEMIA, WITH LONG-TERM CURRENT USE OF INSULIN: ICD-10-CM

## 2022-06-07 DIAGNOSIS — Z79.4 TYPE 2 DIABETES MELLITUS WITH HYPERGLYCEMIA, WITH LONG-TERM CURRENT USE OF INSULIN: ICD-10-CM

## 2022-06-07 DIAGNOSIS — F41.1 GAD (GENERALIZED ANXIETY DISORDER): ICD-10-CM

## 2022-06-07 DIAGNOSIS — I10 HYPERTENSION, UNSPECIFIED TYPE: Primary | ICD-10-CM

## 2022-06-07 DIAGNOSIS — E78.2 MIXED HYPERLIPIDEMIA: Primary | ICD-10-CM

## 2022-06-07 DIAGNOSIS — L40.9 PSORIASIS: ICD-10-CM

## 2022-06-07 RX ORDER — ESCITALOPRAM OXALATE 20 MG/1
20 TABLET ORAL NIGHTLY
Qty: 30 TABLET | Refills: 0 | Status: SHIPPED | OUTPATIENT
Start: 2022-06-07 | End: 2022-07-11

## 2022-06-07 RX ORDER — ROSUVASTATIN CALCIUM 20 MG/1
20 TABLET, COATED ORAL NIGHTLY
Qty: 30 TABLET | Refills: 0 | Status: SHIPPED | OUTPATIENT
Start: 2022-06-07 | End: 2022-07-07

## 2022-06-07 RX ORDER — FOLIC ACID 1 MG/1
TABLET ORAL
Qty: 90 TABLET | Refills: 3 | Status: SHIPPED | OUTPATIENT
Start: 2022-06-07

## 2022-06-13 ENCOUNTER — PATIENT OUTREACH (OUTPATIENT)
Dept: CASE MANAGEMENT | Facility: OTHER | Age: 47
End: 2022-06-13

## 2022-06-13 NOTE — OUTREACH NOTE
Social Work Assessment  Questions/Answers    Flowsheet Row Most Recent Value   Referral Source physician   Reason for Consult insurance concerns   Preferred Language English   People in Home alone   Current Living Arrangements condominium   Primary Care Provided by self   Employment Status employed full-time        SDOH updated and reviewed with the patient during this program:  Financial Resource Strain: Low Risk    • Difficulty of Paying Living Expenses: Not hard at all      Food Insecurity: No Food Insecurity   • Worried About Running Out of Food in the Last Year: Never true   • Ran Out of Food in the Last Year: Never true      Transportation Needs: No Transportation Needs   • Lack of Transportation (Medical): No   • Lack of Transportation (Non-Medical): No      Housing Stability: Low Risk    • Unable to Pay for Housing in the Last Year: No   • Number of Places Lived in the Last Year: 1   • Unstable Housing in the Last Year: No     Patient Outreach    MSW spoke with patient regarding health insurance options which are available. Patient stated he is currently at work and unable to speak with MSW. Patient stated that he is currently working and receives his health insurance benefits through employer. Patient stated he does not have additional needs and will contact MSW if needed in the future. MSW to sign off, but available in the future if needed.     CAROLINE PINEDA -   Ambulatory Case Management    6/13/2022, 13:57 EDT

## 2022-06-20 ENCOUNTER — TELEPHONE (OUTPATIENT)
Dept: CASE MANAGEMENT | Facility: OTHER | Age: 47
End: 2022-06-20

## 2022-06-20 ENCOUNTER — PATIENT OUTREACH (OUTPATIENT)
Dept: CASE MANAGEMENT | Facility: OTHER | Age: 47
End: 2022-06-20

## 2022-06-20 NOTE — OUTREACH NOTE
AMBULATORY CASE MANAGEMENT NOTE    Name and Relationship of Patient/Support Person: Ray London R - Self      Incoming call from the patient returning a VM from RN-ACM.  Discussed services of CCM and HRCM.  Patient declines at this time.       DG SUAREZ  Ambulatory Case Management    6/20/2022, 12:27 EDT

## 2022-07-07 RX ORDER — ROSUVASTATIN CALCIUM 20 MG/1
20 TABLET, COATED ORAL NIGHTLY
Qty: 30 TABLET | Refills: 0 | Status: SHIPPED | OUTPATIENT
Start: 2022-07-07 | End: 2022-08-24 | Stop reason: SDUPTHER

## 2022-07-11 RX ORDER — BLOOD SUGAR DIAGNOSTIC
STRIP MISCELLANEOUS
Qty: 100 EACH | Refills: 11 | Status: SHIPPED | OUTPATIENT
Start: 2022-07-11

## 2022-07-11 RX ORDER — ESCITALOPRAM OXALATE 20 MG/1
20 TABLET ORAL NIGHTLY
Qty: 30 TABLET | Refills: 0 | Status: SHIPPED | OUTPATIENT
Start: 2022-07-11 | End: 2022-08-24 | Stop reason: SDUPTHER

## 2022-07-18 ENCOUNTER — DOCUMENTATION (OUTPATIENT)
Dept: FAMILY MEDICINE CLINIC | Facility: CLINIC | Age: 47
End: 2022-07-18

## 2022-07-18 RX ORDER — DULAGLUTIDE 3 MG/.5ML
3 INJECTION, SOLUTION SUBCUTANEOUS WEEKLY
Qty: 2 PEN | Refills: 0 | Status: SHIPPED | OUTPATIENT
Start: 2022-07-18 | End: 2022-08-24 | Stop reason: SDUPTHER

## 2022-07-25 RX ORDER — VALSARTAN AND HYDROCHLOROTHIAZIDE 160; 12.5 MG/1; MG/1
1 TABLET, FILM COATED ORAL DAILY
Qty: 30 TABLET | Refills: 5 | OUTPATIENT
Start: 2022-07-25

## 2022-08-02 RX ORDER — ROSUVASTATIN CALCIUM 20 MG/1
20 TABLET, COATED ORAL NIGHTLY
Qty: 30 TABLET | Refills: 0 | OUTPATIENT
Start: 2022-08-02

## 2022-08-17 RX ORDER — ESCITALOPRAM OXALATE 20 MG/1
TABLET ORAL
Qty: 30 TABLET | Refills: 0 | OUTPATIENT
Start: 2022-08-17

## 2022-08-18 RX ORDER — EMPAGLIFLOZIN 25 MG/1
TABLET, FILM COATED ORAL
Qty: 30 TABLET | Refills: 0 | OUTPATIENT
Start: 2022-08-18

## 2022-08-24 ENCOUNTER — TELEPHONE (OUTPATIENT)
Dept: PEDIATRICS | Facility: OTHER | Age: 47
End: 2022-08-24

## 2022-08-24 RX ORDER — ESCITALOPRAM OXALATE 20 MG/1
20 TABLET ORAL NIGHTLY
Qty: 30 TABLET | Refills: 0 | Status: SHIPPED | OUTPATIENT
Start: 2022-08-24 | End: 2022-08-24

## 2022-08-24 RX ORDER — VALSARTAN AND HYDROCHLOROTHIAZIDE 160; 12.5 MG/1; MG/1
1 TABLET, FILM COATED ORAL DAILY
Qty: 30 TABLET | Refills: 0 | Status: SHIPPED | OUTPATIENT
Start: 2022-08-24 | End: 2022-08-24

## 2022-08-24 RX ORDER — AMLODIPINE BESYLATE 5 MG/1
5 TABLET ORAL DAILY
Qty: 30 TABLET | Refills: 5 | Status: SHIPPED | OUTPATIENT
Start: 2022-08-24

## 2022-08-24 RX ORDER — INSULIN GLARGINE 100 [IU]/ML
35 INJECTION, SOLUTION SUBCUTANEOUS NIGHTLY
Qty: 4 PEN | Refills: 0 | Status: SHIPPED | OUTPATIENT
Start: 2022-08-24 | End: 2022-08-24 | Stop reason: SDUPTHER

## 2022-08-24 RX ORDER — METFORMIN HYDROCHLORIDE 500 MG/1
1000 TABLET, EXTENDED RELEASE ORAL 2 TIMES DAILY WITH MEALS
Qty: 120 TABLET | Refills: 0 | Status: SHIPPED | OUTPATIENT
Start: 2022-08-24 | End: 2022-08-24

## 2022-08-24 RX ORDER — DULAGLUTIDE 3 MG/.5ML
3 INJECTION, SOLUTION SUBCUTANEOUS WEEKLY
Qty: 4 PEN | Refills: 0 | Status: SHIPPED | OUTPATIENT
Start: 2022-08-24

## 2022-08-24 RX ORDER — VALSARTAN AND HYDROCHLOROTHIAZIDE 160; 12.5 MG/1; MG/1
1 TABLET, FILM COATED ORAL DAILY
Qty: 30 TABLET | Refills: 5 | OUTPATIENT
Start: 2022-08-24

## 2022-08-24 RX ORDER — ESCITALOPRAM OXALATE 20 MG/1
20 TABLET ORAL NIGHTLY
Qty: 30 TABLET | Refills: 0 | Status: SHIPPED | OUTPATIENT
Start: 2022-08-24

## 2022-08-24 RX ORDER — METFORMIN HYDROCHLORIDE 500 MG/1
1000 TABLET, EXTENDED RELEASE ORAL 2 TIMES DAILY WITH MEALS
Qty: 120 TABLET | Refills: 0 | Status: SHIPPED | OUTPATIENT
Start: 2022-08-24

## 2022-08-24 RX ORDER — ROSUVASTATIN CALCIUM 20 MG/1
20 TABLET, COATED ORAL NIGHTLY
Qty: 30 TABLET | Refills: 0 | Status: SHIPPED | OUTPATIENT
Start: 2022-08-24 | End: 2022-08-24

## 2022-08-24 RX ORDER — AMLODIPINE BESYLATE 5 MG/1
5 TABLET ORAL DAILY
Qty: 30 TABLET | Refills: 5 | Status: SHIPPED | OUTPATIENT
Start: 2022-08-24 | End: 2022-08-24

## 2022-08-24 RX ORDER — ROSUVASTATIN CALCIUM 20 MG/1
20 TABLET, COATED ORAL NIGHTLY
Qty: 30 TABLET | Refills: 0 | OUTPATIENT
Start: 2022-08-24

## 2022-08-24 RX ORDER — DULAGLUTIDE 3 MG/.5ML
3 INJECTION, SOLUTION SUBCUTANEOUS WEEKLY
Qty: 4 PEN | Refills: 0 | Status: SHIPPED | OUTPATIENT
Start: 2022-08-24 | End: 2022-08-24

## 2022-08-24 RX ORDER — VALSARTAN AND HYDROCHLOROTHIAZIDE 160; 12.5 MG/1; MG/1
1 TABLET, FILM COATED ORAL DAILY
Qty: 30 TABLET | Refills: 0 | Status: SHIPPED | OUTPATIENT
Start: 2022-08-24

## 2022-08-24 RX ORDER — ROSUVASTATIN CALCIUM 20 MG/1
20 TABLET, COATED ORAL NIGHTLY
Qty: 30 TABLET | Refills: 0 | Status: SHIPPED | OUTPATIENT
Start: 2022-08-24

## 2022-08-24 RX ORDER — INSULIN GLARGINE 100 [IU]/ML
35 INJECTION, SOLUTION SUBCUTANEOUS NIGHTLY
Qty: 4 PEN | Refills: 0 | Status: SHIPPED | OUTPATIENT
Start: 2022-08-24

## 2022-08-24 NOTE — TELEPHONE ENCOUNTER
Caller: Dixie London    Relationship: Emergency Contact    Best call back number: [phone]    What was the call regarding: PATIENT'S WIFE STATED THAT THEY WERE UNAWARE THAT PATIENT HAD BEEN DISMISSED FROM THE PRACTICE. PATIENT'S WIFE STATED THAT PATIENT HAD NOT RECEIVED A LETTER. PATIENT'S WIFE STATED THAT HE IS NEEDING HIS INSULIN, METFORMIN, BLOOD PRESSURE MEDICATION, DIABETES MEDICATION, AND ESCITALOPRAM REFILLED. PATIENT'S WIFE STATED THAT HE IS OUT OF TWO OF HIS MEDICATIONS AND THE REST HE ONLY HAS A DAY LEFT OF. PATIENT'S WIFE STATED THAT IF THEY NEED TO FIND HIM A NEW PROVIDER THEY WILL, BUT THEY WILL NEED AT LEAST A MONTH'S SUPPLY OF HIS MEDICATIONS SINCE THESE ARE LIFE THREATENING MEDICATIONS FOR HIM TO BE WITHOUT. PLEASE ADVISE.     [facility]/pharmacy #[id number] - [place], [place] - [address]  - [phone]  - [phone] FX    Do you require a callback: YES

## 2022-09-14 RX ORDER — INSULIN GLARGINE 100 [IU]/ML
INJECTION, SOLUTION SUBCUTANEOUS
Refills: 3 | OUTPATIENT
Start: 2022-09-14

## 2022-09-18 RX ORDER — ROSUVASTATIN CALCIUM 20 MG/1
TABLET, COATED ORAL
Qty: 30 TABLET | Refills: 0 | OUTPATIENT
Start: 2022-09-18

## 2022-09-22 RX ORDER — ESCITALOPRAM OXALATE 20 MG/1
TABLET ORAL
Qty: 30 TABLET | Refills: 0 | OUTPATIENT
Start: 2022-09-22

## 2023-03-07 ENCOUNTER — TRANSCRIBE ORDERS (OUTPATIENT)
Dept: PHYSICAL THERAPY | Facility: CLINIC | Age: 48
End: 2023-03-07
Payer: COMMERCIAL

## 2023-03-07 DIAGNOSIS — M54.42 CHRONIC BILATERAL LOW BACK PAIN WITH BILATERAL SCIATICA: Primary | ICD-10-CM

## 2023-03-07 DIAGNOSIS — R20.2 TINGLING IN EXTREMITIES: ICD-10-CM

## 2023-03-07 DIAGNOSIS — M54.41 CHRONIC BILATERAL LOW BACK PAIN WITH BILATERAL SCIATICA: Primary | ICD-10-CM

## 2023-03-07 DIAGNOSIS — G89.29 CHRONIC BILATERAL LOW BACK PAIN WITH BILATERAL SCIATICA: Primary | ICD-10-CM

## 2023-11-02 ENCOUNTER — HOSPITAL ENCOUNTER (EMERGENCY)
Facility: HOSPITAL | Age: 48
Discharge: HOME | End: 2023-11-02
Attending: EMERGENCY MEDICINE
Payer: COMMERCIAL

## 2023-11-02 ENCOUNTER — APPOINTMENT (OUTPATIENT)
Dept: GENERAL RADIOLOGY | Facility: HOSPITAL | Age: 48
End: 2023-11-02
Payer: COMMERCIAL

## 2023-11-02 VITALS
HEART RATE: 112 BPM | BODY MASS INDEX: 42.66 KG/M2 | OXYGEN SATURATION: 98 % | HEIGHT: 72 IN | WEIGHT: 315 LBS | TEMPERATURE: 98.3 F | DIASTOLIC BLOOD PRESSURE: 115 MMHG | SYSTOLIC BLOOD PRESSURE: 180 MMHG | RESPIRATION RATE: 16 BRPM

## 2023-11-02 DIAGNOSIS — D84.9 IMMUNOSUPPRESSION: ICD-10-CM

## 2023-11-02 DIAGNOSIS — L97.511 DIABETIC ULCER OF TOE OF RIGHT FOOT ASSOCIATED WITH TYPE 2 DIABETES MELLITUS, LIMITED TO BREAKDOWN OF SKIN: Primary | ICD-10-CM

## 2023-11-02 DIAGNOSIS — E11.621 DIABETIC ULCER OF TOE OF RIGHT FOOT ASSOCIATED WITH TYPE 2 DIABETES MELLITUS, LIMITED TO BREAKDOWN OF SKIN: Primary | ICD-10-CM

## 2023-11-02 LAB
ALBUMIN SERPL-MCNC: 5 G/DL (ref 3.5–5.2)
ALBUMIN/GLOB SERPL: 1.7 G/DL
ALP SERPL-CCNC: 68 U/L (ref 39–117)
ALT SERPL W P-5'-P-CCNC: 40 U/L (ref 1–41)
ANION GAP SERPL CALCULATED.3IONS-SCNC: 10.2 MMOL/L (ref 5–15)
AST SERPL-CCNC: 26 U/L (ref 1–40)
BASOPHILS # BLD AUTO: 0.09 10*3/MM3 (ref 0–0.2)
BASOPHILS NFR BLD AUTO: 1 % (ref 0–1.5)
BILIRUB SERPL-MCNC: 0.6 MG/DL (ref 0–1.2)
BUN SERPL-MCNC: 17 MG/DL (ref 6–20)
BUN/CREAT SERPL: 13.8 (ref 7–25)
CALCIUM SPEC-SCNC: 9.9 MG/DL (ref 8.6–10.5)
CHLORIDE SERPL-SCNC: 99 MMOL/L (ref 98–107)
CO2 SERPL-SCNC: 24.8 MMOL/L (ref 22–29)
CREAT SERPL-MCNC: 1.23 MG/DL (ref 0.76–1.27)
CRP SERPL-MCNC: <0.3 MG/DL (ref 0–0.5)
D-LACTATE SERPL-SCNC: 1.3 MMOL/L (ref 0.5–2)
DEPRECATED RDW RBC AUTO: 39.2 FL (ref 37–54)
EGFRCR SERPLBLD CKD-EPI 2021: 72.9 ML/MIN/1.73
EOSINOPHIL # BLD AUTO: 0.19 10*3/MM3 (ref 0–0.4)
EOSINOPHIL NFR BLD AUTO: 2.1 % (ref 0.3–6.2)
ERYTHROCYTE [DISTWIDTH] IN BLOOD BY AUTOMATED COUNT: 12.6 % (ref 12.3–15.4)
ERYTHROCYTE [SEDIMENTATION RATE] IN BLOOD: <1 MM/HR (ref 0–15)
GLOBULIN UR ELPH-MCNC: 2.9 GM/DL
GLUCOSE SERPL-MCNC: 201 MG/DL (ref 65–99)
HCT VFR BLD AUTO: 48.2 % (ref 37.5–51)
HGB BLD-MCNC: 16.5 G/DL (ref 13–17.7)
IMM GRANULOCYTES # BLD AUTO: 0.06 10*3/MM3 (ref 0–0.05)
IMM GRANULOCYTES NFR BLD AUTO: 0.6 % (ref 0–0.5)
LYMPHOCYTES # BLD AUTO: 2.5 10*3/MM3 (ref 0.7–3.1)
LYMPHOCYTES NFR BLD AUTO: 27.1 % (ref 19.6–45.3)
MCH RBC QN AUTO: 29.4 PG (ref 26.6–33)
MCHC RBC AUTO-ENTMCNC: 34.2 G/DL (ref 31.5–35.7)
MCV RBC AUTO: 85.9 FL (ref 79–97)
MONOCYTES # BLD AUTO: 0.56 10*3/MM3 (ref 0.1–0.9)
MONOCYTES NFR BLD AUTO: 6.1 % (ref 5–12)
NEUTROPHILS NFR BLD AUTO: 5.84 10*3/MM3 (ref 1.7–7)
NEUTROPHILS NFR BLD AUTO: 63.1 % (ref 42.7–76)
NRBC BLD AUTO-RTO: 0 /100 WBC (ref 0–0.2)
PLATELET # BLD AUTO: 276 10*3/MM3 (ref 140–450)
PMV BLD AUTO: 9.4 FL (ref 6–12)
POTASSIUM SERPL-SCNC: 4.8 MMOL/L (ref 3.5–5.2)
PROT SERPL-MCNC: 7.9 G/DL (ref 6–8.5)
RBC # BLD AUTO: 5.61 10*6/MM3 (ref 4.14–5.8)
SODIUM SERPL-SCNC: 134 MMOL/L (ref 136–145)
WBC NRBC COR # BLD: 9.24 10*3/MM3 (ref 3.4–10.8)

## 2023-11-02 PROCEDURE — 99283 EMERGENCY DEPT VISIT LOW MDM: CPT

## 2023-11-02 PROCEDURE — 73630 X-RAY EXAM OF FOOT: CPT

## 2023-11-02 PROCEDURE — 85025 COMPLETE CBC W/AUTO DIFF WBC: CPT | Performed by: PHYSICIAN ASSISTANT

## 2023-11-02 PROCEDURE — 86140 C-REACTIVE PROTEIN: CPT | Performed by: PHYSICIAN ASSISTANT

## 2023-11-02 PROCEDURE — 85652 RBC SED RATE AUTOMATED: CPT | Performed by: PHYSICIAN ASSISTANT

## 2023-11-02 PROCEDURE — 83605 ASSAY OF LACTIC ACID: CPT | Performed by: PHYSICIAN ASSISTANT

## 2023-11-02 PROCEDURE — 80053 COMPREHEN METABOLIC PANEL: CPT | Performed by: PHYSICIAN ASSISTANT

## 2023-11-02 PROCEDURE — 36415 COLL VENOUS BLD VENIPUNCTURE: CPT

## 2023-11-02 RX ORDER — ATOMOXETINE 25 MG/1
CAPSULE ORAL DAILY
COMMUNITY

## 2023-11-02 RX ORDER — SULFAMETHOXAZOLE AND TRIMETHOPRIM 400; 80 MG/1; MG/1
1 TABLET ORAL 2 TIMES DAILY
COMMUNITY

## 2023-11-02 NOTE — DISCHARGE INSTRUCTIONS
Perform wound care daily  Recheck with your PCP in 2 days for wound check  Call the wound care clinic today to schedule ASAP follow-up    Return to the ER for any worsening

## 2023-11-02 NOTE — Clinical Note
[facility] EMERGENCY DEPARTMENT  [address] [facility] [address]  Phone: [phone]    _____________________ accompanied Ray London to the emergency department on 11/2/2023. They may return to work on 11/03/2023.        Thank you for choosing [facility].    Benji Harrison, RN

## 2023-11-02 NOTE — ED PROVIDER NOTES
MD ATTESTATION NOTE    The MALACHI and I have discussed this patient's history, physical exam, and treatment plan.  I have reviewed the documentation and personally had a face to face interaction with the patient. I affirm the documentation and agree with the treatment and plan.  The attached note describes my personal findings.    I provided a substantive portion of the care of this patient. I personally performed the physical exam, in its entirety.    Independent Historians: Patient, family    A complete HPI/ROS/PMH/PSH/SH/FH are unobtainable due to: Nothing    Chronic or social conditions impacting patient care (social determinants of health): None    Ray London is a 48 y.o. male who presents to the ED c/o acute right foot wound.  The patient reports that he checks his toes every 4 days and on Thursday of last week his toes were normal.  He reports on Sunday he noticed a wound to his right fourth digit.  He states he went to urgent care on Tuesday and was placed on a cream.  He reports it has not gotten worse and it has not gotten better.  He reports a history of diabetes.  He reports his sugars have been running between 160 and 180.  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  He denies any known injury although he does report he has cats and often gets scratches to his feet that he is not aware of.  He denies any fevers or chills.  He states he used to see wound care but he has not seen them in a while.      Review of prior external notes (non-ED) -and- Review of prior external test results outside of this encounter: Laboratory evaluation 10/19/2021 shows a CMP with an elevated blood glucose of 243    Prescription drug monitoring program review:        On exam:  GENERAL: Awake, alert, no acute distress  SKIN: Warm, dry  HENT: Normocephalic, atraumatic  EYES: no scleral icterus  CV: regular rhythm, regular rate  RESPIRATORY: normal effort, lungs clear  ABDOMEN: soft,  nontender, nondistended  MUSCULOSKELETAL: no deformity.  The top of the right fourth toe as well as the lateral aspect has a shallow based ulcer with well-demarcated margins and no surrounding erythema.  There is no drainage.  He has palpable pedal pulses.  NEURO: alert, moves all extremities, follows commands                                                             Labs  Recent Results (from the past 24 hour(s))   Comprehensive Metabolic Panel    Collection Time: 11/02/23 12:22 PM    Specimen: Blood   Result Value Ref Range    Glucose 201 (H) 65 - 99 mg/dL    BUN 17 6 - 20 mg/dL    Creatinine 1.23 0.76 - 1.27 mg/dL    Sodium 134 (L) 136 - 145 mmol/L    Potassium 4.8 3.5 - 5.2 mmol/L    Chloride 99 98 - 107 mmol/L    CO2 24.8 22.0 - 29.0 mmol/L    Calcium 9.9 8.6 - 10.5 mg/dL    Total Protein 7.9 6.0 - 8.5 g/dL    Albumin 5.0 3.5 - 5.2 g/dL    ALT (SGPT) 40 1 - 41 U/L    AST (SGOT) 26 1 - 40 U/L    Alkaline Phosphatase 68 39 - 117 U/L    Total Bilirubin 0.6 0.0 - 1.2 mg/dL    Globulin 2.9 gm/dL    A/G Ratio 1.7 g/dL    BUN/Creatinine Ratio 13.8 7.0 - 25.0    Anion Gap 10.2 5.0 - 15.0 mmol/L    eGFR 72.9 >60.0 mL/min/1.73   Lactic Acid, Plasma    Collection Time: 11/02/23 12:22 PM    Specimen: Blood   Result Value Ref Range    Lactate 1.3 0.5 - 2.0 mmol/L   Sedimentation Rate    Collection Time: 11/02/23 12:22 PM    Specimen: Blood   Result Value Ref Range    Sed Rate <1 0 - 15 mm/hr   C-reactive Protein    Collection Time: 11/02/23 12:22 PM    Specimen: Blood   Result Value Ref Range    C-Reactive Protein <0.30 0.00 - 0.50 mg/dL   CBC Auto Differential    Collection Time: 11/02/23 12:22 PM    Specimen: Blood   Result Value Ref Range    WBC 9.24 3.40 - 10.80 10*3/mm3    RBC 5.61 4.14 - 5.80 10*6/mm3    Hemoglobin 16.5 13.0 - 17.7 g/dL    Hematocrit 48.2 37.5 - 51.0 %    MCV 85.9 79.0 - 97.0 fL    MCH 29.4 26.6 - 33.0 pg    MCHC 34.2 31.5 - 35.7 g/dL    RDW 12.6 12.3 - 15.4 %    RDW-SD 39.2 37.0 - 54.0 fl    MPV  9.4 6.0 - 12.0 fL    Platelets 276 140 - 450 10*3/mm3    Neutrophil % 63.1 42.7 - 76.0 %    Lymphocyte % 27.1 19.6 - 45.3 %    Monocyte % 6.1 5.0 - 12.0 %    Eosinophil % 2.1 0.3 - 6.2 %    Basophil % 1.0 0.0 - 1.5 %    Immature Grans % 0.6 (H) 0.0 - 0.5 %    Neutrophils, Absolute 5.84 1.70 - 7.00 10*3/mm3    Lymphocytes, Absolute 2.50 0.70 - 3.10 10*3/mm3    Monocytes, Absolute 0.56 0.10 - 0.90 10*3/mm3    Eosinophils, Absolute 0.19 0.00 - 0.40 10*3/mm3    Basophils, Absolute 0.09 0.00 - 0.20 10*3/mm3    Immature Grans, Absolute 0.06 (H) 0.00 - 0.05 10*3/mm3    nRBC 0.0 0.0 - 0.2 /100 WBC       Radiology  XR Foot 3+ View Right    Result Date: 11/2/2023  XR FOOT 3+ VW RIGHT-  INDICATIONS: Infection  TECHNIQUE: 3 VIEWS OF THE RIGHT FOOT  COMPARISON: 1/5/2018  FINDINGS:  Mixed sclerotic and lucent change at the bases of the third, fourth, fifth metatarsals of the change from the prior exam, may represent changes of chronic osteomyelitis and/or neuropathic joint, MRI could be considered for further evaluation. No acute fracture is identified. No dislocation is noted.       As described.    This report was finalized on 11/2/2023 12:33 PM by Dr. Aris Atkinson M.D on Workstation: [id number]       Medical Decision Making:  ED Course as of 11/02/23 1720   Thu Nov 02, 2023   1230 XR Foot 3+ View Right  My independent interpretation of the right foot x-ray is no gross fracture [TR]   1255 Glucose(!): 201 [TR]   1255 Sed Rate: <1 [TR]   1255 C-Reactive Protein: <0.30 [TR]   1255 Glucose(!): 201 [KA]   1256 Creatinine: 1.23 [KA]   1256 WBC: 9.24 [KA]   1256 Sed Rate: <1 [KA]   1256 C-Reactive Protein: <0.30 [KA]   1300 Lactate: 1.3  Independent interpretation of the right foot x-rays is diffuse degenerative changes, no acute fracture, 4th toe specifically  [KA]      ED Course User Index  [KA] Nedra Perkins PA-C  [TR] Tello Childs MD       The patient presents with a foot ulcer in the setting of of a diabetic foot.  He  has been treated with antibiotics.  We will obtain x-ray and laboratory evaluation.  He reports no progression of his symptoms for the last 2 days.  If he shows no signs of infection on laboratory evaluation then I think that he can safely be referred to wound care clinic.  My differential diagnosis includes diabetic foot ulcer, cellulitis, abscess, and others.    Procedures:  Procedures                 Diagnosis  Final diagnoses:   Diabetic ulcer of toe of right foot associated with type 2 diabetes mellitus, limited to breakdown of skin   Immunosuppression       Note Disclaimer: At [facility], we believe that sharing information builds trust and better relationships. You are receiving this note because you recently visited [facility]. It is possible you will see health information before a provider has talked with you about it. This kind of information can be easy to misunderstand. To help you fully understand what it means for your health, we urge you to discuss this note with your provider.       Tello Childs MD  11/02/23 5814

## 2023-11-02 NOTE — ED NOTES
Patient to ER via car from home able to walk for wound on R foot    Patient reports he is diabetic

## 2023-11-02 NOTE — Clinical Note
[facility] EMERGENCY DEPARTMENT  [address] [place] [address]  Phone: [phone]    Ray London was seen and treated in our emergency department on 11/2/2023.  He may return to work on 11/03/2023.         Thank you for choosing [facility].    Benji Harrison RN

## 2023-11-07 ENCOUNTER — OFFICE VISIT (OUTPATIENT)
Dept: WOUND CARE | Facility: HOSPITAL | Age: 48
End: 2023-11-07
Payer: COMMERCIAL

## 2023-11-07 PROCEDURE — 97602 WOUND(S) CARE NON-SELECTIVE: CPT

## 2023-11-07 PROCEDURE — G0463 HOSPITAL OUTPT CLINIC VISIT: HCPCS

## (undated) DEVICE — NDL FLTR 19G 1 1/2 LF

## (undated) DEVICE — PK PROC MAJ 40

## (undated) DEVICE — GOWN,NON-REINFORCED,SIRUS,SET IN SLV,XL: Brand: MEDLINE

## (undated) DEVICE — ST TISSOMAT SPRY

## (undated) DEVICE — KT CANSTR VAC WND W/ISOLYSER SENSATRAC 500CC 10CS

## (undated) DEVICE — KT DRSNG VAC SIMPLACE MD 10PK

## (undated) DEVICE — PREMIUM WET SKIN PREP TRAY: Brand: MEDLINE INDUSTRIES, INC.

## (undated) DEVICE — CATH FOL COUDE TIEMAN 2WY 16F 5CC

## (undated) DEVICE — BNDG GZ SOF-FORM CONFRM 2X75IN LF STRL

## (undated) DEVICE — BLD TONG INDIV/WRP A/ 6IN STRL

## (undated) DEVICE — DRAPE,REIN 53X77,STERILE: Brand: MEDLINE

## (undated) DEVICE — SKIN PREP TRAY W/CHG: Brand: MEDLINE INDUSTRIES, INC.

## (undated) DEVICE — GLV SURG BIOGEL LTX PF 7

## (undated) DEVICE — LEGGINGS, PAIR, CLEAR, STERILE: Brand: MEDLINE

## (undated) DEVICE — 3M™ IOBAN™ 2 ANTIMICROBIAL INCISE DRAPE 6648EZ: Brand: IOBAN™ 2

## (undated) DEVICE — DRSNG TELFA PAD NONADH STR 1S 3X8IN

## (undated) DEVICE — METER,URINE,400ML,DRAIN BAG,L/F,LL,SLIDE: Brand: MEDLINE

## (undated) DEVICE — CAUTERY TIP POLISHER: Brand: DEVON

## (undated) DEVICE — JELLY,LUBE,STERILE,FLIP TOP,TUBE,4-OZ: Brand: MEDLINE

## (undated) DEVICE — PATIENT RETURN ELECTRODE, SINGLE-USE, CONTACT QUALITY MONITORING, ADULT, WITH 9FT CORD, FOR PATIENTS WEIGING OVER 33LBS. (15KG): Brand: MEGADYNE

## (undated) DEVICE — PENCL E/S ULTRAVAC TELESCP NOSE HOLSTR 10FT

## (undated) DEVICE — NDL HYPO PRECISIONGLIDE REG 25G 1 1/2

## (undated) DEVICE — SUT VIC 5/0 TF 27IN J433H

## (undated) DEVICE — SHEET, DRAPE, SPLIT, STERILE: Brand: MEDLINE

## (undated) DEVICE — STPLR SKIN VISISTAT WD 35CT

## (undated) DEVICE — PENCL ES MEGADINE EZ/CLEAN BUTN W/HOLSTR 10FT

## (undated) DEVICE — ST IRR CYSTO W/SPK 77IN LF

## (undated) DEVICE — TRAP FLD MINIVAC MEGADYNE 100ML

## (undated) DEVICE — PK ORTHO MINOR 40

## (undated) DEVICE — DRSNG SURESITE123 4X10IN

## (undated) DEVICE — SUT ETHLN 2/0 PS 18IN 585H

## (undated) DEVICE — TUBING, SUCTION, 1/4" X 20', STRAIGHT: Brand: MEDLINE INDUSTRIES, INC.

## (undated) DEVICE — SPK PIN NSR FLUID NONVNT 2WY MINI LL LF

## (undated) DEVICE — GAUZE,SPONGE,FLUFF,6"X6.75",STRL,10/TRAY: Brand: MEDLINE